# Patient Record
Sex: MALE | Race: WHITE | NOT HISPANIC OR LATINO | Employment: FULL TIME | URBAN - METROPOLITAN AREA
[De-identification: names, ages, dates, MRNs, and addresses within clinical notes are randomized per-mention and may not be internally consistent; named-entity substitution may affect disease eponyms.]

---

## 2017-03-27 ENCOUNTER — OFFICE VISIT (OUTPATIENT)
Dept: URGENT CARE | Facility: URGENT CARE | Age: 57
End: 2017-03-27
Payer: COMMERCIAL

## 2017-03-27 VITALS
WEIGHT: 173.4 LBS | BODY MASS INDEX: 24.55 KG/M2 | DIASTOLIC BLOOD PRESSURE: 80 MMHG | HEART RATE: 75 BPM | OXYGEN SATURATION: 100 % | SYSTOLIC BLOOD PRESSURE: 130 MMHG

## 2017-03-27 DIAGNOSIS — L29.9 ITCHING: Primary | ICD-10-CM

## 2017-03-27 PROCEDURE — 99213 OFFICE O/P EST LOW 20 MIN: CPT | Performed by: FAMILY MEDICINE

## 2017-03-27 RX ORDER — TRIAMCINOLONE ACETONIDE 1 MG/G
CREAM TOPICAL
Qty: 30 G | Refills: 1 | Status: SHIPPED | OUTPATIENT
Start: 2017-03-27 | End: 2017-03-28

## 2017-03-27 NOTE — MR AVS SNAPSHOT
After Visit Summary   3/27/2017    Gael Le    MRN: [id number]           Patient Information     Date Of Birth          1960        Visit Information        Provider Department      3/27/2017 4:30 PM Ryder Roa MD [facility] Urgent [facility]        Today's Diagnoses     Itching    -  1       Follow-ups after your visit        Who to contact     If you have questions or need follow up information about today's clinic visit or your schedule please contact [facility] directly at [phone].  Normal or non-critical lab and imaging results will be communicated to you by Happy Dayshart, letter or phone within 4 business days after the clinic has received the results. If you do not hear from us within 7 days, please contact the clinic through [email or web address] or phone. If you have a critical or abnormal lab result, we will notify you by phone as soon as possible.  Submit refill requests through Anpro21 or call your pharmacy and they will forward the refill request to us. Please allow 3 business days for your refill to be completed.          Additional Information About Your Visit        MyChart Information     Anpro21 gives you secure access to your electronic health record. If you see a primary care provider, you can also send messages to your care team and make appointments. If you have questions, please call your primary care clinic.  If you do not have a primary care provider, please call [phone] and they will assist you.        Care EveryWhere ID     This is your Care EveryWhere ID. This could be used by other organizations to access your [place] medical records  [id number]        Your Vitals Were     Pulse Pulse Oximetry BMI (Body Mass Index)             75 100% 24.55 kg/m2          Blood Pressure from Last 3 Encounters:   03/27/17 130/80   11/22/16 128/74   11/21/16 (!) 148/92    Weight from Last 3 Encounters:   03/27/17 173 lb 6.4 oz (78.7 kg)   11/22/16 175 lb (79.4 kg)    11/14/16 170 lb (77.1 kg)              Today, you had the following     No orders found for display         Today's Medication Changes          These changes are accurate as of: 3/27/17 11:59 PM.  If you have any questions, ask your nurse or doctor.               Start taking these medicines.        Dose/Directions    triamcinolone 0.1 % cream   Commonly known as:  KENALOG   Used for:  Itching   Started by:  Ryder Roa MD        Apply sparingly to affected area three times daily for 14 days.   Quantity:  30 g   Refills:  1            Where to get your medicines      These medications were sent to [facility] [address] [place], [place] [address] [facility] AT [place] [address] [address], [facility] [address]     Phone:  [phone]     triamcinolone 0.1 % cream                Primary Care Provider Office Phone # Fax #    Idania Marroquin [phone]       [address]  [facility] [address]        Thank you!     Thank you for choosing [facility]  for your care. Our goal is always to provide you with excellent care. Hearing back from our patients is one way we can continue to improve our services. Please take a few minutes to complete the written survey that you may receive in the mail after your visit with us. Thank you!             Your Updated Medication List - Protect others around you: Learn how to safely use, store and throw away your medicines at www.disposemymeds.org.          This list is accurate as of: 3/27/17 11:59 PM.  Always use your most recent med list.                   Brand Name Dispense Instructions for use    FISH OIL          fluticasone 50 MCG/ACT spray    FLONASE    3 Bottle    Spray 2 sprays into both nostrils daily       gabapentin 100 MG capsule    NEURONTIN    30 capsule    Take 1 capsule (100 mg) by mouth nightly as needed Restless legs or anxiety       MULTI-VITAMIN DAILY PO      Take  by mouth.        simvastatin 40 MG tablet    ZOCOR    90 tablet    Take 1 tablet  by mouth At Bedtime.       tadalafil 5 MG tablet    CIALIS     Take 5 mg by mouth       triamcinolone 0.1 % cream    KENALOG    30 g    Apply sparingly to affected area three times daily for 14 days.       VIAGRA PO      Take  by mouth.       XANAX 0.5 MG tablet   Generic drug:  ALPRAZolam      Take 0.5 mg by mouth 3 times daily as needed.

## 2017-03-27 NOTE — NURSING NOTE
"Chief Complaint   Patient presents with     Urgent Care     Derm Problem     pt c/o itching in back, possible rash, dry skin x 1 yr OTC: hydrocortisone       Initial /80 (BP Location: Right arm, Patient Position: Chair, Cuff Size: Adult Regular)  Pulse 75  Wt 173 lb 6.4 oz (78.7 kg)  SpO2 100%  BMI 24.55 kg/m2 Estimated body mass index is 24.55 kg/(m^2) as calculated from the following:    Height as of 11/22/16: 5' 10.47\" (1.79 m).    Weight as of this encounter: 173 lb 6.4 oz (78.7 kg).  Medication Reconciliation: complete      Lucy Hurst CMA                                3/27/2017 4:59 PM     "

## 2017-03-28 ENCOUNTER — OFFICE VISIT (OUTPATIENT)
Dept: FAMILY MEDICINE | Facility: CLINIC | Age: 57
End: 2017-03-28
Payer: COMMERCIAL

## 2017-03-28 DIAGNOSIS — D48.5 NEOPLASM OF UNCERTAIN BEHAVIOR OF SKIN: Primary | ICD-10-CM

## 2017-03-28 PROCEDURE — 99213 OFFICE O/P EST LOW 20 MIN: CPT | Mod: 25 | Performed by: FAMILY MEDICINE

## 2017-03-28 PROCEDURE — 88305 TISSUE EXAM BY PATHOLOGIST: CPT | Performed by: FAMILY MEDICINE

## 2017-03-28 PROCEDURE — 11301 SHAVE SKIN LESION 0.6-1.0 CM: CPT | Performed by: FAMILY MEDICINE

## 2017-03-28 NOTE — PROGRESS NOTES
[facility] - PRIMARY CARE SKIN    CC : Lesion(s)  SUBJECTIVE:                                                    Gael Le is a 56 year old male who presents to clinic today because of a mole on the back.    Bothersome lesions noticed by the patient or other skin concerns :  Issue One : A mole on the back is itchy. He has tried applied OTC hydrocortisone, but itchiness persists.  Onset : Not new.  Enlarging : NO.  Bleeding : NO  Itchy or irritating : YES.  Pain or tenderness : NO.  Changing color : NO.  Issue Two : Another mole on the right neck may be new.  Onset : first noticed in the mirror yesterday.  Enlarging : NO.  Bleeding : NO  Itchy or irritating : NO.  Pain or tenderness : NO.  Changing color : NO.    Personal history of skin cancer : NO. Eczema : YES  Family history of skin cancer : YES.    Sun Exposure History  Previous history of significant sun exposure:  Blistering sunburns : YES - once  Tanning beds : YES - when younger.  Sunscreen Use : YES, SPF : 30+.  Sun-protective clothing use : NO  Wide-brimmed hats : YES - when hiking  Sunglasses : YES  Seeks shade : No    Occupation : (indoor).    Refer to electronic medical record (EMR) for past medical history and medications.    INTEGUMENTARY/SKIN: POSITIVE for mole changes  ROS : 14 point review of systems was negative except the symptoms listed above in the HPI.    This document serves as a record of the services and decisions personally performed and made by Alison Boothe MD. It was created on her behalf by Azam Connelly, a trained medical scribe.  The creation of this document is based on the scribe's personal observations and the provider's statements to the medical scribe.  Azam Connelly, March 28, 2017 4:00 PM      OBJECTIVE:                                                    GENERAL: healthy, alert and no distress  SKIN: [place] Skin Type - III.  Neck and Trunk were examined. The dermatoscope was used to help evaluate pigmented  "lesions.  Skin Pertinent Findings:  Back, 5 cm left of midline, at T9 : 9 mm in size, raised, light brown lesion with erythematous base. Dermoscopy - amelanocytic. ? Acntholytic seborrheic keratosis ? Irritated benign nevus ? Other    Right base of neck : 2 mm in size, telangiectasia.    Diagnostic Test Results:  none           ASSESSMENT:                                                      Encounter Diagnosis   Name Primary?     Neoplasm of uncertain behavior of skin Yes         PLAN:                                                    Patient Instructions   FUTURE APPOINTMENTS  Follow up as needed.    WOUND CARE INSTRUCTIONS  1. After 24 hours, change dressing daily.  2. Wash hands before every dressing change.  3. Wash the wound area with a mild soap, then rinse  4. Gently pat dry with a sterile gauze or Q-tip.  5. Apply Vaseline or Aquaphor only over entire wound. Do NOT use Neosporin - as many people react to neomycin.  6. Finally, cover with a bandage or sterile non-stick gauze with micropore paper tape.  7. Repeat once daily until wound has healed.    Do not let the wound dry out.  The wound will heal faster and with better cosmetic results if routinely kept moist with step #5. It is a false belief that a wound heals better when it is exposed to air and allowed to dry out.      Soap, water and shampoo will not hurt this area.    Do not go swimming or take baths, but showering is encouraged.    Limit use of the area where the procedure was done for a few days to allow for optimal healing.    If you experience bleeding:  Repeat steps #2-5 and hold firm pressure on the area for 10 minutes without checking to see if the bleeding has stopped. \"Checking\" pulls off the protective wound clot and restarts the bleeding all over again. Re-apply pressure for 10 minutes if necessary to stop bleeding.  Use additional sterile gauze and tape to maintain pressure once bleeding has stopped.    Signs of Infection:  Infection " can occur in any area where skin has been disrupted.  If you notice persistent redness, swelling, colored drainage, increasing pain, fever or other signs of infection, please call us at: [phone] and ask to have me or my colleague paged. We will call you back to discuss.    Pathology Results:  You will be notified, generally via letter or MyChart, in approximately 10 days. If there is anything we need to discuss or further treatment needed, I will call you to discuss it.      PATIENT INFORMATION : WOUNDS  During the healing process you will notice a number of changes. All wounds develop a small halo of redness surrounding the wound.  This means healing is occurring. Severe itching with extensive redness usually indicates sensitivity to the ointment or bandage tape used to dress the wound.  You should call our office if this develops.      Swelling  and/or discoloration around your surgical site is common, particularly when performed around the eye.    All wounds normally drain.  The larger the wound the more drainage there will be.  After 7-10 days, you will notice the wound beginning to shrink and new skin will begin to grow.  The wound is healed when you can see skin has formed over the entire area.  A healed wound has a healthy, shiny look to the surface and is red to dark pink in color to normalize.  Wounds may take approximately 4-6 weeks to heal.  Larger wounds may take 6-8 weeks. After the wound is healed you may discontinue dressing changes.    You may experience a sensation of tightness as your wound heals. This is normal and will gradually subside.    Your healed wound may be sensitive to temperature changes. This sensitivity improves with time, but if you re having a lot of discomfort, try to avoid temperature extremes.    Patients frequently experience itching after their wound appears to have healed because of the continue healing under the skin.  Plain Vaseline will help relieve the  itching.            PROCEDURES:                                                    Name : Shave Excision  Indication : Excision of tissue for pathology evaluation.  Location(s) : Back, 5 cm left of midline, at T9 : 9 mm in size, raised, light brown lesion with erythematous base. Dermoscopy - amelanocytic. ? Acntholytic seborrheic keratosis ? Irritated benign nevus ? Other.  Completed by : Alison Boothe MD  Photo Taken : no.  Anesthesia : Patient was anesthetized by infiltrating the area surrounding the lesion with 1% lidocaine.   epinephrine [id number] : Yes.  Buffered with bicarbonate : Yes.  Note : Discussed the risk of pain, infection, scarring, hypo- or hyperpigmentation and recurrence or need for re-treatment. The benefits of treatment and alternative treatments were also discussed.    During this procedure, the universal protocol was utilized. The patient's identity was confirmed by no less than two patient identifiers, correct procedure was verified, correct site was verified and marked as applicable and a final pause was completed.    Sterile technique was used throughout the procedure. The skin was cleaned and prepped with surgical cleanser. Once adequate anesthesia was obtained, the lesion was removed with a deep scallop shave procedure. The specimen was sent to pathology.    Direct pressure and monsels's and monopolar cautery was applied for hemostasis. No bleeding was present upon the completion of the procedure. The wound was coated with antibacterial ointment. A dry sterile dressing was applied. Patient tolerated the procedure well and left in satisfactory condition.    Primary provider and referring provider will be informed regarding the tissue report when it returns.      The information in this document, created by the medical scribe for me, accurately reflects the services I personally performed and the decisions made by me. I have reviewed and approved this document for accuracy prior to leaving  the patient care area.  Alison Boothe MD March 28, 2017 4:00 PM  [facility] - PRIMARY CARE SKIN

## 2017-03-28 NOTE — PATIENT INSTRUCTIONS
"FUTURE APPOINTMENTS  Follow up as needed.    WOUND CARE INSTRUCTIONS  1. After 24 hours, change dressing daily.  2. Wash hands before every dressing change.  3. Wash the wound area with a mild soap, then rinse  4. Gently pat dry with a sterile gauze or Q-tip.  5. Apply Vaseline or Aquaphor only over entire wound. Do NOT use Neosporin - as many people react to neomycin.  6. Finally, cover with a bandage or sterile non-stick gauze with micropore paper tape.  7. Repeat once daily until wound has healed.    Do not let the wound dry out.  The wound will heal faster and with better cosmetic results if routinely kept moist with step #5. It is a false belief that a wound heals better when it is exposed to air and allowed to dry out.      Soap, water and shampoo will not hurt this area.    Do not go swimming or take baths, but showering is encouraged.    Limit use of the area where the procedure was done for a few days to allow for optimal healing.    If you experience bleeding:  Repeat steps #2-5 and hold firm pressure on the area for 10 minutes without checking to see if the bleeding has stopped. \"Checking\" pulls off the protective wound clot and restarts the bleeding all over again. Re-apply pressure for 10 minutes if necessary to stop bleeding.  Use additional sterile gauze and tape to maintain pressure once bleeding has stopped.    Signs of Infection:  Infection can occur in any area where skin has been disrupted.  If you notice persistent redness, swelling, colored drainage, increasing pain, fever or other signs of infection, please call us at: [phone] and ask to have me or my colleague paged. We will call you back to discuss.    Pathology Results:  You will be notified, generally via letter or MyChart, in approximately 10 days. If there is anything we need to discuss or further treatment needed, I will call you to discuss it.      PATIENT INFORMATION : WOUNDS  During the healing process you will notice a number " of changes. All wounds develop a small halo of redness surrounding the wound.  This means healing is occurring. Severe itching with extensive redness usually indicates sensitivity to the ointment or bandage tape used to dress the wound.  You should call our office if this develops.      Swelling  and/or discoloration around your surgical site is common, particularly when performed around the eye.    All wounds normally drain.  The larger the wound the more drainage there will be.  After 7-10 days, you will notice the wound beginning to shrink and new skin will begin to grow.  The wound is healed when you can see skin has formed over the entire area.  A healed wound has a healthy, shiny look to the surface and is red to dark pink in color to normalize.  Wounds may take approximately 4-6 weeks to heal.  Larger wounds may take 6-8 weeks. After the wound is healed you may discontinue dressing changes.    You may experience a sensation of tightness as your wound heals. This is normal and will gradually subside.    Your healed wound may be sensitive to temperature changes. This sensitivity improves with time, but if you re having a lot of discomfort, try to avoid temperature extremes.    Patients frequently experience itching after their wound appears to have healed because of the continue healing under the skin.  Plain Vaseline will help relieve the itching.

## 2017-03-28 NOTE — MR AVS SNAPSHOT
"              After Visit Summary   3/28/2017    Gael Le    MRN: [id number]           Patient Information     Date Of Birth          1960        Visit Information        Provider Department      3/28/2017 4:20 PM Magda Boothe MD [facility]        Today's Diagnoses     Neoplasm of uncertain behavior of skin    -  1      Care Instructions    FUTURE APPOINTMENTS  Follow up as needed.    WOUND CARE INSTRUCTIONS  1. After 24 hours, change dressing daily.  2. Wash hands before every dressing change.  3. Wash the wound area with a mild soap, then rinse  4. Gently pat dry with a sterile gauze or Q-tip.  5. Apply Vaseline or Aquaphor only over entire wound. Do NOT use Neosporin - as many people react to neomycin.  6. Finally, cover with a bandage or sterile non-stick gauze with micropore paper tape.  7. Repeat once daily until wound has healed.    Do not let the wound dry out.  The wound will heal faster and with better cosmetic results if routinely kept moist with step #5. It is a false belief that a wound heals better when it is exposed to air and allowed to dry out.      Soap, water and shampoo will not hurt this area.    Do not go swimming or take baths, but showering is encouraged.    Limit use of the area where the procedure was done for a few days to allow for optimal healing.    If you experience bleeding:  Repeat steps #2-5 and hold firm pressure on the area for 10 minutes without checking to see if the bleeding has stopped. \"Checking\" pulls off the protective wound clot and restarts the bleeding all over again. Re-apply pressure for 10 minutes if necessary to stop bleeding.  Use additional sterile gauze and tape to maintain pressure once bleeding has stopped.    Signs of Infection:  Infection can occur in any area where skin has been disrupted.  If you notice persistent redness, swelling, colored drainage, increasing pain, fever or other signs of infection, please " call us at: [phone] and ask to have me or my colleague paged. We will call you back to discuss.    Pathology Results:  You will be notified, generally via letter or MyChart, in approximately 10 days. If there is anything we need to discuss or further treatment needed, I will call you to discuss it.      PATIENT INFORMATION : WOUNDS  During the healing process you will notice a number of changes. All wounds develop a small halo of redness surrounding the wound.  This means healing is occurring. Severe itching with extensive redness usually indicates sensitivity to the ointment or bandage tape used to dress the wound.  You should call our office if this develops.      Swelling  and/or discoloration around your surgical site is common, particularly when performed around the eye.    All wounds normally drain.  The larger the wound the more drainage there will be.  After 7-10 days, you will notice the wound beginning to shrink and new skin will begin to grow.  The wound is healed when you can see skin has formed over the entire area.  A healed wound has a healthy, shiny look to the surface and is red to dark pink in color to normalize.  Wounds may take approximately 4-6 weeks to heal.  Larger wounds may take 6-8 weeks. After the wound is healed you may discontinue dressing changes.    You may experience a sensation of tightness as your wound heals. This is normal and will gradually subside.    Your healed wound may be sensitive to temperature changes. This sensitivity improves with time, but if you re having a lot of discomfort, try to avoid temperature extremes.    Patients frequently experience itching after their wound appears to have healed because of the continue healing under the skin.  Plain Vaseline will help relieve the itching.            Follow-ups after your visit        Your next 10 appointments already scheduled     Mar 28, 2017  4:20 PM CDT   Office Visit with Magda Boothe MD   [place]  [facility] ([facility] )    [address] [place] [address]  [address]  [facility] [address] [phone]           Bring a current list of meds and any records pertaining to this visit.  For Physicals, please bring immunization records and any forms needing to be filled out.  Please arrive 10 minutes early to complete paperwork.            Apr 03, 2017 12:40 PM CDT   MyChart Short with Idania Mar[facility]quin MD   [facility]    [address] [facility] [address] [phone]              Who to contact     If you have questions or need follow up information about today's clinic visit or your schedule please contact [facility] directly at [phone].  Normal or non-critical lab and imaging results will be communicated to you by MyChart, letter or phone within 4 business days after the clinic has received the results. If you do not hear from us within 7 days, please contact the clinic through MyChart or phone. If you have a critical or abnormal lab result, we will notify you by phone as soon as possible.  Submit refill requests through [employer] or call your pharmacy and they will forward the refill request to us. Please allow 3 business days for your refill to be completed.          Additional Information About Your Visit        MyChart Information     [employer] gives you secure access to your electronic health record. If you see a primary care provider, you can also send messages to your care team and make appointments. If you have questions, please call your primary care clinic.  If you do not have a primary care provider, please call [phone] and they will assist you.        Care EveryWhere ID     This is your Care EveryWhere ID. This could be used by other organizations to access your [place] medical records  [id number]         Blood Pressure from Last 3 Encounters:   03/27/17 130/80    11/22/16 128/74   11/21/16 (!) 148/92    Weight from Last 3 Encounters:   03/27/17 173 lb 6.4 oz (78.7 kg)   11/22/16 175 lb (79.4 kg)   11/14/16 170 lb (77.1 kg)              Today, you had the following     No orders found for display       Primary Care Provider Office Phone # Fax #    Idania Marroquin [phone]       [facility] [address] [place] [address] N  [facility] [address]        Thank you!     Thank you for choosing [facility]  for your care. Our goal is always to provide you with excellent care. Hearing back from our patients is one way we can continue to improve our services. Please take a few minutes to complete the written survey that you may receive in the mail after your visit with us. Thank you!             Your Updated Medication List - Protect others around you: Learn how to safely use, store and throw away your medicines at www.disposemymeds.org.          This list is accurate as of: 3/28/17  4:06 PM.  Always use your most recent med list.                   Brand Name Dispense Instructions for use    FISH OIL          fluticasone 50 MCG/ACT spray    FLONASE    3 Bottle    Spray 2 sprays into both nostrils daily       gabapentin 100 MG capsule    NEURONTIN    30 capsule    Take 1 capsule (100 mg) by mouth nightly as needed Restless legs or anxiety       MULTI-VITAMIN DAILY PO      Take  by mouth.       simvastatin 40 MG tablet    ZOCOR    90 tablet    Take 1 tablet  by mouth At Bedtime.       tadalafil 5 MG tablet    CIALIS     Take 5 mg by mouth       VIAGRA PO      Take  by mouth.       XANAX 0.5 MG tablet   Generic drug:  ALPRAZolam      Take 0.5 mg by mouth 3 times daily as needed.

## 2017-03-30 LAB — COPATH REPORT: NORMAL

## 2017-04-22 ENCOUNTER — MYC MEDICAL ADVICE (OUTPATIENT)
Dept: FAMILY MEDICINE | Facility: CLINIC | Age: 57
End: 2017-04-22

## 2017-05-09 DIAGNOSIS — E78.2 MIXED HYPERLIPIDEMIA: ICD-10-CM

## 2017-05-09 LAB
CHOLEST SERPL-MCNC: 284 MG/DL
HDLC SERPL-MCNC: 50 MG/DL
LDLC SERPL CALC-MCNC: 196 MG/DL
NONHDLC SERPL-MCNC: 234 MG/DL
TRIGL SERPL-MCNC: 188 MG/DL

## 2017-05-09 PROCEDURE — 80061 LIPID PANEL: CPT | Performed by: INTERNAL MEDICINE

## 2017-05-09 PROCEDURE — 36415 COLL VENOUS BLD VENIPUNCTURE: CPT | Performed by: INTERNAL MEDICINE

## 2017-05-14 ENCOUNTER — MYC MEDICAL ADVICE (OUTPATIENT)
Dept: FAMILY MEDICINE | Facility: CLINIC | Age: 57
End: 2017-05-14

## 2017-05-14 DIAGNOSIS — E78.2 MIXED HYPERLIPIDEMIA: Primary | ICD-10-CM

## 2017-05-15 RX ORDER — ATORVASTATIN CALCIUM 20 MG/1
20 TABLET, FILM COATED ORAL DAILY
Qty: 90 TABLET | Refills: 1 | Status: SHIPPED | OUTPATIENT
Start: 2017-05-15 | End: 2017-09-07

## 2017-06-06 ENCOUNTER — ALLIED HEALTH/NURSE VISIT (OUTPATIENT)
Dept: NURSING | Facility: CLINIC | Age: 57
End: 2017-06-06
Payer: COMMERCIAL

## 2017-06-06 DIAGNOSIS — Z23 NEED FOR TDAP VACCINATION: Primary | ICD-10-CM

## 2017-06-06 PROCEDURE — 90715 TDAP VACCINE 7 YRS/> IM: CPT

## 2017-06-06 PROCEDURE — 99207 ZZC NO CHARGE NURSE ONLY: CPT

## 2017-06-06 PROCEDURE — 90471 IMMUNIZATION ADMIN: CPT

## 2017-06-06 NOTE — NURSING NOTE
Screening Questionnaire for Adult Immunization    Are you sick today?   No   Do you have allergies to medications, food, a vaccine component or latex?   No   Have you ever had a serious reaction after receiving a vaccination?   No   Do you have a long-term health problem with heart disease, lung disease, asthma, kidney disease, metabolic disease (e.g. diabetes), anemia, or other blood disorder?   No   Do you have cancer, leukemia, HIV/AIDS, or any other immune system problem?   No   In the past 3 months, have you taken medications that affect  your immune system, such as prednisone, other steroids, or anticancer drugs; drugs for the treatment of rheumatoid arthritis, Crohn s disease, or psoriasis; or have you had radiation treatments?   No   Have you had a seizure, or a brain or other nervous system problem?   No   During the past year, have you received a transfusion of blood or blood     products, or been given immune (gamma) globulin or antiviral drug?   No   For women: Are you pregnant or is there a chance you could become        pregnant during the next month?   No   Have you received any vaccinations in the past 4 weeks?   No     Immunization questionnaire answers were all negative.      MNVFC doesn't apply on this patient    Per orders of Dr. Cara Emerson, injection of TDAP  given by Demi Sullivan. Patient instructed to remain in clinic for 20 minutes afterwards, and to report any adverse reaction to me immediately.       Screening performed by Demi Sullivan on 6/6/2017 at 4:55 PM.

## 2017-06-06 NOTE — MR AVS SNAPSHOT
After Visit Summary   6/6/2017    Gael Le    MRN: [id number]           Patient Information     Date Of Birth          1960        Visit Information        Provider Department      6/6/2017 4:40 PM [facility]        Today's Diagnoses     Need for Tdap vaccination    -  1       Follow-ups after your visit        Who to contact     If you have questions or need follow up information about today's clinic visit or your schedule please contact [facility] directly at [phone].  Normal or non-critical lab and imaging results will be communicated to you by [employer], letter or phone within 4 business days after the clinic has received the results. If you do not hear from us within 7 days, please contact the clinic through [employer] or phone. If you have a critical or abnormal lab result, we will notify you by phone as soon as possible.  Submit refill requests through [employer] or call your pharmacy and they will forward the refill request to us. Please allow 3 business days for your refill to be completed.          Additional Information About Your Visit        MyChart Information     [employer] gives you secure access to your electronic health record. If you see a primary care provider, you can also send messages to your care team and make appointments. If you have questions, please call your primary care clinic.  If you do not have a primary care provider, please call [phone] and they will assist you.        Care EveryWhere ID     This is your Care EveryWhere ID. This could be used by other organizations to access your [place] medical records  [id number]         Blood Pressure from Last 3 Encounters:   03/27/17 130/80   11/22/16 128/74   11/21/16 (!) 148/92    Weight from Last 3 Encounters:   03/27/17 173 lb 6.4 oz (78.7 kg)   11/22/16 175 lb (79.4 kg)   11/14/16 170 lb (77.1 kg)              We Performed the Following     TDAP VACCINE (ADACEL)      VACCINE ADMINISTRATION, INITIAL        Primary Care Provider Office Phone # Fax #    Idania Marroquin [phone]       [place] [address] [place] [address] [address]  [facility] [address]        Thank you!     Thank you for choosing [facility]  for your care. Our goal is always to provide you with excellent care. Hearing back from our patients is one way we can continue to improve our services. Please take a few minutes to complete the written survey that you may receive in the mail after your visit with us. Thank you!             Your Updated Medication List - Protect others around you: Learn how to safely use, store and throw away your medicines at www.disposemymeds.org.          This list is accurate as of: 6/6/17  5:10 PM.  Always use your most recent med list.                   Brand Name Dispense Instructions for use    atorvastatin 20 MG tablet    LIPITOR    90 tablet    Take 1 tablet (20 mg) by mouth daily       FISH OIL          fluticasone 50 MCG/ACT spray    FLONASE    3 Bottle    Spray 2 sprays into both nostrils daily       gabapentin 100 MG capsule    NEURONTIN    30 capsule    Take 1 capsule (100 mg) by mouth nightly as needed Restless legs or anxiety       MULTI-VITAMIN DAILY PO      Take  by mouth.       simvastatin 40 MG tablet    ZOCOR    90 tablet    Take 1 tablet  by mouth At Bedtime.       tadalafil 5 MG tablet    CIALIS     Take 5 mg by mouth       VIAGRA PO      Take  by mouth.       XANAX 0.5 MG tablet   Generic drug:  ALPRAZolam      Take 0.5 mg by mouth 3 times daily as needed.

## 2017-07-14 DIAGNOSIS — N52.9 ERECTILE DYSFUNCTION, UNSPECIFIED ERECTILE DYSFUNCTION TYPE: Primary | ICD-10-CM

## 2017-07-14 NOTE — TELEPHONE ENCOUNTER
Sildenafil Citrate (VIAGRA PO)      Last Written Prescription Date:  11/22/16  Last Fill Quantity: n/a,   # refills: n/a  Last Office Visit with [facility], [facility] or [facility] prescribing provider: 3/28/17  Future Office visit:    Next 5 appointments (look out 90 days)     Aug 29, 2017  8:30 AM CDT   Lab visit with [facility]   [facility]    [address] [place] [address]   [phone]            Sep 07, 2017  7:00 AM CDT   MyChart Physical Adult with Idania [facility]quin [facility]   [facility]    [address] [place] [address] [phone]                   Routing refill request to provider for review/approval because:  Medication is reported/historical

## 2017-07-17 RX ORDER — SILDENAFIL CITRATE 20 MG/1
TABLET ORAL
Qty: 60 TABLET | Refills: 0 | Status: SHIPPED | OUTPATIENT
Start: 2017-07-17 | End: 2017-10-24 | Stop reason: ALTCHOICE

## 2017-08-01 DIAGNOSIS — F41.1 ANXIETY STATE: Primary | ICD-10-CM

## 2017-08-03 RX ORDER — ALPRAZOLAM 0.5 MG
TABLET ORAL
Qty: 30 TABLET | Refills: 0 | Status: SHIPPED | OUTPATIENT
Start: 2017-08-03 | End: 2018-05-17

## 2017-08-03 NOTE — TELEPHONE ENCOUNTER
Routing refill request to provider for review/approval because:  Drug not on the [facility] refill protocol   Medication is reported/historical  Leigh Ann Hector RN

## 2017-08-03 NOTE — TELEPHONE ENCOUNTER
Fax script   PSK    Review -[place] website.  Last fill #30 in October 2016.    Fill now for as needed use. PSK

## 2017-09-01 DIAGNOSIS — Z12.5 SCREENING FOR PROSTATE CANCER: ICD-10-CM

## 2017-09-01 DIAGNOSIS — E78.2 MIXED HYPERLIPIDEMIA: ICD-10-CM

## 2017-09-01 LAB
ALT SERPL W P-5'-P-CCNC: 45 U/L (ref 0–70)
CHOLEST SERPL-MCNC: 178 MG/DL
HDLC SERPL-MCNC: 52 MG/DL
LDLC SERPL CALC-MCNC: 89 MG/DL
NONHDLC SERPL-MCNC: 126 MG/DL
PSA SERPL-ACNC: 0.85 UG/L (ref 0–4)
TRIGL SERPL-MCNC: 186 MG/DL

## 2017-09-01 PROCEDURE — G0103 PSA SCREENING: HCPCS | Performed by: INTERNAL MEDICINE

## 2017-09-01 PROCEDURE — 36415 COLL VENOUS BLD VENIPUNCTURE: CPT | Performed by: INTERNAL MEDICINE

## 2017-09-01 PROCEDURE — 80061 LIPID PANEL: CPT | Performed by: INTERNAL MEDICINE

## 2017-09-01 PROCEDURE — 84460 ALANINE AMINO (ALT) (SGPT): CPT | Performed by: INTERNAL MEDICINE

## 2017-09-04 NOTE — PROGRESS NOTES
Your cholesterol is improved from previous and is in the normal range on your medication.  The triglyceride level (fats) is borderline elevated and can generally be improved with diet and exercise.  Your liver testing is normal.   Prostate cancer screening is negative/normal.  Please call or MyChart message me if you have any questions.   PSK

## 2017-09-07 ENCOUNTER — OFFICE VISIT (OUTPATIENT)
Dept: FAMILY MEDICINE | Facility: CLINIC | Age: 57
End: 2017-09-07
Payer: COMMERCIAL

## 2017-09-07 VITALS
SYSTOLIC BLOOD PRESSURE: 118 MMHG | TEMPERATURE: 98.6 F | DIASTOLIC BLOOD PRESSURE: 84 MMHG | BODY MASS INDEX: 24.77 KG/M2 | HEIGHT: 70 IN | WEIGHT: 173 LBS | HEART RATE: 95 BPM | OXYGEN SATURATION: 97 %

## 2017-09-07 DIAGNOSIS — Z23 NEED FOR PROPHYLACTIC VACCINATION AND INOCULATION AGAINST INFLUENZA: ICD-10-CM

## 2017-09-07 DIAGNOSIS — Z00.00 ROUTINE GENERAL MEDICAL EXAMINATION AT A HEALTH CARE FACILITY: Primary | ICD-10-CM

## 2017-09-07 DIAGNOSIS — M79.621 PAIN OF RIGHT UPPER ARM: ICD-10-CM

## 2017-09-07 DIAGNOSIS — F41.1 ANXIETY STATE: ICD-10-CM

## 2017-09-07 DIAGNOSIS — E78.2 MIXED HYPERLIPIDEMIA: ICD-10-CM

## 2017-09-07 PROCEDURE — 90471 IMMUNIZATION ADMIN: CPT | Performed by: FAMILY MEDICINE

## 2017-09-07 PROCEDURE — 90686 IIV4 VACC NO PRSV 0.5 ML IM: CPT | Performed by: FAMILY MEDICINE

## 2017-09-07 PROCEDURE — 99396 PREV VISIT EST AGE 40-64: CPT | Mod: 25 | Performed by: FAMILY MEDICINE

## 2017-09-07 RX ORDER — ATORVASTATIN CALCIUM 20 MG/1
20 TABLET, FILM COATED ORAL DAILY
Qty: 90 TABLET | Refills: 3 | Status: SHIPPED | OUTPATIENT
Start: 2017-09-07 | End: 2018-05-17

## 2017-09-07 ASSESSMENT — PAIN SCALES - GENERAL: PAINLEVEL: NO PAIN (0)

## 2017-09-07 NOTE — PATIENT INSTRUCTIONS
Refill Lipitor today.  Add aspirin daily.      Flu vaccine today.    For the skin rash in winter continue to use the cetaphil and the hydrocortisone.  Follow up if not resolved with this treatment.    For the arm pain - anticipate continued gradual improvement with your planned exercise program.    Preventive Health Recommendations  Male Ages 50   64    Yearly exam:             See your health care provider every year in order to  o   Review health changes.   o   Discuss preventive care.    o   Review your medicines if your doctor has prescribed any.     Have a cholesterol test every 5 years, or more frequently if you are at risk for high cholesterol/heart disease.     Have a diabetes test (fasting glucose) every three years. If you are at risk for diabetes, you should have this test more often.     Have a colonoscopy at age 50, or have a yearly FIT test (stool test). These exams will check for colon cancer.      Talk with your health care provider about whether or not a prostate cancer screening test (PSA) is right for you.    You should be tested each year for STDs (sexually transmitted diseases), if you re at risk.     Shots: Get a flu shot each year. Get a tetanus shot every 10 years.     Nutrition:    Eat at least 5 servings of fruits and vegetables daily.     Eat whole-grain bread, whole-wheat pasta and brown rice instead of white grains and rice.     Talk to your provider about Calcium and Vitamin D.     Lifestyle    Exercise for at least 150 minutes a week (30 minutes a day, 5 days a week). This will help you control your weight and prevent disease.     Limit alcohol to one drink per day.     No smoking.     Wear sunscreen to prevent skin cancer.     See your dentist every six months for an exam and cleaning.     See your eye doctor every 1 to 2 years.

## 2017-09-07 NOTE — MR AVS SNAPSHOT
After Visit Summary   9/7/2017    Gael Le    MRN: [id number]           Patient Information     Date Of Birth          1960        Visit Information        Provider Department      9/7/2017 7:00 AM Idania Marroquin MD [facility]        Today's Diagnoses     Routine general medical examination at a health care facility    -  1    Need for prophylactic vaccination and inoculation against influenza        Mixed hyperlipidemia        Anxiety state          Care Instructions    Refill Lipitor today.  Add aspirin daily.      Flu vaccine today.    For the skin rash in winter continue to use the cetaphil and the hydrocortisone.  Follow up if not resolved with this treatment.    For the arm pain - anticipate continued gradual improvement with your planned exercise program.    Preventive Health Recommendations  Male Ages 50 - 64    Yearly exam:             See your health care provider every year in order to  o   Review health changes.   o   Discuss preventive care.    o   Review your medicines if your doctor has prescribed any.     Have a cholesterol test every 5 years, or more frequently if you are at risk for high cholesterol/heart disease.     Have a diabetes test (fasting glucose) every three years. If you are at risk for diabetes, you should have this test more often.     Have a colonoscopy at age 50, or have a yearly FIT test (stool test). These exams will check for colon cancer.      Talk with your health care provider about whether or not a prostate cancer screening test (PSA) is right for you.    You should be tested each year for STDs (sexually transmitted diseases), if you re at risk.     Shots: Get a flu shot each year. Get a tetanus shot every 10 years.     Nutrition:    Eat at least 5 servings of fruits and vegetables daily.     Eat whole-grain bread, whole-wheat pasta and brown rice instead of white grains and rice.     Talk to your provider about Calcium and Vitamin D.  "    Lifestyle    Exercise for at least 150 minutes a week (30 minutes a day, 5 days a week). This will help you control your weight and prevent disease.     Limit alcohol to one drink per day.     No smoking.     Wear sunscreen to prevent skin cancer.     See your dentist every six months for an exam and cleaning.     See your eye doctor every 1 to 2 years.            Follow-ups after your visit        Who to contact     If you have questions or need follow up information about today's clinic visit or your schedule please contact [facility] directly at [phone].  Normal or non-critical lab and imaging results will be communicated to you by [employer], letter or phone within 4 business days after the clinic has received the results. If you do not hear from us within 7 days, please contact the clinic through [employer] or phone. If you have a critical or abnormal lab result, we will notify you by phone as soon as possible.  Submit refill requests through [employer] or call your pharmacy and they will forward the refill request to us. Please allow 3 business days for your refill to be completed.          Additional Information About Your Visit        [employer] Information     [employer] gives you secure access to your electronic health record. If you see a primary care provider, you can also send messages to your care team and make appointments. If you have questions, please call your primary care clinic.  If you do not have a primary care provider, please call [phone] and they will assist you.        Care EveryWhere ID     This is your Care EveryWhere ID. This could be used by other organizations to access your [place] medical records  [id number]        Your Vitals Were     Pulse Temperature Height Pulse Oximetry BMI (Body Mass Index)       95 98.6  F (37  C) (Oral) 1.765 m (5' 9.5\") 97% 25.18 kg/m2        Blood Pressure from Last 3 Encounters:   09/07/17 118/84   03/27/17 130/80   11/22/16 128/74    " Weight from Last 3 Encounters:   09/07/17 78.5 kg (173 lb)   03/27/17 78.7 kg (173 lb 6.4 oz)   11/22/16 79.4 kg (175 lb)              We Performed the Following     FLU VAC, SPLIT VIRUS IM > 3 YO (QUADRIVALENT) [53229]     Vaccine Administration, Initial [15182]          Today's Medication Changes          These changes are accurate as of: 9/7/17  7:40 AM.  If you have any questions, ask your nurse or doctor.               Stop taking these medicines if you haven't already. Please contact your care team if you have questions.     fluticasone 50 MCG/ACT spray   Commonly known as:  FLONASE   Stopped by:  Idania Marroquin MD           simvastatin 40 MG tablet   Commonly known as:  ZOCOR   Stopped by:  Idania Marroquin MD           tadalafil 5 MG tablet   Commonly known as:  CIALIS   Stopped by:  Idania Marroquin MD                Where to get your medicines      These medications were sent to [facility] [address] [place] [address] N AT [place] [address] [address] [facility] [address]     Phone:  [phone]     atorvastatin 20 MG tablet                Primary Care Provider Office Phone # Fax #    Idania Marroquin [phone] [address] [facility] [address]        Equal Access to Services     [facility]: Hadii aad ku hadasho Soomaali, waaxda luqadaha, qaybta kaalmada adeegyada, saeed mora haybrittney cornejo . So [facility] [phone].    ATENCIÓN: Si habla español, tiene a fritz disposición servicios gratuitos de asistencia lingüística. Llame al [phone].    We comply with applicable federal civil rights laws and [place] laws. We do not discriminate on the basis of race, color, national origin, age, disability sex, sexual orientation or gender identity.            Thank you!     Thank you for choosing [facility]  for your care. Our goal is always to provide you with excellent care. Hearing back from our  patients is one way we can continue to improve our services. Please take a few minutes to complete the written survey that you may receive in the mail after your visit with us. Thank you!             Your Updated Medication List - Protect others around you: Learn how to safely use, store and throw away your medicines at www.disposemymeds.org.          This list is accurate as of: 9/7/17  7:40 AM.  Always use your most recent med list.                   Brand Name Dispense Instructions for use Diagnosis    ALPRAZolam 0.5 MG tablet    XANAX    30 tablet    TAKE 1 TABLET BY MOUTH THREE TIMES DAILY AS NEEDED FOR ANXIETY    Anxiety state       atorvastatin 20 MG tablet    LIPITOR    90 tablet    Take 1 tablet (20 mg) by mouth daily    Mixed hyperlipidemia       FISH OIL           gabapentin 100 MG capsule    NEURONTIN    30 capsule    Take 1 capsule (100 mg) by mouth nightly as needed Restless legs or anxiety    Restless legs syndrome (RLS), Anxiety as acute reaction to exceptional stress       MULTI-VITAMIN DAILY PO      Take  by mouth.        sildenafil 20 MG tablet    REVATIO/VIAGRA    60 tablet    TAKE 1-5 TABLETS BY MOUTH AT LEAST 30 MINS BEFORE SEXUAL ACTIVITY    Erectile dysfunction, unspecified erectile dysfunction type       VIAGRA PO      Take  by mouth.

## 2017-09-07 NOTE — NURSING NOTE
"Chief Complaint   Patient presents with     Physical       Initial /84  Pulse 95  Temp 98.6  F (37  C) (Oral)  Ht 1.765 m (5' 9.5\")  Wt 78.5 kg (173 lb)  SpO2 97%  BMI 25.18 kg/m2 Estimated body mass index is 25.18 kg/(m^2) as calculated from the following:    Height as of this encounter: 1.765 m (5' 9.5\").    Weight as of this encounter: 78.5 kg (173 lb).  Medication Reconciliation: complete   João SPENCER        "

## 2017-09-07 NOTE — PROGRESS NOTES
Injectable Influenza Immunization Documentation    1.  Is the person to be vaccinated sick today?  No    2. Does the person to be vaccinated have an allergy to eggs or to a component of the vaccine?  No    3. Has the person to be vaccinated today ever had a serious reaction to influenza vaccine in the past?  No    4. Has the person to be vaccinated ever had Guillain-[place] syndrome?  No     Form completed by João SPENCER

## 2017-09-07 NOTE — PROGRESS NOTES
SUBJECTIVE:   CC: Gael Le is an 57 year old male who presents for preventative health visit.     Healthy Habits:    Do you get at least three servings of calcium containing foods daily (dairy, green leafy vegetables, etc.)? yes    Amount of exercise or daily activities, outside of work: 2-3 day(s) per week  -  running    Problems taking medications regularly No    Medication side effects: No    Have you had an eye exam in the past two years? yes    Do you see a dentist twice per year? yes    Do you have sleep apnea, excessive snoring or daytime drowsiness?yes        Hyperlipidemia Follow-Up      Rate your low fat/cholesterol diet?: good    Taking statin?  Yes, no muscle aches from statin    Other lipid medications/supplements?:  Fish oil/Omega 3,  without side effects    Anxiety Follow-Up    Status since last visit: No change    Other associated symptoms:None    Complicating factors:   Significant life event: No   Current substance abuse: None  Depression symptoms: No  Uses periodic xanax - last refill on 8/3/17.     GAD7            Migraine Follow-Up    Headaches symptoms:  Improved     Frequency: rare     Duration of headaches: hours    Able to do normal daily activities/work with migraines: Yes    Rescue/Relief medication:ibuprofen (Advil, Motrin)              Effectiveness: moderate relief    Preventative medication: None    Neurologic complications: No new stroke-like symptoms, loss of vision or speech, numbness or weakness    In the past 4 weeks, how often have you gone to Urgent Care or the emergency room because of your headaches?  0      Right arm pain since 6-9 months ago after sanding something.  Upper arm.  Mild tenderness with deep palpation.  No limitation of activity.        Today's PHQ-2 Score: PHQ-2 ( 1999 Pfizer) 9/6/2017 11/22/2016   Q1: Little interest or pleasure in doing things 0 -   Q2: Feeling down, depressed or hopeless 0 -   PHQ-2 Score 0 -   Q1: Little interest or pleasure in doing  "things Not at all Not at all   Q2: Feeling down, depressed or hopeless Not at all Not at all   PHQ-2 Score 0 0         Abuse: Current or Past(Physical, Sexual or Emotional)- No  Do you feel safe in your environment - Yes  Social History   Substance Use Topics     Smoking status: Never Smoker     Smokeless tobacco: Never Used     Alcohol use Yes     The patient does not drink >3 drinks per day nor >7 drinks per week.    Last PSA:   PSA   Date Value Ref Range Status   09/01/2017 0.85 0 - 4 ug/L Final     Comment:     Assay Method:  Chemiluminescence using Siemens Vista analyzer       Reviewed orders with patient. Reviewed health maintenance and updated orders accordingly - Yes  BP Readings from Last 3 Encounters:   09/07/17 118/84   03/27/17 130/80   11/22/16 128/74    Wt Readings from Last 3 Encounters:   09/07/17 78.5 kg (173 lb)   03/27/17 78.7 kg (173 lb 6.4 oz)   11/22/16 79.4 kg (175 lb)                      Reviewed and updated as needed this visit by clinical staffTobacco  Allergies  Meds  Med Hx  Surg Hx  Fam Hx  Soc Hx        Reviewed and updated as needed this visit by Provider  Tobacco  Allergies  Meds  Med Hx  Surg Hx  Fam Hx  Soc Hx       Past Medical History:   Diagnosis Date     Hypertension       Past Surgical History:   Procedure Laterality Date     COLONOSCOPY WITH CO2 INSUFFLATION N/A 11/21/2016    Procedure: COLONOSCOPY WITH CO2 INSUFFLATION;  Surgeon: Adeel Graf MD;  Location: [facility] OR     FINGER SURGERY       HERNIA REPAIR         ROS:  10 point ROS of systems including Constitutional, Eyes, Respiratory, Cardiovascular, Gastroenterology, Genitourinary, Integumentary, Muscularskeletal, Psychiatric were all negative except for pertinent positives noted in my HPI.  Periodic rash in posterior thigh and calf in the winter months - uses HC cream and other lotions.        OBJECTIVE:   /84  Pulse 95  Temp 98.6  F (37  C) (Oral)  Ht 1.765 m (5' 9.5\")  Wt 78.5 kg (173 lb)  " SpO2 97%  BMI 25.18 kg/m2  EXAM:  GENERAL: alert, no distress and over weight  EYES: Eyes grossly normal to inspection, PERRL and conjunctivae and sclerae normal  HENT: ear canals and TM's normal, nose and mouth without ulcers or lesions  NECK: no adenopathy, no asymmetry, masses, or scars and thyroid normal to palpation  RESP: lungs clear to auscultation - no rales, rhonchi or wheezes  CV: regular rate and rhythm, normal S1 S2, no S3 or S4, no murmur, click or rub, no peripheral edema and peripheral pulses strong  ABDOMEN: soft, nontender, no hepatosplenomegaly, no masses and bowel sounds normal  RECTAL: normal sphincter tone, no rectal masses, prostate normal size, smooth, nontender without nodules or masses  MS: RUE exam shows normal strength and muscle mass, no deformities, no erythema, induration, or nodules, no evidence of joint effusion, ROM of all joints is normal, no evidence of joint instability and mild tenderness to palpation over the biceps muscle on right.  Normal function.    SKIN: no suspicious lesions or rashes - back of calf and thigh with normal skin  NEURO: Normal strength and tone, mentation intact and speech normal  PSYCH: mentation appears normal, affect normal/bright    ASSESSMENT/PLAN:   1. Routine general medical examination at a health care facility  Reviewed labs previously completed.    Entered by Iadnia Marroquin MD at 9/4/2017  7:59 AM   Read by Gael Le at 9/4/2017  9:38 AM   Your cholesterol is improved from previous and is in the normal range on your medication.  The triglyceride level (fats) is borderline elevated and can generally be improved with diet and exercise.   Your liver testing is normal.   Prostate cancer screening is negative/normal.   Please call or WeMonitorhart message me if you have any questions.    PSK       2. Need for prophylactic vaccination and inoculation against influenza  - FLU VAC, SPLIT VIRUS IM > 3 YO (QUADRIVALENT) [21271]  - Vaccine Administration,  "Initial [97592]    3. Mixed hyperlipidemia  Controlled.  - atorvastatin (LIPITOR) 20 MG tablet; Take 1 tablet (20 mg) by mouth daily  Dispense: 90 tablet; Refill: 3    4. Anxiety state  Prn xanax.  Monitor use.    5.  Right upper arm pain.  Planning arm strengthening program.  Monitor symptoms.  Follow up if persistent or worsening.        COUNSELING:  Reviewed preventive health counseling, as reflected in patient instructions       Regular exercise       Healthy diet/nutrition       Vision screening       Immunizations    Vaccinated for: Influenza           Aspirin ProphylaxsisOsteoporosis Prevention/Bone Health         Prostate cancer screening                      reports that he has never smoked. He has never used smokeless tobacco.      Estimated body mass index is 25.18 kg/(m^2) as calculated from the following:    Height as of this encounter: 1.765 m (5' 9.5\").    Weight as of this encounter: 78.5 kg (173 lb).   Weight management plan: Discussed healthy diet and exercise guidelines and patient will follow up in 12 months in clinic to re-evaluate.    Counseling Resources:  ATP IV Guidelines  Pooled Cohorts Equation Calculator  FRAX Risk Assessment  ICSI Preventive Guidelines  Dietary Guidelines for Americans, 2010  [employer] MyPlate  ASA Prophylaxis  Lung CA Screening    Idania Marroquin MD  [facility]        Patient Instructions   Refill Lipitor today.  Add aspirin daily.      Flu vaccine today.    For the skin rash in winter continue to use the cetaphil and the hydrocortisone.  Follow up if not resolved with this treatment.    For the arm pain - anticipate continued gradual improvement with your planned exercise program.          Answers for HPI/ROS submitted by the patient on 9/6/2017   Annual Exam:  Getting at least 3 servings of Calcium per day:: Yes  Bi-annual eye exam:: Yes  Dental care twice a year:: Yes  Sleep apnea or symptoms of sleep apnea:: Daytime drowsiness, Excessive " snoring  Diet:: Regular (no restrictions)  Frequency of exercise:: 2-3 days/week  Taking medications regularly:: Yes  Medication side effects:: None  Additional concerns today:: YES  PHQ-2 Score: 0  Duration of exercise:: 30-45 minutes

## 2017-10-24 ENCOUNTER — MYC MEDICAL ADVICE (OUTPATIENT)
Dept: FAMILY MEDICINE | Facility: CLINIC | Age: 57
End: 2017-10-24

## 2017-10-24 DIAGNOSIS — N52.9 ERECTILE DYSFUNCTION, UNSPECIFIED ERECTILE DYSFUNCTION TYPE: ICD-10-CM

## 2017-10-24 RX ORDER — TADALAFIL 5 MG/1
5 TABLET ORAL DAILY
Qty: 30 TABLET | Refills: 3 | Status: SHIPPED | OUTPATIENT
Start: 2017-10-24 | End: 2017-12-04

## 2017-11-30 DIAGNOSIS — E78.2 MIXED HYPERLIPIDEMIA: ICD-10-CM

## 2017-11-30 RX ORDER — SIMVASTATIN 40 MG
TABLET ORAL
Qty: 90 TABLET | Refills: 0 | OUTPATIENT
Start: 2017-11-30

## 2017-11-30 NOTE — TELEPHONE ENCOUNTER
simvastatin (ZOCOR) 40 MG tablet (Discontinued)     Last Written Prescription Date: 11/22/16  Last Fill Quantity: 90, # refills: 3  Last Office Visit with G, P or [facility] prescribing provider: 9/7/17       Lab Results   Component Value Date    CHOL 178 09/01/2017     Lab Results   Component Value Date    HDL 52 09/01/2017     Lab Results   Component Value Date    LDL 89 09/01/2017     Lab Results   Component Value Date    TRIG 186 09/01/2017     No results found for: STACEY

## 2017-12-02 ENCOUNTER — MYC MEDICAL ADVICE (OUTPATIENT)
Dept: FAMILY MEDICINE | Facility: CLINIC | Age: 57
End: 2017-12-02

## 2017-12-02 DIAGNOSIS — N52.9 ERECTILE DYSFUNCTION, UNSPECIFIED ERECTILE DYSFUNCTION TYPE: Primary | ICD-10-CM

## 2017-12-04 RX ORDER — TADALAFIL 5 MG/1
TABLET ORAL
Qty: 30 TABLET | Refills: 3 | COMMUNITY
Start: 2017-12-04 | End: 2018-10-29

## 2018-04-02 ENCOUNTER — MYC MEDICAL ADVICE (OUTPATIENT)
Dept: FAMILY MEDICINE | Facility: CLINIC | Age: 58
End: 2018-04-02

## 2018-04-02 DIAGNOSIS — H91.90 HEARING LOSS, UNSPECIFIED HEARING LOSS TYPE, UNSPECIFIED LATERALITY: Primary | ICD-10-CM

## 2018-04-10 ENCOUNTER — OFFICE VISIT (OUTPATIENT)
Dept: AUDIOLOGY | Facility: CLINIC | Age: 58
End: 2018-04-10
Payer: COMMERCIAL

## 2018-04-10 DIAGNOSIS — H90.3 SENSORINEURAL HEARING LOSS (SNHL) OF BOTH EARS: Primary | ICD-10-CM

## 2018-04-10 PROCEDURE — 92557 COMPREHENSIVE HEARING TEST: CPT | Performed by: AUDIOLOGIST

## 2018-04-10 PROCEDURE — 92550 TYMPANOMETRY & REFLEX THRESH: CPT | Performed by: AUDIOLOGIST

## 2018-04-10 NOTE — PROGRESS NOTES
AUDIOLOGY REPORT    SUBJECTIVE:  Gael Le is a 57 year old male who was seen in the Audiology Clinic at the [facility]  for audiologic evaluation, referred by Idania Marroquin MD. The patient reports a longstanding hearing loss for which he is aided binaurally. THe patient also reports longstanding tinnitus bilaterally. The patient denies otalgia, aural fullness, otorrhea, and dizziness.  The patient notes difficulty with communication in a variety of listening situations.      OBJECTIVE:    Otoscopic exam indicates ears are clear of cerumen bilaterally     Pure Tone Thresholds assessed using conventional audiometry with good  reliability from 250-8000 Hz bilaterally using insert earphones     RIGHT:  mild-moderate sensorineural hearing loss    LEFT:    mild-moderate sensorineural hearing loss    Tympanogram:    RIGHT: normal eardrum mobility    LEFT:   normal eardrum mobility    Reflexes (reported by stimulus ear):  RIGHT: Ipsilateral is present at normal levels  RIGHT: Contralateral is present at normal levels  LEFT:   Ipsilateral is present at normal levels  LEFT:   Contralateral is present at normal levels      Speech Reception Threshold:    RIGHT: 35 dB HL    LEFT:   30 dB HL  Word Recognition Score:     RIGHT: 100% at 75 dB HL using NU-6 recorded word list.    LEFT:   100% at 75 dB HL using NU-6 recorded word list.      ASSESSMENT:     ICD-10-CM    1. Sensorineural hearing loss (SNHL) of both ears H90.3 COMPREHENSIVE HEARING TEST     TYMPANOMETRY AND REFLEX THRESHOLD MEASUREMENTS       Compared to patient's previous audiogram, at an outside facility, dated 4/25/13, hearing has declined significantly bilaterally. Today s results were discussed with the patient in detail.     PLAN:  Patient was counseled regarding hearing loss and impact on communication. It is recommended that the patient be evaluated by ENT prior to consider options for newer amplification.  Please call this clinic  with questions regarding these results or recommendations.        Cholo Ly.  Doctor of Audiology  [place] License # [id number]

## 2018-04-10 NOTE — MR AVS SNAPSHOT
After Visit Summary   4/10/2018    Gael Le    MRN: [id number]           Patient Information     Date Of Birth          1960        Visit Information        Provider Department      4/10/2018 4:00 PM Lavelle Monsivais [facility]        Today's Diagnoses     Sensorineural hearing loss (SNHL) of both ears    -  1       Follow-ups after your visit        Who to contact     If you have questions or need follow up information about today's clinic visit or your schedule please contact [facility] directly at [phone].  Normal or non-critical lab and imaging results will be communicated to you by HipLogiqhart, letter or phone within 4 business days after the clinic has received the results. If you do not hear from us within 7 days, please contact the clinic through HipLog[employer] or phone. If you have a critical or abnormal lab result, we will notify you by phone as soon as possible.  Submit refill requests through [employer] or call your pharmacy and they will forward the refill request to us. Please allow 3 business days for your refill to be completed.          Additional Information About Your Visit        MyChart Information     [employer] gives you secure access to your electronic health record. If you see a primary care provider, you can also send messages to your care team and make appointments. If you have questions, please call your primary care clinic.  If you do not have a primary care provider, please call [phone] and they will assist you.      [employer] is an electronic gateway that provides easy, online access to your medical records. With [employer], you can request a clinic appointment, read your test results, renew a prescription or communicate with your care team.     To access your existing account, please contact your [facility] or call [phone] for assistance.        Care EveryWhere ID     This is your Care EveryWhere  ID. This could be used by other organizations to access your [facility] medical records  [id number]         Blood Pressure from Last 3 Encounters:   09/07/17 118/84   03/27/17 130/80   11/22/16 128/74    Weight from Last 3 Encounters:   09/07/17 173 lb (78.5 kg)   03/27/17 173 lb 6.4 oz (78.7 kg)   11/22/16 175 lb (79.4 kg)              We Performed the Following     AUDIOGRAM/TYMPANOGRAM - INTERFACE     COMPREHENSIVE HEARING TEST     TYMPANOMETRY AND REFLEX THRESHOLD MEASUREMENTS        Primary Care Provider Office Phone # Fax #    Idania Marroquin [phone] [address] [facility] [address]        Equal Access to Services     APRIL SILVA : Hadii aad ku hadasho Soomaali, waaxda luqadaha, qaybta kaalmada adeegyada, waxay idiin haybrittney cornejo . So [facility] [phone].    ATENCIÓN: Si habla español, tiene a fritz disposición servicios gratuitos de asistencia lingüística. [facility] [phone].    We comply with applicable federal civil rights laws and [place] laws. We do not discriminate on the basis of race, color, national origin, age, disability, sex, sexual orientation, or gender identity.            Thank you!     Thank you for choosing [facility]  for your care. Our goal is always to provide you with excellent care. Hearing back from our patients is one way we can continue to improve our services. Please take a few minutes to complete the written survey that you may receive in the mail after your visit with us. Thank you!             Your Updated Medication List - Protect others around you: Learn how to safely use, store and throw away your medicines at www.disposemymeds.org.          This list is accurate as of 4/10/18  4:55 PM.  Always use your most recent med list.                   Brand Name Dispense Instructions for use Diagnosis    ALPRAZolam 0.5 MG tablet    XANAX    30 tablet    TAKE 1 TABLET BY MOUTH THREE TIMES DAILY AS NEEDED FOR ANXIETY     Anxiety state       atorvastatin 20 MG tablet    LIPITOR    90 tablet    Take 1 tablet (20 mg) by mouth daily    Mixed hyperlipidemia       FISH OIL           gabapentin 100 MG capsule    NEURONTIN    30 capsule    Take 1 capsule (100 mg) by mouth nightly as needed Restless legs or anxiety    Restless legs syndrome (RLS), Anxiety as acute reaction to exceptional stress       MULTI-VITAMIN DAILY PO      Take  by mouth.        tadalafil 5 MG tablet    CIALIS    30 tablet    Hand written script for 7 mg capsules to different pharmacy for patient.  Do not use with nitroglycerin, terazosin or doxazosin.    Erectile dysfunction, unspecified erectile dysfunction type

## 2018-04-27 ENCOUNTER — DOCUMENTATION ONLY (OUTPATIENT)
Dept: OTHER | Facility: CLINIC | Age: 58
End: 2018-04-27

## 2018-04-27 PROBLEM — Z71.89 ADVANCED DIRECTIVES, COUNSELING/DISCUSSION: Chronic | Status: ACTIVE | Noted: 2018-04-27

## 2018-05-01 ENCOUNTER — DOCUMENTATION ONLY (OUTPATIENT)
Dept: LAB | Facility: CLINIC | Age: 58
End: 2018-05-01

## 2018-05-01 DIAGNOSIS — E04.2 NONTOXIC MULTINODULAR GOITER: ICD-10-CM

## 2018-05-01 DIAGNOSIS — E78.2 MIXED HYPERLIPIDEMIA: Primary | ICD-10-CM

## 2018-05-01 NOTE — PROGRESS NOTES
Please place or confirm lab orders for upcoming lab appointment on 5/9/2018, Physician appointment on 5/17/2018. CAMRON Mccullough CMA.

## 2018-05-02 ENCOUNTER — NURSE TRIAGE (OUTPATIENT)
Dept: NURSING | Facility: CLINIC | Age: 58
End: 2018-05-02

## 2018-05-02 ENCOUNTER — OFFICE VISIT (OUTPATIENT)
Dept: FAMILY MEDICINE | Facility: CLINIC | Age: 58
End: 2018-05-02
Payer: COMMERCIAL

## 2018-05-02 VITALS
HEART RATE: 83 BPM | HEIGHT: 70 IN | BODY MASS INDEX: 25.38 KG/M2 | OXYGEN SATURATION: 96 % | DIASTOLIC BLOOD PRESSURE: 80 MMHG | WEIGHT: 177.3 LBS | TEMPERATURE: 98.7 F | RESPIRATION RATE: 18 BRPM | SYSTOLIC BLOOD PRESSURE: 128 MMHG

## 2018-05-02 DIAGNOSIS — R07.9 ACUTE CHEST PAIN: Primary | ICD-10-CM

## 2018-05-02 DIAGNOSIS — E78.2 MIXED HYPERLIPIDEMIA: ICD-10-CM

## 2018-05-02 DIAGNOSIS — R53.83 OTHER FATIGUE: ICD-10-CM

## 2018-05-02 DIAGNOSIS — E04.2 NONTOXIC MULTINODULAR GOITER: ICD-10-CM

## 2018-05-02 DIAGNOSIS — R73.09 ELEVATED GLUCOSE: ICD-10-CM

## 2018-05-02 LAB
ALBUMIN SERPL-MCNC: 3.9 G/DL (ref 3.4–5)
ALP SERPL-CCNC: 74 U/L (ref 40–150)
ALT SERPL W P-5'-P-CCNC: 40 U/L (ref 0–70)
ANION GAP SERPL CALCULATED.3IONS-SCNC: 4 MMOL/L (ref 3–14)
AST SERPL W P-5'-P-CCNC: 28 U/L (ref 0–45)
BILIRUB SERPL-MCNC: 1 MG/DL (ref 0.2–1.3)
BUN SERPL-MCNC: 20 MG/DL (ref 7–30)
CALCIUM SERPL-MCNC: 9.8 MG/DL (ref 8.5–10.1)
CHLORIDE SERPL-SCNC: 107 MMOL/L (ref 94–109)
CO2 SERPL-SCNC: 29 MMOL/L (ref 20–32)
CREAT SERPL-MCNC: 0.89 MG/DL (ref 0.66–1.25)
CRP SERPL-MCNC: <2.9 MG/L (ref 0–8)
ERYTHROCYTE [DISTWIDTH] IN BLOOD BY AUTOMATED COUNT: 13.3 % (ref 10–15)
ERYTHROCYTE [SEDIMENTATION RATE] IN BLOOD BY WESTERGREN METHOD: 5 MM/H (ref 0–20)
GFR SERPL CREATININE-BSD FRML MDRD: 87 ML/MIN/1.7M2
GLUCOSE SERPL-MCNC: 100 MG/DL (ref 70–99)
HBA1C MFR BLD: 5.2 % (ref 0–5.6)
HCT VFR BLD AUTO: 48.1 % (ref 40–53)
HGB BLD-MCNC: 17 G/DL (ref 13.3–17.7)
MCH RBC QN AUTO: 32.2 PG (ref 26.5–33)
MCHC RBC AUTO-ENTMCNC: 35.3 G/DL (ref 31.5–36.5)
MCV RBC AUTO: 91 FL (ref 78–100)
PLATELET # BLD AUTO: 185 10E9/L (ref 150–450)
POTASSIUM SERPL-SCNC: 4.4 MMOL/L (ref 3.4–5.3)
PROT SERPL-MCNC: 7.7 G/DL (ref 6.8–8.8)
RBC # BLD AUTO: 5.28 10E12/L (ref 4.4–5.9)
SODIUM SERPL-SCNC: 140 MMOL/L (ref 133–144)
TROPONIN I SERPL-MCNC: <0.015 UG/L (ref 0–0.04)
TSH SERPL DL<=0.005 MIU/L-ACNC: 2.5 MU/L (ref 0.4–4)
WBC # BLD AUTO: 7.1 10E9/L (ref 4–11)

## 2018-05-02 PROCEDURE — 85652 RBC SED RATE AUTOMATED: CPT | Performed by: FAMILY MEDICINE

## 2018-05-02 PROCEDURE — 80053 COMPREHEN METABOLIC PANEL: CPT | Performed by: FAMILY MEDICINE

## 2018-05-02 PROCEDURE — 83036 HEMOGLOBIN GLYCOSYLATED A1C: CPT | Performed by: NURSE PRACTITIONER

## 2018-05-02 PROCEDURE — 85027 COMPLETE CBC AUTOMATED: CPT | Performed by: FAMILY MEDICINE

## 2018-05-02 PROCEDURE — 93000 ELECTROCARDIOGRAM COMPLETE: CPT | Performed by: NURSE PRACTITIONER

## 2018-05-02 PROCEDURE — 84443 ASSAY THYROID STIM HORMONE: CPT | Performed by: FAMILY MEDICINE

## 2018-05-02 PROCEDURE — 99214 OFFICE O/P EST MOD 30 MIN: CPT | Performed by: NURSE PRACTITIONER

## 2018-05-02 PROCEDURE — 86140 C-REACTIVE PROTEIN: CPT | Performed by: FAMILY MEDICINE

## 2018-05-02 PROCEDURE — 36415 COLL VENOUS BLD VENIPUNCTURE: CPT | Performed by: FAMILY MEDICINE

## 2018-05-02 PROCEDURE — 84484 ASSAY OF TROPONIN QUANT: CPT | Performed by: FAMILY MEDICINE

## 2018-05-02 ASSESSMENT — PAIN SCALES - GENERAL: PAINLEVEL: NO PAIN (0)

## 2018-05-02 NOTE — TELEPHONE ENCOUNTER
"  Reason for Disposition    [1] Chest pain lasting <= 5 minutes AND [2] NO chest pain or cardiac symptoms now(Exceptions: pains lasting a few seconds)    Additional Information    Negative: Severe difficulty breathing (e.g., struggling for each breath, speaks in single words)    Negative: Difficult to awaken or acting confused (e.g., disoriented, slurred speech)    Negative: Shock suspected (e.g., cold/pale/clammy skin, too weak to stand, low BP, rapid pulse)    Negative: [1] Chest pain lasts > 5 minutes AND [2] history of heart disease  (i.e., heart attack, bypass surgery, angina, angioplasty, CHF; not just a heart murmur)    Negative: [1] Chest pain lasts > 5 minutes AND [2] described as crushing, pressure-like, or heavy    Negative: [1] Chest pain lasts > 5 minutes AND [2] age > 50    Negative: [1] Chest pain lasts > 5 minutes AND [2] age > 30 AND [3] at least one cardiac risk factor (i.e., hypertension, diabetes, obesity, smoker or strong family history of heart disease)    Negative: [1] Chest pain lasts > 5 minutes AND [2] not relieved with nitroglycerin    Negative: Heart beating < 50 beats per minute OR > 140 beats per minute    Negative: Visible sweat on face or sweat dripping down face    Negative: Sounds like a life-threatening emergency to the triager    Negative: Followed a chest injury    Negative: SEVERE chest pain    Negative: [1] Intermittent  chest pain or \"angina\" AND [2] increasing in severity or frequency  (Exception: pains lasting a few seconds)    Negative: Pain also present in shoulder(s) or arm(s) or jaw  (Exception: pain is clearly made worse by movement)    Negative: Difficulty breathing    Negative: Dizziness or lightheadedness    Negative: Coughing up blood    Negative: Cocaine use within last 3 days    Negative: History of prior \"blood clot\" in leg or lungs (i.e., deep vein thrombosis, pulmonary embolism)    Negative: Recent illness requiring prolonged bedrest (i.e., immobilization)    " Negative: Hip or leg fracture in past 2 months (e.g., had cast on leg or ankle)    Negative: Major surgery in the past month    Negative: Recent long-distance travel with prolonged time in car, bus, plane, or train (i.e., within past 2 weeks; 6 or  more hours duration)    Negative: Chest pain lasts > 5 minutes (Exceptions: chest pain occurring > 3 days ago and now asymptomatic; same as previously diagnosed heartburn and has accompanying sour taste in mouth)    Negative: Taking a deep breath makes pain worse    Negative: Patient sounds very sick or weak to the triager    Negative: [1] Chest pain lasts > 5 minutes AND [2] occurred > 3 days ago (72 hours) AND [3] NO chest pain or cardiac symptoms now    Negative: Passed out (i.e., lost consciousness, collapsed and was not responding)    Protocols used: CHEST PAIN-ADULT-AH

## 2018-05-02 NOTE — TELEPHONE ENCOUNTER
"Patient calling reporting he woke x 1 during the night with pain on right side of chest that lasted 2-3 minutes. Pain resolved after rolling onto left side. Patient denied any other symptoms at the time. Patient stating he was sitting on couch this morning and experienced a 2nd episode of sharp right sided chest pain lasting \"seconds.\" Patient stating he is asymptomatic now.   Per guidelines advised to be seen with in 24 hours. Patient was advised for any chest pain lasting longer then 5 minutes to call 911, if symptoms return to be seen in Emergency Room. Advised to call back with any change or increase in symptoms.  Caller verbalized understanding. Denies further questions.    Lianne Muniz RN  [facility]      "

## 2018-05-02 NOTE — PROGRESS NOTES
SUBJECTIVE:   Gael Le is a 57 year old male who presents to clinic today for the following health issues:      CHEST PAIN     Onset: last night, middle of the night     Description:   Location:  right side arm and center of chest  Character: sharp  Radiation: on right side of arm  Duration: a couple  minutes     Intensity: mild/moderate    Progression of Symptoms:  improving    Accompanying Signs & Symptoms:  Shortness of breath: no  Sweating: no  Nausea/vomiting: no  Lightheadedness: no  Palpitations: no  Fever/Chills: no  Cough: no  Heartburn: no    History:   Family history of heart disease YES- patermal grandmother, father had a stroke  Tobacco use: no    Precipitating factors:   Worse with exertion: no  Worse with deep breaths :  no  Related to food: no    Alleviating factors:  Flipped over in bed and it seemed to go away       Therapies Tried and outcome: nothing    Chest pain first occurred last night. Mid to right sided chest pain that radiated down his right arm. Denies sweating or other symptoms during it. Moving/rolling to his left side made it improve. Was sitting on couch this morning occurred again, right chest, down right arm, throbbing feeling.  Last night had jalapeno and stuffed janelle peppers. He used a glove to chop them. Doesn't normally eat them. Has GERD-will treat with rolaids.  Sometimes feels he has trouble breathing, feels more related to congestion. Breath through his mouth no SOB. Breath through his nose notice more SOB. He does run every other day about 30 min. The last month has increased frequency from 2-3x/week to every other day. Lifting grandson recently-9 months solid kid maybe 30 lbs, lifted bikes off a rack over this weekend.     Has PACs, he notices it. Occurs about once a year.     HTN: BP at home around 140/90s    Takes gabapentin-taken maybe twice ever.   Alprazolam maybe once a month  Takes cialis daily, works well    Is a  at [employer]. Sits at a  desk most of the day.     Problem list and histories reviewed & adjusted, as indicated.  Additional history: as documented    Patient Active Problem List   Diagnosis     Rosacea     Anxiety state     Hyperlipidemia     Migraine     Supraventricular premature beats     Erectile dysfunction, unspecified erectile dysfunction type     Nontoxic multinodular goiter     Tinnitus     Advance Care Planning     Past Surgical History:   Procedure Laterality Date     COLONOSCOPY WITH CO2 INSUFFLATION N/A 11/21/2016    Procedure: COLONOSCOPY WITH CO2 INSUFFLATION;  Surgeon: Adeel Graf MD;  Location: [facility] OR     FINGER SURGERY       HERNIA REPAIR         Social History   Substance Use Topics     Smoking status: Never Smoker     Smokeless tobacco: Never Used     Alcohol use Yes     Family History   Problem Relation Age of Onset     Dementia Mother      multiinfarct dementia     DIABETES Father      CEREBROVASCULAR DISEASE Father      Skin Cancer Father      BCC     Myocardial Infarction Paternal Grandmother      Colon Cancer No family hx of      Prostate Cancer No family hx of      Asthma No family hx of          Current Outpatient Prescriptions   Medication Sig Dispense Refill     ALPRAZolam (XANAX) 0.5 MG tablet TAKE 1 TABLET BY MOUTH THREE TIMES DAILY AS NEEDED FOR ANXIETY 30 tablet 0     atorvastatin (LIPITOR) 20 MG tablet Take 1 tablet (20 mg) by mouth daily 90 tablet 3     FISH OIL        gabapentin (NEURONTIN) 100 MG capsule Take 1 capsule (100 mg) by mouth nightly as needed Restless legs or anxiety 30 capsule 0     Multiple Vitamin (MULTI-VITAMIN DAILY PO) Take  by mouth.       tadalafil (CIALIS) 5 MG tablet Hand written script for 7 mg capsules to different pharmacy for patient.  Do not use with nitroglycerin, terazosin or doxazosin. 30 tablet 3     Allergies   Allergen Reactions     Amoxicillin Hives     Lisinopril-Hydrochlorothiazide Cough     Penicillins Hives       Reviewed and updated as needed this visit  "by clinical staff  Tobacco  Allergies  Meds  Problems  Med Hx  Surg Hx  Fam Hx  Soc Hx        Reviewed and updated as needed this visit by Provider  Allergies  Meds  Problems  Surg Hx  Fam Hx         ROS:  Constitutional, HEENT, cardiovascular, pulmonary, gi and gu systems are negative, except as otherwise noted.    OBJECTIVE:     /80 (BP Location: Right arm, Patient Position: Sitting, Cuff Size: Adult Regular)  Pulse 83  Temp 98.7  F (37.1  C) (Oral)  Resp 18  Ht 1.765 m (5' 9.5\")  Wt 80.4 kg (177 lb 4.8 oz)  SpO2 96%  BMI 25.81 kg/m2  Body mass index is 25.81 kg/(m^2).  GENERAL: healthy, alert and no distress  EYES: Eyes grossly normal to inspection, PERRL and conjunctivae and sclerae normal  HENT: ear canals and TM's normal, nose and mouth without ulcers or lesions  NECK: no adenopathy, no asymmetry, masses, or scars and thyroid normal to palpation  RESP: lungs clear to auscultation - no rales, rhonchi or wheezes  CV: regular rate and rhythm, normal S1 S2, no S3 or S4, no murmur, click or rub, no peripheral edema. No chest pain with/without palpation  ABDOMEN: soft, nontender, no hepatosplenomegaly, no masses and bowel sounds normal  MS: no gross musculoskeletal defects noted, no edema    Diagnostic Test Results:  Results for orders placed or performed in visit on 05/02/18 (from the past 24 hour(s))   EKG 12-lead complete w/read - Clinics    Impression    NSR. No changes from previous EKG through [facility].  KATH Herzog, NP-C     **Comprehensive metabolic panel FUTURE anytime   Result Value Ref Range    Sodium 140 133 - 144 mmol/L    Potassium 4.4 3.4 - 5.3 mmol/L    Chloride 107 94 - 109 mmol/L    Carbon Dioxide 29 20 - 32 mmol/L    Anion Gap 4 3 - 14 mmol/L    Glucose 100 (H) 70 - 99 mg/dL    Urea Nitrogen 20 7 - 30 mg/dL    Creatinine 0.89 0.66 - 1.25 mg/dL    GFR Estimate 87 >60 mL/min/1.7m2    GFR Estimate If Black >90 >60 mL/min/1.7m2    Calcium 9.8 8.5 - 10.1 mg/dL    " Bilirubin Total 1.0 0.2 - 1.3 mg/dL    Albumin 3.9 3.4 - 5.0 g/dL    Protein Total 7.7 6.8 - 8.8 g/dL    Alkaline Phosphatase 74 40 - 150 U/L    ALT 40 0 - 70 U/L    AST 28 0 - 45 U/L   **TSH with free T4 reflex FUTURE anytime   Result Value Ref Range    TSH 2.50 0.40 - 4.00 mU/L   Troponin I   Result Value Ref Range    Troponin I ES <0.015 0.000 - 0.045 ug/L   CBC with platelets   Result Value Ref Range    WBC 7.1 4.0 - 11.0 10e9/L    RBC Count 5.28 4.4 - 5.9 10e12/L    Hemoglobin 17.0 13.3 - 17.7 g/dL    Hematocrit 48.1 40.0 - 53.0 %    MCV 91 78 - 100 fl    MCH 32.2 26.5 - 33.0 pg    MCHC 35.3 31.5 - 36.5 g/dL    RDW 13.3 10.0 - 15.0 %    Platelet Count 185 150 - 450 10e9/L   CRP, inflammation   Result Value Ref Range    CRP Inflammation <2.9 0.0 - 8.0 mg/L   Erythrocyte sedimentation rate auto   Result Value Ref Range    Sed Rate 5 0 - 20 mm/h   Hemoglobin A1c   Result Value Ref Range    Hemoglobin A1C 5.2 0 - 5.6 %       ASSESSMENT/PLAN:       1. Acute chest pain  Right sided chest pain.  Exam benign. Has FH of heart disease. First time this occurred. EKG normal, pain improved with movement. Had hot peppers for dinner for the first time in a very long time the night before-possible this was a unusual presentation of GERD. Has GERD at times but states this pain felt different than that. Check labs-mostly negative. Go to ER if occurs again/lasts longer than 5 min.   - EKG 12-lead complete w/read - Clinics  - **Comprehensive metabolic panel FUTURE anytime  - Troponin I  - CBC with platelets  - CRP, inflammation  - Erythrocyte sedimentation rate auto    2. Mixed hyperlipidemia  Check labs. Will need to return for fasting cholesterol check also  - Comprehensive metabolic panel FUTURE anytime    3. Nontoxic multinodular goiter  Check labs, see if related to chest pain  - TSH with free T4 reflex FUTURE anytime    4. Other fatigue  Check labs. CBC normal  - CBC with platelets    FUTURE APPOINTMENTS:       - Follow-up  visit pending labs  -also has physical with Dr. Marroquin in 1-2 weeks, will send to MD also for further suggestions.     KATH Herzog, NP-C  [facility]

## 2018-05-02 NOTE — MR AVS SNAPSHOT
After Visit Summary   5/2/2018    Gael Le    MRN: [id number]           Patient Information     Date Of Birth          1960        Visit Information        Provider Department      5/2/2018 7:20 AM Brielle Zambrano NP [facility]        Today's Diagnoses     Acute chest pain    -  1    Mixed hyperlipidemia        Nontoxic multinodular goiter        Other fatigue        Elevated glucose           Follow-ups after your visit        Your next 10 appointments already scheduled     May 09, 2018  8:20 AM CDT   Lab visit with [facility] ONLY   [facility] ([facility]    [address] [address] [phone]           Please do not eat 10-12 hours before your appointment if you are coming in fasting for labs on lipids, cholesterol, or glucose (sugar). Does not apply to pregnant women.  Water with medications is okay. Do not drink coffee or other fluids.  If you have concerns about taking your medications, please send a message by clicking on Secure Messaging, Message Your Care Team.            May 17, 2018  7:00 AM CDT   PHYSICAL with Idania Marroquin MD   [facility] ([facility]    [address] [address] [phone]              Future tests that were ordered for you today     Open Future Orders        Priority Expected Expires Ordered    Thyroid peroxidase antibody Routine  5/1/2019 5/1/2018    Lipid panel reflex to direct LDL Fasting Routine 5/1/2018 5/1/2019 5/1/2018            Who to contact     If you have questions or need follow up information about today's clinic visit or your schedule please contact [facility] directly at [phone].  Normal or non-critical lab and imaging results will be communicated to you by MyChart, letter or phone within 4 business days after the clinic has received the results. If you do not hear from us within 7 days,  "please contact the clinic through [employer] or phone. If you have a critical or abnormal lab result, we will notify you by phone as soon as possible.  Submit refill requests through [employer] or call your pharmacy and they will forward the refill request to us. Please allow 3 business days for your refill to be completed.          Additional Information About Your Visit        [employer] Information     [employer] gives you secure access to your electronic health record. If you see a primary care provider, you can also send messages to your care team and make appointments. If you have questions, please call your primary care clinic.  If you do not have a primary care provider, please call [phone] and they will assist you.        Care EveryWhere ID     This is your Care EveryWhere ID. This could be used by other organizations to access your [place] medical records  [id number]        Your Vitals Were     Pulse Temperature Respirations Height Pulse Oximetry BMI (Body Mass Index)    83 98.7  F (37.1  C) (Oral) 18 1.765 m (5' 9.5\") 96% 25.81 kg/m2       Blood Pressure from Last 3 Encounters:   05/02/18 128/80   09/07/17 118/84   03/27/17 130/80    Weight from Last 3 Encounters:   05/02/18 80.4 kg (177 lb 4.8 oz)   09/07/17 78.5 kg (173 lb)   03/27/17 78.7 kg (173 lb 6.4 oz)              We Performed the Following     **Comprehensive metabolic panel FUTURE anytime     **TSH with free T4 reflex FUTURE anytime     CBC with platelets     CRP, inflammation     EKG 12-lead complete w/read - Clinics     Erythrocyte sedimentation rate auto     Hemoglobin A1c     Troponin I        Primary Care Provider Office Phone # Fax #    Idania Marroquin [phone] [address]  [facility] [address]        Equal Access to Services     APRIL SILVA : Soo King, penelope velez, saeed andino. So [facility] [phone].    ATENCIÓN: Si habla " español, tiene a fritz disposición servicios gratuitos de asistencia lingüística. Divine mckeon [phone].    We comply with applicable federal civil rights laws and [place] laws. We do not discriminate on the basis of race, color, national origin, age, disability, sex, sexual orientation, or gender identity.            Thank you!     Thank you for choosing [facility]  for your care. Our goal is always to provide you with excellent care. Hearing back from our patients is one way we can continue to improve our services. Please take a few minutes to complete the written survey that you may receive in the mail after your visit with us. Thank you!             Your Updated Medication List - Protect others around you: Learn how to safely use, store and throw away your medicines at www.disposemymeds.org.          This list is accurate as of 5/2/18 12:47 PM.  Always use your most recent med list.                   Brand Name Dispense Instructions for use Diagnosis    ALPRAZolam 0.5 MG tablet    XANAX    30 tablet    TAKE 1 TABLET BY MOUTH THREE TIMES DAILY AS NEEDED FOR ANXIETY    Anxiety state       atorvastatin 20 MG tablet    LIPITOR    90 tablet    Take 1 tablet (20 mg) by mouth daily    Mixed hyperlipidemia       FISH OIL           gabapentin 100 MG capsule    NEURONTIN    30 capsule    Take 1 capsule (100 mg) by mouth nightly as needed Restless legs or anxiety    Restless legs syndrome (RLS), Anxiety as acute reaction to exceptional stress       MULTI-VITAMIN DAILY PO      Take  by mouth.        tadalafil 5 MG tablet    CIALIS    30 tablet    Hand written script for 7 mg capsules to different pharmacy for patient.  Do not use with nitroglycerin, terazosin or doxazosin.    Erectile dysfunction, unspecified erectile dysfunction type

## 2018-05-07 NOTE — PROGRESS NOTES
"Your thyroid testing is normal.  Your blood sugar is just above the upper limits of normal.  This is technically in the \"prediabetic\" range.  Exercise and limiting carbohydrate and sugars in diet can be helpful at preventing progression to diabetes.  Your kidney and liver testing are normal.   I see you have an appointment in a little over a week.  We can discuss these results and your other recent appointment at that time.  Please call or MyChart message me if you have any questions.   PSK  "

## 2018-05-09 DIAGNOSIS — E04.2 NONTOXIC MULTINODULAR GOITER: ICD-10-CM

## 2018-05-09 DIAGNOSIS — E78.2 MIXED HYPERLIPIDEMIA: ICD-10-CM

## 2018-05-09 LAB
CHOLEST SERPL-MCNC: 167 MG/DL
HDLC SERPL-MCNC: 55 MG/DL
LDLC SERPL CALC-MCNC: 88 MG/DL
NONHDLC SERPL-MCNC: 112 MG/DL
TRIGL SERPL-MCNC: 120 MG/DL

## 2018-05-09 PROCEDURE — 86376 MICROSOMAL ANTIBODY EACH: CPT | Performed by: FAMILY MEDICINE

## 2018-05-09 PROCEDURE — 80061 LIPID PANEL: CPT | Performed by: FAMILY MEDICINE

## 2018-05-09 PROCEDURE — 36415 COLL VENOUS BLD VENIPUNCTURE: CPT | Performed by: FAMILY MEDICINE

## 2018-05-10 LAB — THYROPEROXIDASE AB SERPL-ACNC: 12 IU/ML

## 2018-05-14 NOTE — PROGRESS NOTES
Your cholesterol is under very good control with the current treatment with Lipitor.  I would recommend you continue this treatment.  Your thyroid testing is normal.  See you later this week for your appointment.  Please call or MyChart message me if you have any questions.     YOBANYK

## 2018-05-17 ENCOUNTER — OFFICE VISIT (OUTPATIENT)
Dept: FAMILY MEDICINE | Facility: CLINIC | Age: 58
End: 2018-05-17
Payer: COMMERCIAL

## 2018-05-17 VITALS
SYSTOLIC BLOOD PRESSURE: 128 MMHG | DIASTOLIC BLOOD PRESSURE: 82 MMHG | TEMPERATURE: 97.9 F | WEIGHT: 174 LBS | BODY MASS INDEX: 24.91 KG/M2 | HEIGHT: 70 IN | HEART RATE: 73 BPM | OXYGEN SATURATION: 97 %

## 2018-05-17 DIAGNOSIS — M21.40 PES PLANUS, UNSPECIFIED LATERALITY: ICD-10-CM

## 2018-05-17 DIAGNOSIS — G89.29 CHRONIC BILATERAL LOW BACK PAIN WITHOUT SCIATICA: ICD-10-CM

## 2018-05-17 DIAGNOSIS — M79.672 LEFT FOOT PAIN: ICD-10-CM

## 2018-05-17 DIAGNOSIS — K21.9 GASTROESOPHAGEAL REFLUX DISEASE WITHOUT ESOPHAGITIS: ICD-10-CM

## 2018-05-17 DIAGNOSIS — F41.1 ANXIETY STATE: ICD-10-CM

## 2018-05-17 DIAGNOSIS — N52.9 ERECTILE DYSFUNCTION, UNSPECIFIED ERECTILE DYSFUNCTION TYPE: ICD-10-CM

## 2018-05-17 DIAGNOSIS — R39.11 BENIGN PROSTATIC HYPERPLASIA WITH URINARY HESITANCY: ICD-10-CM

## 2018-05-17 DIAGNOSIS — E78.2 MIXED HYPERLIPIDEMIA: ICD-10-CM

## 2018-05-17 DIAGNOSIS — M54.50 CHRONIC BILATERAL LOW BACK PAIN WITHOUT SCIATICA: ICD-10-CM

## 2018-05-17 DIAGNOSIS — N40.1 BENIGN PROSTATIC HYPERPLASIA WITH URINARY HESITANCY: ICD-10-CM

## 2018-05-17 DIAGNOSIS — Z00.00 ROUTINE GENERAL MEDICAL EXAMINATION AT A HEALTH CARE FACILITY: Primary | ICD-10-CM

## 2018-05-17 PROCEDURE — 99396 PREV VISIT EST AGE 40-64: CPT | Performed by: FAMILY MEDICINE

## 2018-05-17 PROCEDURE — 99214 OFFICE O/P EST MOD 30 MIN: CPT | Mod: 25 | Performed by: FAMILY MEDICINE

## 2018-05-17 RX ORDER — ALPRAZOLAM 0.5 MG
TABLET ORAL
Qty: 30 TABLET | Refills: 0 | Status: SHIPPED | OUTPATIENT
Start: 2018-05-17 | End: 2019-01-28

## 2018-05-17 RX ORDER — ATORVASTATIN CALCIUM 20 MG/1
20 TABLET, FILM COATED ORAL DAILY
Qty: 90 TABLET | Refills: 3 | Status: SHIPPED | OUTPATIENT
Start: 2018-05-17 | End: 2019-06-11

## 2018-05-17 RX ORDER — TAMSULOSIN HYDROCHLORIDE 0.4 MG/1
0.4 CAPSULE ORAL DAILY
Qty: 30 CAPSULE | Refills: 1 | Status: SHIPPED | OUTPATIENT
Start: 2018-05-17 | End: 2020-07-06 | Stop reason: ALTCHOICE

## 2018-05-17 ASSESSMENT — PAIN SCALES - GENERAL: PAINLEVEL: NO PAIN (0)

## 2018-05-17 NOTE — PROGRESS NOTES
SUBJECTIVE:   CC: Gael Le is an 57 year old male who presents for preventative health visit.       Healthy Habits:    Answers for HPI/ROS submitted by the patient on 5/13/2018   Annual Exam:  Getting at least 3 servings of Calcium per day:: Yes  Bi-annual eye exam:: Yes  Dental care twice a year:: Yes  Sleep apnea or symptoms of sleep apnea:: Daytime drowsiness, Excessive snoring  Diet:: Regular (no restrictions)  Frequency of exercise:: 4-5 days/week  running  Taking medications regularly:: Yes  Medication side effects:: None  Additional concerns today:: YES  PHQ-2 Score: 0  Duration of exercise:: 30-45 minutes        Chest Pain  Chest pain has resolved ever since episode in early May - workup negative - labs and EKG . He noted that he may have strained a muscle after picking up his grandson.     Hearing  Chronic tinnitus is noted to be same as previous. He had audiology testing in the past and wears hearing aid. Father had hearing aids in 40s.    Pain Left Foot  Pain on dorsum of right foot noted that has been going on for years. He noted that pain occurs after wearing tight shoes or shoes that are not to tight. He also noted pain flares when crossing left foot on right foot. He noted it will wake him up at night when sleeping on stomach and crosses his feet. It doesn't hurt now with palpation. He wears specific shoes with orthotics  for relief. He takes Advil for relief . He noted having bone spurts on the foot. He denies walking bare foot.     Dysphagia  He noted that as he was eating carrots within the past couple weeks when carrot became lodged down lower in the throat area. It was noted to have been stuck for 30 minutes and could he could not bring it down with water. Rice, Chicken, and anything dry. He noted that this symptoms have become worst as he got older. First episode occurred at the age of  11 after eating bread. However, bread is noted to no longer induce dysphagia. He noted that he can  "tolerate meats. He has not completed an endoscopy in the past.      GERD  He noted that he has a lot of heart burn. He noted that it occurs every second or third day after eating pizza. He takes Tums and gaviscon.  After taking antacid, he noted that this resolves symptoms.     Low back pain  Pain flares is noted to be intermittent and primarily local localized on one lateral sides of the low back. Pain is noted to alternate between sides. Pain was noted to radiated down to buttocks area at times, and not down legs. He noted that he was helping his daughter move, and was picking up boxes repeatedly.  Since running, back pain is noted to be the same. However, he noted that after running if back pain becomes present, it will resolve after a few hours.     Prostate Cancer Screening and   Last screened in September 17 and was in normal range of 0.85. He reported that he still has difficulty starting stream that was noticed a few years ago, \"he has urges to urinate, but cannot go\". He noted an event of going to the theater drinking a lot of soda, but unable to begin stream to urinate.  He noted urinary symptoms of \"taking longer to start stream\" and \"urinary symptoms\" are primarily at night.     Labs   Previous labs done on 5/2/18 and 5/9/18 reviewed and discussed with patient. Glucose was elevated and patient noted that he was not fasting at that time.     Thyroid  Patient has an appointments with Endocrinologist in July, for three year follow up on goiter.     Hyperlipidemia Follow-Up      Rate your low fat/cholesterol diet?: good    Taking statin?  Yes    Other lipid medications/supplements?:  Fish oil/Omega 3, without side effects    BP Follow-up      Outpatient blood pressures are being checked at home.  Results are better since working out in the past 141/86, 154/88 (januray and february), but more recent they are more lower. He noted that these recordings are after sitting for a couple minutes. Last night was " "137/97, but couple days ago was 158/88.       BP Readings from Last 3 Encounters:   05/17/18 128/82   05/02/18 128/80   09/07/17 118/84         Anxiety Follow-Up    Patient uses Xanax once a month. He has only used gabapentin twice, since he has not found improvement to symptoms with this. He noted that he is \"hypochondriac\".           Erectile Dysfunction  Patient takes Cialis 7mg with toleration. He noted that he gets this prescription filled every two months.            Today's PHQ-2 Score:   PHQ-2 ( 1999 Pfizer) 5/13/2018 9/6/2017   Q1: Little interest or pleasure in doing things 0 0   Q2: Feeling down, depressed or hopeless 0 0   PHQ-2 Score 0 0   Q1: Little interest or pleasure in doing things Not at all Not at all   Q2: Feeling down, depressed or hopeless Not at all Not at all   PHQ-2 Score 0 0       Abuse: Current or Past(Physical, Sexual or Emotional)- No  Do you feel safe in your environment - Yes    Social History   Substance Use Topics     Smoking status: Never Smoker     Smokeless tobacco: Never Used     Alcohol use Yes      If you drink alcohol do you typically have >3 drinks per day or >7 drinks per week? No                      Last PSA:   PSA   Date Value Ref Range Status   09/01/2017 0.85 0 - 4 ug/L Final     Comment:     Assay Method:  Chemiluminescence using Siemens Vista analyzer       Reviewed orders with patient. Reviewed health maintenance and updated orders accordingly - Yes  Labs reviewed in EPIC  BP Readings from Last 3 Encounters:   05/17/18 128/82   05/02/18 128/80   09/07/17 118/84    Wt Readings from Last 3 Encounters:   05/17/18 78.9 kg (174 lb)   05/02/18 80.4 kg (177 lb 4.8 oz)   09/07/17 78.5 kg (173 lb)                  Patient Active Problem List   Diagnosis     Rosacea     Anxiety state     Hyperlipidemia     Migraine     Supraventricular premature beats     Erectile dysfunction, unspecified erectile dysfunction type     Nontoxic multinodular goiter     Tinnitus     Advance Care " Planning     Past Surgical History:   Procedure Laterality Date     COLONOSCOPY WITH CO2 INSUFFLATION N/A 11/21/2016    Procedure: COLONOSCOPY WITH CO2 INSUFFLATION;  Surgeon: Adeel Graf MD;  Location: [facility] OR     FINGER SURGERY       HERNIA REPAIR         Social History   Substance Use Topics     Smoking status: Never Smoker     Smokeless tobacco: Never Used     Alcohol use Yes     Family History   Problem Relation Age of Onset     Dementia Mother      multiinfarct dementia     DIABETES Father      CEREBROVASCULAR DISEASE Father      Skin Cancer Father      BCC     Myocardial Infarction Paternal Grandmother      Colon Cancer No family hx of      Prostate Cancer No family hx of      Asthma No family hx of          Current Outpatient Prescriptions   Medication Sig Dispense Refill     ALPRAZolam (XANAX) 0.5 MG tablet TAKE 1 TABLET BY MOUTH THREE TIMES DAILY AS NEEDED FOR ANXIETY 30 tablet 0     atorvastatin (LIPITOR) 20 MG tablet Take 1 tablet (20 mg) by mouth daily 90 tablet 3     FISH OIL        gabapentin (NEURONTIN) 100 MG capsule Take 1 capsule (100 mg) by mouth nightly as needed Restless legs or anxiety 30 capsule 0     Multiple Vitamin (MULTI-VITAMIN DAILY PO) Take  by mouth.       tadalafil (CIALIS) 5 MG tablet Hand written script for 7 mg capsules to different pharmacy for patient.  Do not use with nitroglycerin, terazosin or doxazosin. 30 tablet 3     Allergies   Allergen Reactions     Amoxicillin Hives     Lisinopril-Hydrochlorothiazide Cough     Penicillins Hives     Recent Labs   Lab Test  05/09/18   0819  05/02/18   0755  09/01/17   0837  05/09/17   0833  10/13/16   1000   A1C   --   5.2   --    --    --    LDL  88   --   89  196*  115*   HDL  55   --   52  50  41   TRIG  120   --   186*  188*  224*   ALT   --   40  45   --   45   CR   --   0.89   --    --   0.88   GFRESTIMATED   --   87   --    --   88   GFRESTBLACK   --   >90   --    --   >90   GFR Calc     POTASSIUM   --    "4.4   --    --   4.4   TSH   --   2.50   --    --    --         Reviewed and updated as needed this visit by clinical staff  Tobacco  Allergies  Meds  Med Hx  Surg Hx  Fam Hx  Soc Hx        Reviewed and updated as needed this visit by Provider  Tobacco  Allergies  Meds  Med Hx  Surg Hx  Fam Hx  Soc Hx       Past Medical History:   Diagnosis Date     Hypertension       Past Surgical History:   Procedure Laterality Date     COLONOSCOPY WITH CO2 INSUFFLATION N/A 11/21/2016    Procedure: COLONOSCOPY WITH CO2 INSUFFLATION;  Surgeon: Adeel Graf MD;  Location: [facility] OR     FINGER SURGERY       HERNIA REPAIR         ROS:  10 point ROS of systems including Constitutional, Eyes, Respiratory, Cardiovascular, Gastroenterology, Genitourinary, Integumentary, Muscularskeletal, Psychiatric were all negative except for pertinent positives noted in my HPI.    This document serves as a record of the services and decisions personally performed and made by Idania Marroquin MD. It was created on her behalf by Gabino Roger, a trained medical scribe. The creation of this document is based on the provider's statements to the medical scribe.  Gabino Roger May 17, 2018 7:01 AM      OBJECTIVE:   /82 (BP Location: Right arm, Patient Position: Chair, Cuff Size: Adult Regular)  Pulse 73  Temp 97.9  F (36.6  C) (Oral)  Ht 1.765 m (5' 9.5\")  Wt 78.9 kg (174 lb)  SpO2 97%  BMI 25.33 kg/m2  EXAM:  GENERAL: alert, no distress and over weight  EYES: Eyes grossly normal to inspection, PERRL and conjunctivae and sclerae normal  HENT: ear canals and TM's normal, nose and mouth without ulcers or lesions  NECK: no adenopathy, no asymmetry, masses, or scars and thyroid normal to palpation  RESP: lungs clear to auscultation - no rales, rhonchi or wheezes  CV: regular rate and rhythm, normal S1 S2, no S3 or S4, no murmur, click or rub, no peripheral edema and peripheral pulses strong  ABDOMEN: soft, nontender, no " hepatosplenomegaly, no masses and bowel sounds normal  RECTAL: normal sphincter tone, no rectal masses and prostate 1+ enlarged, nontender  MS: extremities normal- no gross deformities noted. complaints of discomfort lower lumbar area bilaterally but no current pain. No foot pain or tenderness currently. Normal exam right dorsal foot. Pes planus   SKIN: no suspicious lesions or rashes  NEURO: Normal strength and tone, mentation intact and speech normal  PSYCH: mentation appears normal, affect normal/bright    ASSESSMENT/PLAN:   1. Routine general medical examination at a health care facility  Labs reviewed.  Screening up to date.  Entered by Idania Marroquin MD at 5/14/2018  8:02 AM   Read by Gael Le at 5/14/2018  8:06 AM   Your cholesterol is under very good control with the current treatment with Lipitor.  I would recommend you continue this treatment.  Your thyroid testing is normal.   See you later this week for your appointment.   Please call or MyChart message me if you have any questions.      PSK         2. Anxiety state  Controlled. Patient will continue taking as directed.   - ALPRAZolam (XANAX) 0.5 MG tablet; TAKE 1 TABLET BY MOUTH THREE TIMES DAILY AS NEEDED FOR ANXIETY  Dispense: 30 tablet; Refill: 0    3. Mixed hyperlipidemia  Controlled. Patient will continue taking as directed.   - atorvastatin (LIPITOR) 20 MG tablet; Take 1 tablet (20 mg) by mouth daily  Dispense: 90 tablet; Refill: 3    4. Erectile dysfunction, unspecified erectile dysfunction type  Stable with Cialis. Patient will continue taking as directed. Recheck in September-October planned.    5. Benign prostatic hyperplasia with urinary hesitancy  Patient will begin taking as directed.   - tamsulosin (FLOMAX) 0.4 MG capsule; Take 1 capsule (0.4 mg) by mouth daily  Dispense: 30 capsule; Refill: 1    6. Chronic bilateral low back pain without sciatica  Patient would benefit from exercise program to strengthen low back.  - [facility] PT, HAND,  "AND CHIROPRACTIC REFERRAL    7.  GERD  Use of antacids or zantac/pepcid for daily or premedication for the heart burn symptoms.  If not effective notify me planned.      8.  Foot pain - pes planus  Supportive shoes.  Avoid barefoot walking.  Podiatry referral if desired.          COUNSELING:  Reviewed preventive health counseling, as reflected in patient instructions       Regular exercise       Healthy diet/nutrition       Hearing screening       Prostate cancer screening       Osteoporosis Prevention/Bone Health       reports that he has never smoked. He has never used smokeless tobacco.    Estimated body mass index is 25.81 kg/(m^2) as calculated from the following:    Height as of 5/2/18: 1.765 m (5' 9.5\").    Weight as of 5/2/18: 80.4 kg (177 lb 4.8 oz).   Weight management plan: Discussed healthy diet and exercise guidelines and patient will follow up in 6 months in clinic to re-evaluate.    Counseling Resources:  ATP IV Guidelines  Pooled Cohorts Equation Calculator  FRAX Risk Assessment  ICSI Preventive Guidelines  Dietary Guidelines for Americans, 2010  USDA's MyPlate  ASA Prophylaxis  Lung CA Screening    The information in this document, created by the medical scribe for me, accurately reflects the services I personally performed and the decisions made by me. I have reviewed and approved this document for accuracy prior to leaving the patient care area.  May 17, 2018 7:50 AM      Idania Marroquin MD  [facility]      Patient Instructions   Monitor blood pressures and if they remain elevated we may begin blood pressure medication.     Begin Flomax as directed. If medication is well tolerated and effective a 90 day supply may be sent to the pharmacy.     Refill to medications sent to pharmacy. Take as directed.     If dysphagia worsens or persists, endoscopy is reccommended       Physical Therapy for low back pain made.       "

## 2018-05-17 NOTE — MR AVS SNAPSHOT
After Visit Summary   5/17/2018    Gael Le    MRN: [id number]           Patient Information     Date Of Birth          1960        Visit Information        Provider Department      5/17/2018 7:00 AM Idania Marroquin MD [facility]        Today's Diagnoses     Routine general medical examination at a health care facility    -  1    Anxiety state        Mixed hyperlipidemia        Erectile dysfunction, unspecified erectile dysfunction type        Benign prostatic hyperplasia with urinary hesitancy        Chronic bilateral low back pain without sciatica          Care Instructions    Monitor blood pressures and if they remain elevated we may begin blood pressure medication.     Begin Flomax as directed. If medication is well tolerated and effective a 90 day supply may be sent to the pharmacy.     Refill to medications sent to pharmacy. Take as directed.     If dysphagia worsens or persists, endoscopy is reccommended       Physical Therapy for low back pain made.       Preventive Health Recommendations  Male Ages 50 - 64    Yearly exam:             See your health care provider every year in order to  o   Review health changes.   o   Discuss preventive care.    o   Review your medicines if your doctor has prescribed any.     Have a cholesterol test every 5 years, or more frequently if you are at risk for high cholesterol/heart disease.     Have a diabetes test (fasting glucose) every three years. If you are at risk for diabetes, you should have this test more often.     Have a colonoscopy at age 50, or have a yearly FIT test (stool test). These exams will check for colon cancer.      Talk with your health care provider about whether or not a prostate cancer screening test (PSA) is right for you.    You should be tested each year for STDs (sexually transmitted diseases), if you re at risk.     Shots: Get a flu shot each year. Get a tetanus shot every 10 years.     Nutrition:    Eat  at least 5 servings of fruits and vegetables daily.     Eat whole-grain bread, whole-wheat pasta and brown rice instead of white grains and rice.     Talk to your provider about Calcium and Vitamin D.     Lifestyle    Exercise for at least 150 minutes a week (30 minutes a day, 5 days a week). This will help you control your weight and prevent disease.     Limit alcohol to one drink per day.     No smoking.     Wear sunscreen to prevent skin cancer.     See your dentist every six months for an exam and cleaning.     See your eye doctor every 1 to 2 years.            Follow-ups after your visit        Additional Services     [facility] PT, HAND, AND CHIROPRACTIC REFERRAL       **This order will print in the [facility] Scheduling Office**    Physical Therapy, Hand Therapy and Chiropractic Care are available through:    *[facility]  *[facility]  *[facility]    Call one number to schedule at any of the above locations: [phone].    Your provider has referred you to: Physical Therapy at [facility] or [facility]    Indication/Reason for Referral: Low Back Pain  Onset of Illness: chronic  Therapy Orders: Evaluate and Treat  Special Programs: None  Special Request: None    Brad Salinas      Additional Comments for the Therapist or Chiropractor: HEP    Please be aware that coverage of these services is subject to the terms and limitations of your health insurance plan.  Call member services at your health plan with any benefit or coverage questions.      Please bring the following to your appointment:    *Your personal calendar for scheduling future appointments  *Comfortable clothing                  Who to contact     If you have questions or need follow up information about today's clinic visit or your schedule please contact [facility] directly at [phone].  Normal or non-critical lab and imaging results will be communicated to you by MyChart, letter or phone within  "4 business days after the clinic has received the results. If you do not hear from us within 7 days, please contact the clinic through [employer] or phone. If you have a critical or abnormal lab result, we will notify you by phone as soon as possible.  Submit refill requests through [employer] or call your pharmacy and they will forward the refill request to us. Please allow 3 business days for your refill to be completed.          Additional Information About Your Visit        [employer] Information     [employer] gives you secure access to your electronic health record. If you see a primary care provider, you can also send messages to your care team and make appointments. If you have questions, please call your primary care clinic.  If you do not have a primary care provider, please call [phone] and they will assist you.        Care EveryWhere ID     This is your Care EveryWhere ID. This could be used by other organizations to access your [place] medical records  [id number]        Your Vitals Were     Pulse Temperature Height Pulse Oximetry BMI (Body Mass Index)       73 97.9  F (36.6  C) (Oral) 1.765 m (5' 9.5\") 97% 25.33 kg/m2        Blood Pressure from Last 3 Encounters:   05/17/18 128/82   05/02/18 128/80   09/07/17 118/84    Weight from Last 3 Encounters:   05/17/18 78.9 kg (174 lb)   05/02/18 80.4 kg (177 lb 4.8 oz)   09/07/17 78.5 kg (173 lb)              We Performed the Following     [facility] PT, HAND, AND CHIROPRACTIC REFERRAL          Today's Medication Changes          These changes are accurate as of 5/17/18  7:36 AM.  If you have any questions, ask your nurse or doctor.               Start taking these medicines.        Dose/Directions    tamsulosin 0.4 MG capsule   Commonly known as:  FLOMAX   Used for:  Benign prostatic hyperplasia with urinary hesitancy   Started by:  Idania Marroquin MD        Dose:  0.4 mg   Take 1 capsule (0.4 mg) by mouth daily   Quantity:  30 capsule   Refills:  1         These " medicines have changed or have updated prescriptions.        Dose/Directions    ALPRAZolam 0.5 MG tablet   Commonly known as:  XANAX   This may have changed:  See the new instructions.   Used for:  Anxiety state   Changed by:  Idania Marroquin MD        TAKE 1 TABLET BY MOUTH THREE TIMES DAILY AS NEEDED FOR ANXIETY   Quantity:  30 tablet   Refills:  0            Where to get your medicines      These medications were sent to [facility] - [place], [place] - [address]  [address], [facility] [address]     Phone:  [phone]     atorvastatin 20 MG tablet    tamsulosin 0.4 MG capsule         Some of these will need a paper prescription and others can be bought over the counter.  Ask your nurse if you have questions.     Bring a paper prescription for each of these medications     ALPRAZolam 0.5 MG tablet                Primary Care Provider Office Phone # Fax #    Idania Marroquin [phone] [address]  [facility] [address]        Equal Access to Services     [facility]: Hadii aad ku hadasho Soomaali, waaxda luqadaha, qaybta kaalmada adeegyada, waxay idiin hayaan nilameg kharakane cornejo . So [facility] [phone].    ATENCIÓN: Si habla español, tiene a fritz disposición servicios gratuitos de asistencia lingüística. Ll[facility] [phone].    We comply with applicable federal civil rights laws and [place] laws. We do not discriminate on the basis of race, color, national origin, age, disability, sex, sexual orientation, or gender identity.            Thank you!     Thank you for choosing [facility]  for your care. Our goal is always to provide you with excellent care. Hearing back from our patients is one way we can continue to improve our services. Please take a few minutes to complete the written survey that you may receive in the mail after your visit with us. Thank you!             Your Updated Medication List - Protect others around  you: Learn how to safely use, store and throw away your medicines at www.disposemymeds.org.          This list is accurate as of 5/17/18  7:36 AM.  Always use your most recent med list.                   Brand Name Dispense Instructions for use Diagnosis    ALPRAZolam 0.5 MG tablet    XANAX    30 tablet    TAKE 1 TABLET BY MOUTH THREE TIMES DAILY AS NEEDED FOR ANXIETY    Anxiety state       atorvastatin 20 MG tablet    LIPITOR    90 tablet    Take 1 tablet (20 mg) by mouth daily    Mixed hyperlipidemia       FISH OIL           gabapentin 100 MG capsule    NEURONTIN    30 capsule    Take 1 capsule (100 mg) by mouth nightly as needed Restless legs or anxiety    Restless legs syndrome (RLS), Anxiety as acute reaction to exceptional stress       MULTI-VITAMIN DAILY PO      Take  by mouth.        tadalafil 5 MG tablet    CIALIS    30 tablet    Hand written script for 7 mg capsules to different pharmacy for patient.  Do not use with nitroglycerin, terazosin or doxazosin.    Erectile dysfunction, unspecified erectile dysfunction type       tamsulosin 0.4 MG capsule    FLOMAX    30 capsule    Take 1 capsule (0.4 mg) by mouth daily    Benign prostatic hyperplasia with urinary hesitancy

## 2018-05-17 NOTE — PATIENT INSTRUCTIONS
Monitor blood pressures and if they remain elevated we may begin blood pressure medication.     Begin Flomax as directed. If medication is well tolerated and effective a 90 day supply may be sent to the pharmacy.     Refill to medications sent to pharmacy. Take as directed.     If dysphagia worsens or persists, endoscopy is reccommended       Physical Therapy for low back pain made.       Preventive Health Recommendations  Male Ages 50   64    Yearly exam:             See your health care provider every year in order to  o   Review health changes.   o   Discuss preventive care.    o   Review your medicines if your doctor has prescribed any.     Have a cholesterol test every 5 years, or more frequently if you are at risk for high cholesterol/heart disease.     Have a diabetes test (fasting glucose) every three years. If you are at risk for diabetes, you should have this test more often.     Have a colonoscopy at age 50, or have a yearly FIT test (stool test). These exams will check for colon cancer.      Talk with your health care provider about whether or not a prostate cancer screening test (PSA) is right for you.    You should be tested each year for STDs (sexually transmitted diseases), if you re at risk.     Shots: Get a flu shot each year. Get a tetanus shot every 10 years.     Nutrition:    Eat at least 5 servings of fruits and vegetables daily.     Eat whole-grain bread, whole-wheat pasta and brown rice instead of white grains and rice.     Talk to your provider about Calcium and Vitamin D.     Lifestyle    Exercise for at least 150 minutes a week (30 minutes a day, 5 days a week). This will help you control your weight and prevent disease.     Limit alcohol to one drink per day.     No smoking.     Wear sunscreen to prevent skin cancer.     See your dentist every six months for an exam and cleaning.     See your eye doctor every 1 to 2 years.

## 2018-05-18 PROBLEM — H91.93 HEARING DEFICIT, BILATERAL: Status: ACTIVE | Noted: 2018-05-18

## 2018-05-25 ENCOUNTER — THERAPY VISIT (OUTPATIENT)
Dept: PHYSICAL THERAPY | Facility: CLINIC | Age: 58
End: 2018-05-25
Payer: COMMERCIAL

## 2018-05-25 DIAGNOSIS — M54.42 ACUTE BILATERAL LOW BACK PAIN WITH LEFT-SIDED SCIATICA: Primary | ICD-10-CM

## 2018-05-25 PROCEDURE — 97110 THERAPEUTIC EXERCISES: CPT | Mod: GP | Performed by: PHYSICAL THERAPIST

## 2018-05-25 PROCEDURE — 97161 PT EVAL LOW COMPLEX 20 MIN: CPT | Mod: GP | Performed by: PHYSICAL THERAPIST

## 2018-05-25 NOTE — MR AVS SNAPSHOT
After Visit Summary   5/25/2018    Gael Le    MRN: [id number]           Patient Information     Date Of Birth          1960        Visit Information        Provider Department      5/25/2018 9:40 AM Billy Ratliff, PT [facility]        Today's Diagnoses     Acute bilateral low back pain with left-sided sciatica    -  1       Follow-ups after your visit        Your next 10 appointments already scheduled     Jun 01, 2018  4:30 PM CDT   [facility] Spine with Billy Ratliff PT   [facility])    [address]  [facility] [id number] [phone]            Jun 07, 2018  4:20 PM CDT   [facility] Spine with Billy Ratliff PT   [facility] ([facility])    [address]  [facility] [id number] [phone]              Who to contact     If you have questions or need follow up information about today's clinic visit or your schedule please contact [facility] directly at [phone].  Normal or non-critical lab and imaging results will be communicated to you by MyChart, letter or phone within 4 business days after the clinic has received the results. If you do not hear from us within 7 days, please contact the clinic through [employer] or phone. If you have a critical or abnormal lab result, we will notify you by phone as soon as possible.  Submit refill requests through [employer] or call your pharmacy and they will forward the refill request to us. Please allow 3 business days for your refill to be completed.          Additional Information About Your Visit        [employer] Online Information     [employer] gives you secure access to your electronic health record. If you see a primary care provider, you can also send messages to your care team and make  appointments. If you have questions, please call your primary care clinic.  If you do not have a primary care provider, please call [phone] and they will assist you.        Care EveryWhere ID     This is your Care EveryWhere ID. This could be used by other organizations to access your [place] medical records  [id number]         Blood Pressure from Last 3 Encounters:   05/17/18 128/82   05/02/18 128/80   09/07/17 118/84    Weight from Last 3 Encounters:   05/17/18 78.9 kg (174 lb)   05/02/18 80.4 kg (177 lb 4.8 oz)   09/07/17 78.5 kg (173 lb)              We Performed the Following     HC PT EVAL, LOW COMPLEXITY     HARISH INITIAL EVAL REPORT     THERAPEUTIC EXERCISES        Primary Care Provider Office Phone # Fax #    Idania Marroquin [phone] [address]  [facility] [address]        Equal Access to Services     APRIL SILVA : Hadii aad ku hadasho Soomaali, waaxda luqadaha, qaybta kaalmada adeegyada, waxay idiin hayaan mac khjuju cornejo . So [facility] [phone].    ATENCIÓN: Si kat oliva, tiene a fritz disposición servicios gratuitos de asistencia lingüística. Llame al [phone].    We comply with applicable federal civil rights laws and [place] laws. We do not discriminate on the basis of race, color, national origin, age, disability, sex, sexual orientation, or gender identity.            Thank you!     Thank you for choosing [facility]  for your care. Our goal is always to provide you with excellent care. Hearing back from our patients is one way we can continue to improve our services. Please take a few minutes to complete the written survey that you may receive in the mail after your visit with us. Thank you!             Your Updated Medication List - Protect others around you: Learn how to safely use, store and throw away your medicines at www.disposemymeds.org.          This list is accurate as of 5/25/18 10:32  AM.  Always use your most recent med list.                   Brand Name Dispense Instructions for use Diagnosis    ALPRAZolam 0.5 MG tablet    XANAX    30 tablet    TAKE 1 TABLET BY MOUTH THREE TIMES DAILY AS NEEDED FOR ANXIETY    Anxiety state       atorvastatin 20 MG tablet    LIPITOR    90 tablet    Take 1 tablet (20 mg) by mouth daily    Mixed hyperlipidemia       FISH OIL           gabapentin 100 MG capsule    NEURONTIN    30 capsule    Take 1 capsule (100 mg) by mouth nightly as needed Restless legs or anxiety    Restless legs syndrome (RLS), Anxiety as acute reaction to exceptional stress       MULTI-VITAMIN DAILY PO      Take  by mouth.        tadalafil 5 MG tablet    CIALIS    30 tablet    Hand written script for 7 mg capsules to different pharmacy for patient.  Do not use with nitroglycerin, terazosin or doxazosin.    Erectile dysfunction, unspecified erectile dysfunction type       tamsulosin 0.4 MG capsule    FLOMAX    30 capsule    Take 1 capsule (0.4 mg) by mouth daily    Benign prostatic hyperplasia with urinary hesitancy

## 2018-05-25 NOTE — PROGRESS NOTES
HPI                      System    Physical Exam                                         Musculoskeletal:        Arms:      ROS

## 2018-05-25 NOTE — PROGRESS NOTES
[facility] Initial Evaluation -- Lumbar    Date: May 25, 2018  Gael Le is a 57 year old male with a lumbar condition.   Referral: primary care  Work mechanical stresses:  sitting  Employment status:    Leisure mechanical stresses: running  Functional disability score (DOMINICK/STarT Back):  See DOMINICK in flowsheet  VAS score (0-10): 5/10 at worst  Patient goals/expectations:  Decrease pain    HISTORY:    Present symptoms: low back pain, can be bilateral or one side at a time, intermittent stiffness  Pain quality (sharp/shooting/stabbing/aching/burning/cramping):  achey   Paresthesia (yes/no):  none    Present since (onset date): 10 years, this episode two weeks ago (May 2018) from standing abruptly.     Symptoms (improving/unchanging/worsening):  improving.     Symptoms commenced as a result of: standing abuprtly   Condition occurred in the following environment:   home     Symptoms at onset (back/thigh/leg): B low back   Constant symptoms (back/thigh/leg): none  Intermittent symptoms (back/thigh/leg): Back pain    Symptoms are made worse with the following: Sometimes Bending, Time of day - No effect and Other - Lifting and twisting sometimes   Symptoms are made better with the following: Time of day - No effect and Other - temporarily ibuprofen and foam roling    Disturbed sleep (yes/no):  no Sleeping postures (prone/sup/side R/L): R side    Previous episodes (0/1-5/6-10/11+): once every three months for 10-15 years Year of first episode: 10+ years ago    Previous history: none  Previous treatments: not this episode, tried chiro  2 years ago without improvement      Specific Questions:  Cough/Sneeze/Strain (pos/neg): has had hx of B sciatica into thighs if strains (not this episode)  Bowel/Bladder (normal/abnormal): none  Gait (normal/abnormal): none  Medications (nil/NSAIDS/analg/steroids/anticoag/other):  NSAIDS and Other - atovostatin, xanax, cialis  Medical allergies:   penicillin  General health (excellent/good/fair/poor):  good  Pertinent medical history:  Thyroid problems and Migraines/Headaches  Imaging (None/Xray/MRI/Other):  None recent  Recent or major surgery (yes/no):  Bone cyst repair  Night pain (yes/no): no  Accidents (yes/no): none  Unexplained weight loss (yes/no): none  Barriers at home: none  Other red flags: none    EXAMINATION    Posture:   Sitting (good/fair/poor): poor  Standing (good/fair/poor):fair  Lordosis (red/acc/normal): normal  Correction of posture (better/worse/no effect): NE    Lateral Shift (right/left/nil): nil  Relevant (yes/no):  n/a  Other Observations: none    Neurological:    Motor deficit:  none  Reflexes:    Sensory deficit:  none  Dural signs:      Movement Loss:   Darío Mod Min Nil Pain   Flexion    x    Extension   x     Side Gliding R    x    Side Gliding L    x      Test Movements:   During: produces, abolishes, increases, decreases, no effect, centralizing, peripheralizing   After: better, worse, no better, no worse, no effect, centralized, peripheralized    Pretest symptoms standing:   x Symptoms During Symptoms After ROM increased ROM decreased No Effect   FIS        Rep FIS        EIS        Rep EIS        Pretest symptoms lying: none    Symptoms During Symptoms After ROM increased ROM decreased No Effect   MATEO        Rep MATEO        EIL No Effect No Effect      Rep EIL No Effect No Effect x     If required, pretest symptoms:    Symptoms During Symptoms After ROM increased ROM decreased No Effect   SGIS - R        Rep SGIS - R        SGIS - L        Rep SGIS - L          Static Tests:  Sitting slouched:   Sitting erect:    Standing slouched   Standing erect:    Lying prone in extension:   Long sitting:      Other Tests:     Provisional Classification:  Derangement - Bilateral, symmetrical, symptoms above knee    Principle of Management:  Education:  posture   Equipment provided:  none  Mechanical therapy (Y/N):  Y   Extension principle:   EIL 10 reps every 2-3 hrs  Lateral Principle:    Flexion principle:    Other:      ASSESSMENT/PLAN:    Patient is a 57 year old male with lumbar complaints.    Patient has the following significant findings with corresponding treatment plan.                Diagnosis 1:  Central symmetrical lumbar derangement  Pain -  manual therapy, self management, education, directional preference exercise and home program  Decreased ROM/flexibility - manual therapy, therapeutic exercise, therapeutic activity and home program  Decreased joint mobility - manual therapy, therapeutic exercise, therapeutic activity and home program  Inflammation - self management/home program  Decreased function - therapeutic activities and home program  Impaired posture - neuro re-education, therapeutic activities and home program    Therapy Evaluation Codes:   1) History comprised of:   Personal factors that impact the plan of care:      None.    Comorbidity factors that impact the plan of care are:      None.     Medications impacting care: None.  2) Examination of Body Systems comprised of:   Body structures and functions that impact the plan of care:      Lumbar spine.   Activity limitations that impact the plan of care are:      Lifting and Running.  3) Clinical presentation characteristics are:   Evolving/Changing.  4) Decision-Making    Low complexity using standardized patient assessment instrument and/or measureable assessment of functional outcome.  Cumulative Therapy Evaluation is: Low complexity.    Previous and current functional limitations:  (See Goal Flow Sheet for this information)    Short term and Long term goals: (See Goal Flow Sheet for this information)     Communication ability:  Patient appears to be able to clearly communicate and understand verbal and written communication and follow directions correctly.  Treatment Explanation - The following has been discussed with the patient:   RX ordered/plan of care  Anticipated  outcomes  Possible risks and side effects  This patient would benefit from PT intervention to resume normal activities.   Rehab potential is excellent.    Frequency:  1 X week, once daily  Duration:  for 6 weeks  Discharge Plan:  Achieve all LTG.  Independent in home treatment program.  Reach maximal therapeutic benefit.    Please refer to the daily flowsheet for treatment today, total treatment time and time spent performing 1:1 timed codes.

## 2018-06-04 DIAGNOSIS — N52.9 ERECTILE DYSFUNCTION, UNSPECIFIED ERECTILE DYSFUNCTION TYPE: ICD-10-CM

## 2018-06-04 RX ORDER — TADALAFIL 5 MG/1
TABLET ORAL
Qty: 30 TABLET | Refills: 3 | Status: CANCELLED | OUTPATIENT
Start: 2018-06-04

## 2018-06-04 NOTE — TELEPHONE ENCOUNTER
"Requested Prescriptions   Pending Prescriptions Disp Refills     tadalafil (CIALIS) 5 MG tablet 30 tablet 3     Sig: Hand written script for 7 mg capsules to different pharmacy for patient.  Do not use with nitroglycerin, terazosin or doxazosin.    Erectile Dysfuction Protocol Failed    6/4/2018  9:43 AM       Failed - Absence of Alpha Blockers on Med list       Passed - Absence of nitrates on medication list       Passed - Recent (12 mo) or future (30 days) visit within the authorizing provider's specialty    Patient had office visit in the last 12 months or has a visit in the next 30 days with authorizing provider or within the authorizing provider's specialty.  See \"Patient Info\" tab in inbasket, or \"Choose Columns\" in Meds & Orders section of the refill encounter.           Passed - Patient is age 18 or older        tadalafil (CIALIS) 5 MG tablet  Last Written Prescription Date:  12/4/17  Last Fill Quantity: 30,  # refills: 3   Last office visit: 5/17/2018 with prescribing provider:  Dr. Marroquin   Future Office Visit:      "

## 2018-06-05 NOTE — TELEPHONE ENCOUNTER
Routing refill request to provider for review/approval because:  Medication is reported/historical  Leigh Ann Hector RN

## 2018-06-07 ENCOUNTER — THERAPY VISIT (OUTPATIENT)
Dept: PHYSICAL THERAPY | Facility: CLINIC | Age: 58
End: 2018-06-07
Payer: COMMERCIAL

## 2018-06-07 DIAGNOSIS — M54.42 ACUTE BILATERAL LOW BACK PAIN WITH LEFT-SIDED SCIATICA: ICD-10-CM

## 2018-06-07 PROCEDURE — 97530 THERAPEUTIC ACTIVITIES: CPT | Mod: GP | Performed by: PHYSICAL THERAPIST

## 2018-06-07 PROCEDURE — 97110 THERAPEUTIC EXERCISES: CPT | Mod: GP | Performed by: PHYSICAL THERAPIST

## 2018-06-07 NOTE — MR AVS SNAPSHOT
After Visit Summary   6/7/2018    Gael Le    MRN: [id number]           Patient Information     Date Of Birth          1960        Visit Information        Provider Department      6/7/2018 4:20 PM Billy Ratliff, PT [facility]        Today's Diagnoses     Acute bilateral low back pain with left-sided sciatica           Follow-ups after your visit        Your next 10 appointments already scheduled     Jun 14, 2018  5:00 PM CDT   [facility] Spine Follow Up with Billy Ratliff PT   [facility] ([facility])    [address]  [facility] [address] [phone]           If you have your physician's order in hand, please bring it with you to your appointment              Who to contact     If you have questions or need follow up information about today's clinic visit or your schedule please contact [facility] [place] directly at [phone].  Normal or non-critical lab and imaging results will be communicated to you by [employer], letter or phone within 4 business days after the clinic has received the results. If you do not hear from us within 7 days, please contact the clinic through [employer] or phone. If you have a critical or abnormal lab result, we will notify you by phone as soon as possible.  Submit refill requests through [employer] or call your pharmacy and they will forward the refill request to us. Please allow 3 business days for your refill to be completed.          Additional Information About Your Visit        [employer] Information     [employer] gives you secure access to your electronic health record. If you see a primary care provider, you can also send messages to your care team and make appointments. If you have questions, please call your primary care clinic.  If you do not have a primary care  provider, please call [phone] and they will assist you.        Care EveryWhere ID     This is your Care EveryWhere ID. This could be used by other organizations to access your [facility] medical records  [id number]         Blood Pressure from Last 3 Encounters:   05/17/18 128/82   05/02/18 128/80   09/07/17 118/84    Weight from Last 3 Encounters:   05/17/18 78.9 kg (174 lb)   05/02/18 80.4 kg (177 lb 4.8 oz)   09/07/17 78.5 kg (173 lb)              We Performed the Following     THERAPEUTIC ACTIVITIES     THERAPEUTIC EXERCISES        Primary Care Provider Office Phone # Fax #    Idania Marroquin [phone] [address]  [facility] [address]        Equal Access to Services     APRIL SILVA : Hadii dieudonne alex hadasho Soomaali, waaxda luqadaha, qaybta kaalmada adeegyada, saeed cornejo . So [facility] [phone].    ATENCIÓN: Si habla español, tiene a fritz disposición servicios gratuitos de asistencia lingüística. Divine al [phone].    We comply with applicable federal civil rights laws and [place] laws. We do not discriminate on the basis of race, color, national origin, age, disability, sex, sexual orientation, or gender identity.            Thank you!     Thank you for choosing [facility]  for your care. Our goal is always to provide you with excellent care. Hearing back from our patients is one way we can continue to improve our services. Please take a few minutes to complete the written survey that you may receive in the mail after your visit with us. Thank you!             Your Updated Medication List - Protect others around you: Learn how to safely use, store and throw away your medicines at www.disposemymeds.org.          This list is accurate as of 6/7/18  4:47 PM.  Always use your most recent med list.                   Brand Name Dispense Instructions for use Diagnosis    ALPRAZolam 0.5 MG tablet     XANAX    30 tablet    TAKE 1 TABLET BY MOUTH THREE TIMES DAILY AS NEEDED FOR ANXIETY    Anxiety state       atorvastatin 20 MG tablet    LIPITOR    90 tablet    Take 1 tablet (20 mg) by mouth daily    Mixed hyperlipidemia       FISH OIL           gabapentin 100 MG capsule    NEURONTIN    30 capsule    Take 1 capsule (100 mg) by mouth nightly as needed Restless legs or anxiety    Restless legs syndrome (RLS), Anxiety as acute reaction to exceptional stress       MULTI-VITAMIN DAILY PO      Take  by mouth.        tadalafil 5 MG tablet    CIALIS    30 tablet    Hand written script for 7 mg capsules to different pharmacy for patient.  Do not use with nitroglycerin, terazosin or doxazosin.    Erectile dysfunction, unspecified erectile dysfunction type       tamsulosin 0.4 MG capsule    FLOMAX    30 capsule    Take 1 capsule (0.4 mg) by mouth daily    Benign prostatic hyperplasia with urinary hesitancy

## 2018-06-07 NOTE — PROGRESS NOTES
Subjective:  HPI                    Objective:  System    Physical Exam    General     ROS    Assessment/Plan:    SUBJECTIVE  Subjective changes as noted by pt:  Performing the pressups 1/x/day since first session two weeks ago. Hasn't been feeling much discomfort until today with some mild L>R LBP. Sat cross-legged on the floor last night for an hour.      Current pain level: 1/10     Changes in function:  None     Adverse reaction to treatment or activity:  None    OBJECTIVE  Changes in objective findings:  Yes, See physical exam section and/or daily flowsheet for response to repeated movements.           ASSESSMENT  Gael continues to require intervention to meet STG and LTG's: PT  No change of symptoms has been noted.  Response to therapy has shown lack of progress in  pain level  Progress made towards STG/LTG?  None    PLAN  Continue current treatment plan until patient demonstrates readiness to progress to higher level exercises.    PTA/ATC plan:  N/A    Please refer to the daily flowsheet for treatment today, total treatment time and time spent performing 1:1 timed codes.

## 2018-06-12 ENCOUNTER — MYC MEDICAL ADVICE (OUTPATIENT)
Dept: FAMILY MEDICINE | Facility: CLINIC | Age: 58
End: 2018-06-12

## 2018-06-14 ENCOUNTER — THERAPY VISIT (OUTPATIENT)
Dept: PHYSICAL THERAPY | Facility: CLINIC | Age: 58
End: 2018-06-14
Payer: COMMERCIAL

## 2018-06-14 DIAGNOSIS — M54.42 ACUTE BILATERAL LOW BACK PAIN WITH LEFT-SIDED SCIATICA: ICD-10-CM

## 2018-06-14 PROCEDURE — 97110 THERAPEUTIC EXERCISES: CPT | Mod: GP | Performed by: PHYSICAL THERAPIST

## 2018-06-14 PROCEDURE — 97530 THERAPEUTIC ACTIVITIES: CPT | Mod: GP | Performed by: PHYSICAL THERAPIST

## 2018-06-14 NOTE — MR AVS SNAPSHOT
After Visit Summary   6/14/2018    Gael Le    MRN: [id number]           Patient Information     Date Of Birth          1960        Visit Information        Provider Department      6/14/2018 5:00 PM Billy Ratliff, PT [facility] [place] [facility] Physical Therapy        Today's Diagnoses     Acute bilateral low back pain with left-sided sciatica           Follow-ups after your visit        Your next 10 appointments already scheduled     Jul 05, 2018  5:00 PM CDT   [facility] Physical Therapy Spine Follow Up with Billy Ratliff PT   [facility] [place] [facility] Physical Therapy ([facility])    [address]  [facility] [address] [phone]           If you have your physician's order in hand, please bring it with you to your appointment              Who to contact     If you have questions or need follow up information about today's clinic visit or your schedule please contact [facility] directly at [phone].  Normal or non-critical lab and imaging results will be communicated to you by SIM Digitalhart, letter or phone within 4 business days after the clinic has received the results. If you do not hear from us within 7 days, please contact the clinic through [employer] or phone. If you have a critical or abnormal lab result, we will notify you by phone as soon as possible.  Submit refill requests through [employer] or call your pharmacy and they will forward the refill request to us. Please allow 3 business days for your refill to be completed.          Additional Information About Your Visit        SIM Digitalhart Information     [employer] gives you secure access to your electronic health record. If you see a primary care provider, you can also send messages to your care team and make appointments. If you have questions, please call your primary care clinic.  If you do not have a primary care  provider, please call [phone] and they will assist you.        Care EveryWhere ID     This is your Care EveryWhere ID. This could be used by other organizations to access your [place] medical records  [id number]         Blood Pressure from Last 3 Encounters:   05/17/18 128/82   05/02/18 128/80   09/07/17 118/84    Weight from Last 3 Encounters:   05/17/18 78.9 kg (174 lb)   05/02/18 80.4 kg (177 lb 4.8 oz)   09/07/17 78.5 kg (173 lb)              We Performed the Following     THERAPEUTIC ACTIVITIES     THERAPEUTIC EXERCISES        Primary Care Provider Office Phone # Fax #    Idania Marroquin [phone] [address]  [facility] [facility] [address]        Equal Access to Services     APRIL SILVA : Hadii dieudonne alex hadasho Soomaali, waaxda luqadaha, qaybta kaalmada adeegyada, saeed cornejo . So [facility] [phone].    ATENCIÓN: Si habla español, tiene a fritz disposición servicios gratuitos de asistencia lingüística. Divine al [phone].    We comply with applicable federal civil rights laws and [place] laws. We do not discriminate on the basis of race, color, national origin, age, disability, sex, sexual orientation, or gender identity.            Thank you!     Thank you for choosing [facility] PHYS[facility] THERAPY  for your care. Our goal is always to provide you with excellent care. Hearing back from our patients is one way we can continue to improve our services. Please take a few minutes to complete the written survey that you may receive in the mail after your visit with us. Thank you!             Your Updated Medication List - Protect others around you: Learn how to safely use, store and throw away your medicines at www.disposemymeds.org.          This list is accurate as of 6/14/18  5:28 PM.  Always use your most recent med list.                   Brand Name Dispense Instructions for use Diagnosis    ALPRAZolam 0.5 MG tablet     XANAX    30 tablet    TAKE 1 TABLET BY MOUTH THREE TIMES DAILY AS NEEDED FOR ANXIETY    Anxiety state       atorvastatin 20 MG tablet    LIPITOR    90 tablet    Take 1 tablet (20 mg) by mouth daily    Mixed hyperlipidemia       FISH OIL           gabapentin 100 MG capsule    NEURONTIN    30 capsule    Take 1 capsule (100 mg) by mouth nightly as needed Restless legs or anxiety    Restless legs syndrome (RLS), Anxiety as acute reaction to exceptional stress       MULTI-VITAMIN DAILY PO      Take  by mouth.        tadalafil 5 MG tablet    CIALIS    30 tablet    Hand written script for 7 mg capsules to different pharmacy for patient.  Do not use with nitroglycerin, terazosin or doxazosin.    Erectile dysfunction, unspecified erectile dysfunction type       tamsulosin 0.4 MG capsule    FLOMAX    30 capsule    Take 1 capsule (0.4 mg) by mouth daily    Benign prostatic hyperplasia with urinary hesitancy

## 2018-06-14 NOTE — PROGRESS NOTES
Subjective:  HPI                    Objective:  System    Physical Exam    General     ROS    Assessment/Plan:    SUBJECTIVE  Subjective changes as noted by pt:  Performing the pressups 3x/day. No pain today, notices symptoms when carrying grandson. He then can perform his pressup and the pain goes away. Overall better since starting PT.     Current pain level: 0/10     Changes in function:  None     Adverse reaction to treatment or activity:  None    OBJECTIVE  Changes in objective findings:  Yes,  See physical exam section and/or daily flowsheet for response to repeated movements.           ASSESSMENT  Gael continues to require intervention to meet STG and LTG's: PT  Patient is progressing as expected.  Response to therapy has shown an improvement in  pain level  Progress made towards STG/LTG?  None    PLAN  Continue current treatment plan until patient demonstrates readiness to progress to higher level exercises.    PTA/ATC plan:  N/A    Please refer to the daily flowsheet for treatment today, total treatment time and time spent performing 1:1 timed codes.

## 2018-06-20 ENCOUNTER — MYC MEDICAL ADVICE (OUTPATIENT)
Dept: FAMILY MEDICINE | Facility: CLINIC | Age: 58
End: 2018-06-20

## 2018-06-20 DIAGNOSIS — N52.9 ERECTILE DYSFUNCTION, UNSPECIFIED ERECTILE DYSFUNCTION TYPE: ICD-10-CM

## 2018-06-20 NOTE — TELEPHONE ENCOUNTER
"Requested Prescriptions   Pending Prescriptions Disp Refills     tadalafil (CIALIS) 5 MG tablet 30 tablet 3     Sig: Hand written script for 7 mg capsules to different pharmacy for patient.  Do not use with nitroglycerin, terazosin or doxazosin.    Erectile Dysfuction Protocol Failed    6/20/2018  1:51 PM       Failed - Absence of Alpha Blockers on Med list       Passed - Absence of nitrates on medication list       Passed - Recent (12 mo) or future (30 days) visit within the authorizing provider's specialty    Patient had office visit in the last 12 months or has a visit in the next 30 days with authorizing provider or within the authorizing provider's specialty.  See \"Patient Info\" tab in inbasket, or \"Choose Columns\" in Meds & Orders section of the refill encounter.           Passed - Patient is age 18 or older        tadalafil (CIALIS) 5 MG tablet  Last Written Prescription Date:  12/4/17  Last Fill Quantity: 30,  # refills: 3   Last office visit: 5/17/2018 with prescribing provider:  Dr. Marroquin   Future Office Visit:      "

## 2018-06-21 ENCOUNTER — MEDICAL CORRESPONDENCE (OUTPATIENT)
Dept: HEALTH INFORMATION MANAGEMENT | Facility: CLINIC | Age: 58
End: 2018-06-21

## 2018-06-22 RX ORDER — TADALAFIL 5 MG/1
TABLET ORAL
Qty: 30 TABLET | Refills: 3 | OUTPATIENT
Start: 2018-06-22

## 2018-06-22 NOTE — TELEPHONE ENCOUNTER
Routing refill request to provider for review/approval because:  Drug interaction warning    Gissel Jackson RN BSN  [facility]  [phone]

## 2018-07-02 ENCOUNTER — TRANSFERRED RECORDS (OUTPATIENT)
Dept: HEALTH INFORMATION MANAGEMENT | Facility: CLINIC | Age: 58
End: 2018-07-02

## 2018-07-16 ENCOUNTER — ALLIED HEALTH/NURSE VISIT (OUTPATIENT)
Dept: NURSING | Facility: CLINIC | Age: 58
End: 2018-07-16
Payer: COMMERCIAL

## 2018-07-16 DIAGNOSIS — Z23 NEED FOR SHINGLES VACCINE: Primary | ICD-10-CM

## 2018-07-16 PROCEDURE — 99207 ZZC NO CHARGE NURSE ONLY: CPT

## 2018-07-16 PROCEDURE — 90750 HZV VACC RECOMBINANT IM: CPT

## 2018-07-16 PROCEDURE — 90471 IMMUNIZATION ADMIN: CPT

## 2018-07-16 NOTE — MR AVS SNAPSHOT
After Visit Summary   7/16/2018    Gael Le    MRN: [id number]           Patient Information     Date Of Birth          1960        Visit Information        Provider Department      7/16/2018 9:00 AM [facility]        Today's Diagnoses     Need for shingles vaccine    -  1       Follow-ups after your visit        Who to contact     If you have questions or need follow up information about today's clinic visit or your schedule please contact [facility] directly at [phone].  Normal or non-critical lab and imaging results will be communicated to you by MyChart, letter or phone within 4 business days after the clinic has received the results. If you do not hear from us within 7 days, please contact the clinic through Giveterhart or phone. If you have a critical or abnormal lab result, we will notify you by phone as soon as possible.  Submit refill requests through [email or web address] or call your pharmacy and they will forward the refill request to us. Please allow 3 business days for your refill to be completed.          Additional Information About Your Visit        MyChart Information     [email or web address] gives you secure access to your electronic health record. If you see a primary care provider, you can also send messages to your care team and make appointments. If you have questions, please call your primary care clinic.  If you do not have a primary care provider, please call [phone] and they will assist you.        Care EveryWhere ID     This is your Care EveryWhere ID. This could be used by other organizations to access your [place] medical records  [id number]         Blood Pressure from Last 3 Encounters:   05/17/18 128/82   05/02/18 128/80   09/07/17 118/84    Weight from Last 3 Encounters:   05/17/18 78.9 kg (174 lb)   05/02/18 80.4 kg (177 lb 4.8 oz)   09/07/17 78.5 kg (173 lb)              We Performed the Following     VACCINE ADMINISTRATION,  INITIAL     ZOSTER VACCINE RECOMBINANT ADJUVANTED IM NJX        Primary Care Provider Office Phone # Fax #    Idania Marroquin [phone] [address]  [facility] [address]        Equal Access to Services     APRIL SILVA : Hadii aad ku hadjavono Soomaali, waaxda luqadaha, qaybta kaalmada adeegyada, waxthee perezn adesouleymane keith chantal riggs. So [facility] [phone].    ATENCIÓN: Si habla español, tiene a fritz disposición servicios gratuitos de asistencia lingüística. Llame al [phone].    We comply with applicable federal civil rights laws and [place] laws. We do not discriminate on the basis of race, color, national origin, age, disability, sex, sexual orientation, or gender identity.            Thank you!     Thank you for choosing [facility]  for your care. Our goal is always to provide you with excellent care. Hearing back from our patients is one way we can continue to improve our services. Please take a few minutes to complete the written survey that you may receive in the mail after your visit with us. Thank you!             Your Updated Medication List - Protect others around you: Learn how to safely use, store and throw away your medicines at www.disposemymeds.org.          This list is accurate as of 7/16/18  9:14 AM.  Always use your most recent med list.                   Brand Name Dispense Instructions for use Diagnosis    ALPRAZolam 0.5 MG tablet    XANAX    30 tablet    TAKE 1 TABLET BY MOUTH THREE TIMES DAILY AS NEEDED FOR ANXIETY    Anxiety state       atorvastatin 20 MG tablet    LIPITOR    90 tablet    Take 1 tablet (20 mg) by mouth daily    Mixed hyperlipidemia       FISH OIL           gabapentin 100 MG capsule    NEURONTIN    30 capsule    Take 1 capsule (100 mg) by mouth nightly as needed Restless legs or anxiety    Restless legs syndrome (RLS), Anxiety as acute reaction to exceptional stress       MULTI-VITAMIN DAILY PO      Take  by mouth.         tadalafil 5 MG tablet    CIALIS    30 tablet    Hand written script for 7 mg capsules to different pharmacy for patient.  Do not use with nitroglycerin, terazosin or doxazosin.    Erectile dysfunction, unspecified erectile dysfunction type       tamsulosin 0.4 MG capsule    FLOMAX    30 capsule    Take 1 capsule (0.4 mg) by mouth daily    Benign prostatic hyperplasia with urinary hesitancy

## 2018-07-16 NOTE — NURSING NOTE
Screening Questionnaire for Adult Immunization    Are you sick today?   No   Do you have allergies to medications, food, a vaccine component or latex?   Yes   Have you ever had a serious reaction after receiving a vaccination?   No   Do you have a long-term health problem with heart disease, lung disease, asthma, kidney disease, metabolic disease (e.g. diabetes), anemia, or other blood disorder?   No   Do you have cancer, leukemia, HIV/AIDS, or any other immune system problem?   No   In the past 3 months, have you taken medications that affect  your immune system, such as prednisone, other steroids, or anticancer drugs; drugs for the treatment of rheumatoid arthritis, Crohn s disease, or psoriasis; or have you had radiation treatments?   No   Have you had a seizure, or a brain or other nervous system problem?   No   During the past year, have you received a transfusion of blood or blood     products, or been given immune (gamma) globulin or antiviral drug?   No   For women: Are you pregnant or is there a chance you could become        pregnant during the next month?   No   Have you received any vaccinations in the past 4 weeks?   No           Patient instructed to remain in clinic for 15 minutes afterwards, and to report any adverse reaction to me immediately.       Screening performed by Jerson Luna on 7/16/2018 at 9:12 AM.

## 2018-07-19 ENCOUNTER — E-VISIT (OUTPATIENT)
Dept: FAMILY MEDICINE | Facility: CLINIC | Age: 58
End: 2018-07-19
Payer: COMMERCIAL

## 2018-07-19 DIAGNOSIS — K12.0 APHTHOUS ULCER OF MOUTH: Primary | ICD-10-CM

## 2018-07-19 PROCEDURE — 99444 ZZC PHYSICIAN ONLINE EVALUATION & MANAGEMENT SERVICE: CPT | Performed by: FAMILY MEDICINE

## 2018-07-19 NOTE — MR AVS SNAPSHOT
After Visit Summary   7/19/2018    Gael Le    MRN: [id number]           Patient Information     Date Of Birth          1960        Visit Information        Provider Department      7/19/2018 6:29 AM Idania Marroquin MD [facility]        Today's Diagnoses     Aphthous ulcer of mouth    -  1      Care Instructions    Salt water swish and spit for mouth - 3-4 times a day.  Lidocaine swish and spit every 3 hours as needed for pain.    Follow up for increase in swelling or drainage for site of sores.            Follow-ups after your visit        Your next 10 appointments already scheduled     Sep 17, 2018  9:00 AM CDT   Nurse Only with [facility]   [facility] ([facility])    [address] [address] [phone]              Who to contact     If you have questions or need follow up information about today's clinic visit or your schedule please contact [facility] directly at [phone].  Normal or non-critical lab and imaging results will be communicated to you by [email or web address], letter or phone within 4 business days after the clinic has received the results. If you do not hear from us within 7 days, please contact the clinic through [employer] or phone. If you have a critical or abnormal lab result, we will notify you by phone as soon as possible.  Submit refill requests through [employer] or call your pharmacy and they will forward the refill request to us. Please allow 3 business days for your refill to be completed.          Additional Information About Your Visit        [email or web address] Information     [employer] gives you secure access to your electronic health record. If you see a primary care provider, you can also send messages to your care team and make appointments. If you have questions, please call your primary care clinic.  If you do not have a primary care provider, please call [phone] and they will  assist you.        Care EveryWhere ID     This is your Care EveryWhere ID. This could be used by other organizations to access your [place] medical records  [id number]         Blood Pressure from Last 3 Encounters:   05/17/18 128/82   05/02/18 128/80   09/07/17 118/84    Weight from Last 3 Encounters:   05/17/18 78.9 kg (174 lb)   05/02/18 80.4 kg (177 lb 4.8 oz)   09/07/17 78.5 kg (173 lb)              Today, you had the following     No orders found for display         Today's Medication Changes          These changes are accurate as of 7/19/18  6:47 AM.  If you have any questions, ask your nurse or doctor.               Start taking these medicines.        Dose/Directions    lidocaine (viscous) 2 % solution   Commonly known as:  XYLOCAINE   Used for:  Aphthous ulcer of mouth        Dose:  15 mL   Take 15 mLs by mouth every 3 hours as needed for moderate pain swish and spit every 3-8 hours as needed; max 8 doses/24 hour period   Quantity:  100 mL   Refills:  0            Where to get your medicines      These medications were sent to [facility] - [place], [place] - [address]  [address], [facility] [address]     Phone:  [phone]     lidocaine (viscous) 2 % solution                Primary Care Provider Office Phone # Fax #    Idania Marroquin [phone] [address]  [facility] [address]        Equal Access to Services     KAEL SILVA AH: Hadii dieudonne ku hadasho Soomaali, waaxda luqadaha, qaybta kaalmada adeegyada, waxthee abby riggs. So [facility] [phone].    ATENCIÓN: Si habla español, tiene a fritz disposición servicios gratuitos de asistencia lingüística. Llame al [phone].    We comply with applicable federal civil rights laws and [place] laws. We do not discriminate on the basis of race, color, national origin, age, disability, sex, sexual orientation, or gender identity.            Thank you!     Thank you for  choosing [facility]  for your care. Our goal is always to provide you with excellent care. Hearing back from our patients is one way we can continue to improve our services. Please take a few minutes to complete the written survey that you may receive in the mail after your visit with us. Thank you!             Your Updated Medication List - Protect others around you: Learn how to safely use, store and throw away your medicines at www.disposemymeds.org.          This list is accurate as of 7/19/18  6:47 AM.  Always use your most recent med list.                   Brand Name Dispense Instructions for use Diagnosis    ALPRAZolam 0.5 MG tablet    XANAX    30 tablet    TAKE 1 TABLET BY MOUTH THREE TIMES DAILY AS NEEDED FOR ANXIETY    Anxiety state       atorvastatin 20 MG tablet    LIPITOR    90 tablet    Take 1 tablet (20 mg) by mouth daily    Mixed hyperlipidemia       FISH OIL           gabapentin 100 MG capsule    NEURONTIN    30 capsule    Take 1 capsule (100 mg) by mouth nightly as needed Restless legs or anxiety    Restless legs syndrome (RLS), Anxiety as acute reaction to exceptional stress       lidocaine (viscous) 2 % solution    XYLOCAINE    100 mL    Take 15 mLs by mouth every 3 hours as needed for moderate pain swish and spit every 3-8 hours as needed; max 8 doses/24 hour period    Aphthous ulcer of mouth       MULTI-VITAMIN DAILY PO      Take  by mouth.        tadalafil 5 MG tablet    CIALIS    30 tablet    Hand written script for 7 mg capsules to different pharmacy for patient.  Do not use with nitroglycerin, terazosin or doxazosin.    Erectile dysfunction, unspecified erectile dysfunction type       tamsulosin 0.4 MG capsule    FLOMAX    30 capsule    Take 1 capsule (0.4 mg) by mouth daily    Benign prostatic hyperplasia with urinary hesitancy

## 2018-07-19 NOTE — PATIENT INSTRUCTIONS
Salt water swish and spit for mouth - 3-4 times a day.  Lidocaine swish and spit every 3 hours as needed for pain.    Follow up for increase in swelling or drainage for site of sores.

## 2018-09-28 ENCOUNTER — RADIANT APPOINTMENT (OUTPATIENT)
Dept: GENERAL RADIOLOGY | Facility: CLINIC | Age: 58
End: 2018-09-28
Attending: PHYSICIAN ASSISTANT
Payer: COMMERCIAL

## 2018-09-28 ENCOUNTER — OFFICE VISIT (OUTPATIENT)
Dept: FAMILY MEDICINE | Facility: CLINIC | Age: 58
End: 2018-09-28
Payer: COMMERCIAL

## 2018-09-28 VITALS
OXYGEN SATURATION: 98 % | WEIGHT: 175 LBS | SYSTOLIC BLOOD PRESSURE: 152 MMHG | TEMPERATURE: 98.4 F | HEART RATE: 117 BPM | BODY MASS INDEX: 25.47 KG/M2 | RESPIRATION RATE: 18 BRPM | DIASTOLIC BLOOD PRESSURE: 80 MMHG

## 2018-09-28 DIAGNOSIS — M25.521 RIGHT ELBOW PAIN: ICD-10-CM

## 2018-09-28 DIAGNOSIS — M25.521 RIGHT ELBOW PAIN: Primary | ICD-10-CM

## 2018-09-28 PROCEDURE — 99213 OFFICE O/P EST LOW 20 MIN: CPT | Performed by: PHYSICIAN ASSISTANT

## 2018-09-28 PROCEDURE — 73070 X-RAY EXAM OF ELBOW: CPT | Mod: RT | Performed by: FAMILY MEDICINE

## 2018-09-28 NOTE — MR AVS SNAPSHOT
After Visit Summary   9/28/2018    Gael Le    MRN: [id number]           Patient Information     Date Of Birth          1960        Visit Information        Provider Department      9/28/2018 1:40 PM Kayley Carlson PA-C [facility]        Today's Diagnoses     Right elbow pain    -  1      Care Instructions    Try ibuprofen up to 3 over the counter tablets four times a day over the next week.   Follow up with orthopedics at [facility] ([phone]) if not improving over the next 2 weeks.   Return urgently if any change in symptoms like increasing pain, numbness, weakness or other change in symptoms.             Follow-ups after your visit        Additional Services     ORTHOPEDICS ADULT REFERRAL       Your provider has referred you to: [facility] [phone]   [email or web address]    Please be aware that coverage of these services is subject to the terms and limitations of your health insurance plan.  Call member services at your health plan with any benefit or coverage questions.      Please bring the following to your appointment:    >>   Any x-rays, CTs or MRIs which have been performed.  Contact the facility where they were done to arrange for  prior to your scheduled appointment.    >>   List of current medications   >>   This referral request   >>   Any documents/labs given to you for this referral                  Follow-up notes from your care team     Return in about 2 weeks (around 10/12/2018), or if symptoms worsen or fail to improve.      Your next 10 appointments already scheduled     Oct 11, 2018  8:40 AM CDT   Nurse Only with BA ANCILLARY   [facility]    [address] [address] [phone]               Who to contact     If you have questions or need follow up information about today's clinic visit or your schedule please contact [facility] directly at [phone].  Normal or non-critical lab and imaging results will be communicated to you by MyChart, letter or phone within 4 business days after the clinic has received the results. If you do not hear from us within 7 days, please contact the clinic through MyChart or phone. If you have a critical or abnormal lab result, we will notify you by phone as soon as possible.  Submit refill requests through [employer] or call your pharmacy and they will forward the refill request to us. Please allow 3 business days for your refill to be completed.          Additional Information About Your Visit        [employer] Information     [employer] gives you secure access to your electronic health record. If you see a primary care provider, you can also send messages to your care team and make appointments. If you have questions, please call your primary care clinic.  If you do not have a primary care provider, please call [phone] and they will assist you.        Care EveryWhere ID     This is your Care EveryWhere ID. This could be used by other organizations to access your [facility] medical records  [id number]        Your Vitals Were     Pulse Temperature Respirations Pulse Oximetry BMI (Body Mass Index)       117 98.4  F (36.9  C) (Oral) 18 98% 25.47 kg/m2        Blood Pressure from Last 3 Encounters:   09/28/18 152/80   05/17/18 128/82   05/02/18 128/80    Weight from Last 3 Encounters:   09/28/18 79.4 kg (175 lb)   05/17/18 78.9 kg (174 lb)   05/02/18 80.4 kg (177 lb 4.8 oz)              We Performed the Following     ORTHOPEDICS ADULT REFERRAL        Primary Care Provider Office Phone # Fax #    Idania Marroquin [phone] [address]  [facility] [address]        Equal Access to Services     APRIL SILVA : Soo morales  Fernando, addida luqadaha, qadaliata kacarlos eduardo machado, saeed lavernein hayaan nilamsouleymane donelljuju laLolabrittney oneil. So [facility] [phone].    ATENCIÓN: Si kat oliva, tiene a fritz disposición servicios gratuitos de asistencia lingüística. Divine al [phone].    We comply with applicable federal civil rights laws and [place] laws. We do not discriminate on the basis of race, color, national origin, age, disability, sex, sexual orientation, or gender identity.            Thank you!     Thank you for choosing [facility]  for your care. Our goal is always to provide you with excellent care. Hearing back from our patients is one way we can continue to improve our services. Please take a few minutes to complete the written survey that you may receive in the mail after your visit with us. Thank you!             Your Updated Medication List - Protect others around you: Learn how to safely use, store and throw away your medicines at www.disposemymeds.org.          This list is accurate as of 9/28/18  2:02 PM.  Always use your most recent med list.                   Brand Name Dispense Instructions for use Diagnosis    ALPRAZolam 0.5 MG tablet    XANAX    30 tablet    TAKE 1 TABLET BY MOUTH THREE TIMES DAILY AS NEEDED FOR ANXIETY    Anxiety state       atorvastatin 20 MG tablet    LIPITOR    90 tablet    Take 1 tablet (20 mg) by mouth daily    Mixed hyperlipidemia       FISH OIL           MULTI-VITAMIN DAILY PO      Take  by mouth.        tadalafil 5 MG tablet    CIALIS    30 tablet    Hand written script for 7 mg capsules to different pharmacy for patient.  Do not use with nitroglycerin, terazosin or doxazosin.    Erectile dysfunction, unspecified erectile dysfunction type       tamsulosin 0.4 MG capsule    FLOMAX    30 capsule    Take 1 capsule (0.4 mg) by mouth daily    Benign prostatic hyperplasia with urinary hesitancy

## 2018-09-28 NOTE — PROGRESS NOTES
SUBJECTIVE:   Gael Le is a 58 year old male who presents to clinic today for the following health issues:      Joint Pain    Onset: 1 month and half now     Description:   Location: right elbow  Character: Sharp pain with pressure or if extending elbow     Intensity: mild    Progression of Symptoms: same    Accompanying Signs & Symptoms:  Other symptoms: radiation of pain to down right arm and numbness when bike riding     History:   Previous similar pain: YES      Precipitating factors:   Trauma or overuse: YES- possibility     Alleviating factors:  Improved by: rest/inactivity    Therapies Tried and outcome: None       Right elbow pain with pressure applied .   If rests elbow on table or desk will notice pain.  No pain with range of motion.  No history of trauma or injury    With Bike riding right arm will go numb and shake and recovers.    does admit he has some anxiety and concerned    for [employer] sits at desk.  Right hand dominant    Problem list and histories reviewed & adjusted, as indicated.  Additional history: as documented    Patient Active Problem List   Diagnosis     Rosacea     Anxiety state     Hyperlipidemia     Migraine     Supraventricular premature beats     Erectile dysfunction, unspecified erectile dysfunction type     Nontoxic multinodular goiter     Tinnitus     Advance Care Planning     Hearing deficit, bilateral     Acute bilateral low back pain with left-sided sciatica     Past Surgical History:   Procedure Laterality Date     COLONOSCOPY WITH CO2 INSUFFLATION N/A 11/21/2016    Procedure: COLONOSCOPY WITH CO2 INSUFFLATION;  Surgeon: Adeel Graf MD;  Location: [facility] OR     FINGER SURGERY       HERNIA REPAIR         Social History   Substance Use Topics     Smoking status: Never Smoker     Smokeless tobacco: Never Used     Alcohol use Yes     Family History   Problem Relation Age of Onset     Dementia Mother      multiinfarct dementia     Diabetes Father       Cerebrovascular Disease Father      Skin Cancer Father      BCC     Myocardial Infarction Paternal Grandmother      Colon Cancer No family hx of      Prostate Cancer No family hx of      Asthma No family hx of          Current Outpatient Prescriptions   Medication Sig Dispense Refill     ALPRAZolam (XANAX) 0.5 MG tablet TAKE 1 TABLET BY MOUTH THREE TIMES DAILY AS NEEDED FOR ANXIETY 30 tablet 0     atorvastatin (LIPITOR) 20 MG tablet Take 1 tablet (20 mg) by mouth daily 90 tablet 3     FISH OIL        Multiple Vitamin (MULTI-VITAMIN DAILY PO) Take  by mouth.       tamsulosin (FLOMAX) 0.4 MG capsule Take 1 capsule (0.4 mg) by mouth daily 30 capsule 1     tadalafil (CIALIS) 5 MG tablet Hand written script for 7 mg capsules to different pharmacy for patient.  Do not use with nitroglycerin, terazosin or doxazosin. 30 tablet 3       Reviewed and updated as needed this visit by clinical staff  Tobacco  Allergies  Meds  Med Hx  Surg Hx  Fam Hx  Soc Hx      Reviewed and updated as needed this visit by Provider  Tobacco  Allergies  Meds  Problems  Med Hx  Surg Hx  Fam Hx  Soc Hx          ROS:  Constitutional, HEENT, cardiovascular, pulmonary, gi and gu systems are negative, except as otherwise noted.    OBJECTIVE:     /80 (BP Location: Right arm, Patient Position: Sitting, Cuff Size: Adult Regular)  Pulse 117  Temp 98.4  F (36.9  C) (Oral)  Resp 18  Wt 79.4 kg (175 lb)  SpO2 98%  BMI 25.47 kg/m2  Body mass index is 25.47 kg/(m^2).  GENERAL: healthy, alert and no distress  NECK: no adenopathy, no asymmetry, masses, or scars and thyroid normal to palpation  RESP: lungs clear to auscultation - no rales, rhonchi or wheezes  CV: regular rate and rhythm, normal S1 S2, no S3 or S4, no murmur, click or rub, no peripheral edema and peripheral pulses strong  MS: right elbow without effusion.  Normal range of motion.  No tenderness to palpation except in one very small pinpoint area just above olecranon. No  tenderness of lateral epicondyle .  No erythema.  No pain with range of motion.      Diagnostic Test Results:  Xray - right elbow appears normal.     ASSESSMENT/PLAN:             1. Right elbow pain  Area of point tenderness is very small . Normal xray and normal range of motion.  Do not think gout or tennis elbow or anything concerning. Recommended nsaid and follow up with ortho if no improvement and consider steroid injection  Did not notice blood pressure until after visit and sent [employer] message to schedule [place] visit to recheck and follow up if remains elevated.     - XR Elbow Right 2 Views; Future  - ORTHOPEDICS ADULT REFERRAL    CC chart to PCP for vikram Carlson PA-C  [facility]

## 2018-09-28 NOTE — PATIENT INSTRUCTIONS
Try ibuprofen up to 3 over the counter tablets four times a day over the next week.   Follow up with orthopedics at [facility] ([phone]) if not improving over the next 2 weeks.   Return urgently if any change in symptoms like increasing pain, numbness, weakness or other change in symptoms.

## 2018-09-29 NOTE — PROGRESS NOTES
Daniela Mayo   Your xray was read by the radiologist as normal as well.   Please call or MyChart my office with any questions or concerns.    Kayley Carlson, PAC

## 2018-10-22 ENCOUNTER — MYC MEDICAL ADVICE (OUTPATIENT)
Dept: FAMILY MEDICINE | Facility: CLINIC | Age: 58
End: 2018-10-22

## 2018-10-22 DIAGNOSIS — I10 BENIGN ESSENTIAL HYPERTENSION: Primary | ICD-10-CM

## 2018-10-22 RX ORDER — LOSARTAN POTASSIUM 25 MG/1
25 TABLET ORAL DAILY
Qty: 90 TABLET | Refills: 0 | Status: SHIPPED | OUTPATIENT
Start: 2018-10-22 | End: 2019-02-18

## 2018-10-24 ENCOUNTER — OFFICE VISIT (OUTPATIENT)
Dept: AUDIOLOGY | Facility: CLINIC | Age: 58
End: 2018-10-24
Payer: COMMERCIAL

## 2018-10-24 DIAGNOSIS — H90.3 SENSORINEURAL HEARING LOSS (SNHL) OF BOTH EARS: Primary | ICD-10-CM

## 2018-10-24 PROCEDURE — 92550 TYMPANOMETRY & REFLEX THRESH: CPT | Performed by: AUDIOLOGIST

## 2018-10-24 PROCEDURE — 92557 COMPREHENSIVE HEARING TEST: CPT | Performed by: AUDIOLOGIST

## 2018-10-24 PROCEDURE — 92591 HC HEARING AID EXAM BINAURAL: CPT | Performed by: AUDIOLOGIST

## 2018-10-24 NOTE — PROGRESS NOTES
AUDIOLOGY REPORT    SUBJECTIVE:  Gael Le is a 58 year old male who was seen in the Audiology Clinic at the [facility]  for audiologic evaluation, referred by Lavelle Monsivais. The patient has been seen previously in this clinic on 4/10/2018 for assessment and results indicated a mild to moderate sensorineural hearing loss bilaterally. THe patient is long-term user of hearing aids. The patient also reports longstanding tinnitus bilaterally. The patient reports that approximately 6 years ago, he had episodes of fluctuating hearing loss that recovered. He also reports an episode of vertigo more than 10 years ago. The patient denies  Otalgia, aural fullness, otorrhea, and dizziness currently.  The patient notes difficulty with communication in a variety of listening situations.      OBJECTIVE:    Otoscopic exam indicates ears are clear of cerumen bilaterally     Pure Tone Thresholds assessed using conventional audiometry with good  reliability from 250-8000 Hz bilaterally using insert earphones     RIGHT:  mild-moderate sensorineural hearing loss    LEFT:    mild-moderate sensorineural hearing loss    Tympanogram:    RIGHT: normal eardrum mobility    LEFT:   normal eardrum mobility    Reflexes (reported by stimulus ear):  RIGHT: Ipsilateral is present at normal levels  RIGHT: Contralateral is present at normal levels  LEFT:   Ipsilateral is present at normal levels  LEFT:   Contralateral is present at normal levels      Speech Reception Threshold:    RIGHT: 30 dB HL    LEFT:   25 dB HL  Word Recognition Score:     RIGHT: 100% at 75 dB HL using NU-6 recorded word list.    LEFT:   100% at 75 dB HL using NU-6 recorded word list.      Compared to patient's previous audiogram dated 4/10/2018, hearing has remained stable. Today s results were discussed with the patient in detail.     Patient is a hearing aid candidate. Patient would like to move forward with a hearing aid evaluation today. Therefore, the patient was  presented with different options for amplification to help aid in communication. Discussed styles, levels of technology and monaural vs. binaural fitting.     The hearing aid(s) mutually chosen were:  Binaural: Phonak Audeo B90 Direct  COLOR: beige  BATTERY SIZE: 13  CANAL/ LENGTH: 2    ASSESSMENT:     ICD-10-CM    1. Sensorineural hearing loss (SNHL) of both ears H90.3 COMPREHENSIVE HEARING TEST     TYMPANOMETRY AND REFLEX THRESHOLD MEASUREMENTS     HEARING AID EXAM BINAURAL       Reviewed purchase information and warranty information with patient. The 45 day trial period was explained to patient. The patient was given a copy of the [place] Department of Health consumer brochure on purchasing hearing instruments. Patient risk factors have been provided to the patient in writing prior to the sale of the hearing aid per FDA regulation. The risk factors are also available in the User Instructional Booklet to be presented on the day of the hearing aid fitting. Hearing aid(s) ordered. Hearing aid evaluation completed.    PLAN: Gael is scheduled for an ENT evaluation next week. Gael is scheduled to return in 2-3 weeks for a hearing aid fitting and programming pending medical clearance. Purchase agreement will be completed on that date. Please contact this clinic with any questions or concerns.      Cholo Ly.  Doctor of Audiology  [place] License # [id number]

## 2018-10-24 NOTE — MR AVS SNAPSHOT
After Visit Summary   10/24/2018    Gael Le    MRN: [id number]           Patient Information     Date Of Birth          1960        Visit Information        Provider Department      10/24/2018 10:00 AM Lavelle Monsivais [facility]D [facility]        Today's Diagnoses     Sensorineural hearing loss (SNHL) of both ears    -  1       Follow-ups after your visit        Your next 10 appointments already scheduled     Nov 02, 2018  1:00 PM CDT   [facility] Visit with Radha [facility]uro [facility]   [facility]    [address] [place] [address]   [phone]            Nov 07, 2018 10:00 AM CST   Return Visit with Lenard Hogue   [facility]    [address] [place] [address]   [phone]            Nov 12, 2018  8:40 AM CST   Nurse Only with BA ANCILLARY   [facility]    [address] [place] [address] [phone]              Who to contact     If you have questions or need follow up information about today's clinic visit or your schedule please contact [facility] directly at [phone].  Normal or non-critical lab and imaging results will be communicated to you by MyChart, letter or phone within 4 business days after the clinic has received the results. If you do not hear from us within 7 days, please contact the clinic through MyChart or phone. If you have a critical or abnormal lab result, we will notify you by phone as soon as possible.  Submit refill requests through [employer] or call your pharmacy and they will forward the refill request to us. Please allow 3 business days for your refill to be completed.          Additional Information About Your Visit        [employer] Information     [employer] gives you secure access to your electronic health record. If you see a primary care  provider, you can also send messages to your care team and make appointments. If you have questions, please call your primary care clinic.  If you do not have a primary care provider, please call [phone] and they will assist you.      [employer] is an electronic gateway that provides easy, online access to your medical records. With [employer], you can request a clinic appointment, read your test results, renew a prescription or communicate with your care team.     To access your existing account, please contact your [facility] or call [phone] for assistance.        Care EveryWhere ID     This is your Care EveryWhere ID. This could be used by other organizations to access your [place] medical records  [id number]         Blood Pressure from Last 3 Encounters:   09/28/18 152/80   05/17/18 128/82   05/02/18 128/80    Weight from Last 3 Encounters:   09/28/18 175 lb (79.4 kg)   05/17/18 174 lb (78.9 kg)   05/02/18 177 lb 4.8 oz (80.4 kg)              We Performed the Following     AUDIOGRAM/TYMPANOGRAM - INTERFACE     COMPREHENSIVE HEARING TEST     HEARING AID EXAM BINAURAL     TYMPANOMETRY AND REFLEX THRESHOLD MEASUREMENTS        Primary Care Provider Office Phone # Fax #    Idania Marroquin [phone] [address] [facility] [address]        Equal Access to Services     APRIL SILVA : Hadii aad ku hadasho Soomaali, waaxda luqadaha, qaybta kaalmada adeegyada, waxay abby haybrittney riggs. So [facility] [phone].    ATENCIÓN: Si habla español, tiene a fritz disposición servicios gratuitos de asistencia lingüística. Llame al [phone].    We comply with applicable federal civil rights laws and [place] laws. We do not discriminate on the basis of race, color, national origin, age, disability, sex, sexual orientation, or gender identity.            Thank you!     Thank you for choosing [facility]  for your care. Our goal is  always to provide you with excellent care. Hearing back from our patients is one way we can continue to improve our services. Please take a few minutes to complete the written survey that you may receive in the mail after your visit with us. Thank you!             Your Updated Medication List - Protect others around you: Learn how to safely use, store and throw away your medicines at www.disposemymeds.org.          This list is accurate as of 10/24/18 11:11 AM.  Always use your most recent med list.                   Brand Name Dispense Instructions for use Diagnosis    ALPRAZolam 0.5 MG tablet    XANAX    30 tablet    TAKE 1 TABLET BY MOUTH THREE TIMES DAILY AS NEEDED FOR ANXIETY    Anxiety state       atorvastatin 20 MG tablet    LIPITOR    90 tablet    Take 1 tablet (20 mg) by mouth daily    Mixed hyperlipidemia       FISH OIL           losartan 25 MG tablet    COZAAR    90 tablet    Take 1 tablet (25 mg) by mouth daily    Benign essential hypertension       MULTI-VITAMIN DAILY PO      Take  by mouth.        tadalafil 5 MG tablet    CIALIS    30 tablet    Hand written script for 7 mg capsules to different pharmacy for patient.  Do not use with nitroglycerin, terazosin or doxazosin.    Erectile dysfunction, unspecified erectile dysfunction type       tamsulosin 0.4 MG capsule    FLOMAX    30 capsule    Take 1 capsule (0.4 mg) by mouth daily    Benign prostatic hyperplasia with urinary hesitancy

## 2018-10-29 DIAGNOSIS — N52.9 ERECTILE DYSFUNCTION, UNSPECIFIED ERECTILE DYSFUNCTION TYPE: ICD-10-CM

## 2018-10-29 NOTE — TELEPHONE ENCOUNTER
"Requested Prescriptions   Pending Prescriptions Disp Refills     tadalafil (CIALIS) 5 MG tablet 30 tablet 3     Sig: Hand written script for 7 mg capsules to different pharmacy for patient.  Do not use with nitroglycerin, terazosin or doxazosin.    Erectile Dysfuction Protocol Failed    10/29/2018 12:15 PM       Failed - Absence of Alpha Blockers on Med list       Passed - Absence of nitrates on medication list       Passed - Recent (12 mo) or future (30 days) visit within the authorizing provider's specialty    Patient had office visit in the last 12 months or has a visit in the next 30 days with authorizing provider or within the authorizing provider's specialty.  See \"Patient Info\" tab in inbasket, or \"Choose Columns\" in Meds & Orders section of the refill encounter.             Passed - Patient is age 18 or older        tadalafil (CIALIS) 5 MG tablet  Last Written Prescription Date:  12/4/17  Last Fill Quantity: 30,  # refills: 3   Last office visit: 9/28/2018 with prescribing provider:  Dr. Marroquin   Future Office Visit:   Next 5 appointments (look out 90 days)     Nov 07, 2018 10:00 AM CST   Return Visit with Lenard Hogue   [facility]    [address] [place] [address] [phone]            Nov 12, 2018  8:40 AM CST   Nurse Only with BA ANCILLARY   [facility]    [address] [place] [address] [phone]                   "

## 2018-10-31 NOTE — TELEPHONE ENCOUNTER
Routing refill request to provider for review/approval because:  Per protocol:   Refer to provider if patient on:     Nitrates or treatment for BPH (doxazosin, prazosin, terazosin, tamsulosin, afluzosin)     Alpha Blocker      Patient taking Tamsulosin.    Kelly Carter RN  [facility]

## 2018-11-02 RX ORDER — TADALAFIL 5 MG/1
5 TABLET ORAL DAILY
Qty: 15 TABLET | Refills: 0 | Status: SHIPPED | OUTPATIENT
Start: 2018-11-02 | End: 2018-11-15

## 2018-11-02 NOTE — TELEPHONE ENCOUNTER
Reason for Call:  Other prescription    Detailed comments: Patient is completely out - uses this medication for erectile disfunction and to be able to urinate in the middle of the night - patient is hoping to get a small one time prescription sent over to the [facility] in [place] while waiting for this larger prescription from the mail service pharmacy    Patient is concerned about being off of it.   Mail order pharmacy fills at 7 mg    Pharmacy may need to have ordered at 5 or 10 mg as they do not carry the 7 mg    Phone Number Patient can be reached at: Cell number on file:    Telephone Information:   Mobile [phone]       Best Time: Anytime    Can we leave a detailed message on this number? YES    Call taken on 11/2/2018 at 3:58 PM by Anita Wilhelm

## 2018-11-02 NOTE — TELEPHONE ENCOUNTER
Will send 5 mg prescription to [facility] and hold the 7 mg written prescription for Dr. Marroquin when she returns.

## 2018-11-09 ENCOUNTER — MYC MEDICAL ADVICE (OUTPATIENT)
Dept: FAMILY MEDICINE | Facility: CLINIC | Age: 58
End: 2018-11-09

## 2018-11-09 ENCOUNTER — OFFICE VISIT (OUTPATIENT)
Dept: OTOLARYNGOLOGY | Facility: CLINIC | Age: 58
End: 2018-11-09
Payer: COMMERCIAL

## 2018-11-09 VITALS
SYSTOLIC BLOOD PRESSURE: 160 MMHG | DIASTOLIC BLOOD PRESSURE: 86 MMHG | BODY MASS INDEX: 25.18 KG/M2 | WEIGHT: 170 LBS | HEART RATE: 98 BPM | HEIGHT: 69 IN

## 2018-11-09 DIAGNOSIS — H90.3 BILATERAL SENSORINEURAL HEARING LOSS: Primary | ICD-10-CM

## 2018-11-09 PROCEDURE — 99202 OFFICE O/P NEW SF 15 MIN: CPT | Performed by: OTOLARYNGOLOGY

## 2018-11-09 ASSESSMENT — ENCOUNTER SYMPTOMS
SPUTUM PRODUCTION: 0
VOMITING: 0
PHOTOPHOBIA: 0
CONSTITUTIONAL NEGATIVE: 1
BRUISES/BLEEDS EASILY: 0
BLURRED VISION: 0
SINUS PAIN: 0
TINGLING: 0
HEADACHES: 0
HEARTBURN: 0
DOUBLE VISION: 0
SORE THROAT: 0
STRIDOR: 0
DIZZINESS: 0
NAUSEA: 0
COUGH: 0
PALPITATIONS: 0
TREMORS: 0
HEMOPTYSIS: 0

## 2018-11-09 NOTE — LETTER
Hearing Aid Medical Clearance    Gael Le  November 9, 2018        This patient has received a medical examination and may be considered a suitable candidate for a hearing aid.         Physician:__________________________________________________

## 2018-11-09 NOTE — MR AVS SNAPSHOT
After Visit Summary   11/9/2018    Gael Le    MRN: [id number]           Patient Information     Date Of Birth          1960        Visit Information        Provider Department      11/9/2018 2:30 PM Radha Mauro MD [facility]        Today's Diagnoses     Bilateral sensorineural hearing loss    -  1       Follow-ups after your visit        Your next 10 appointments already scheduled     Nov 12, 2018  8:40 AM CST   Nurse [facility] with BA [facility]CILLARY   [facility]    [address] [address] [phone]            Nov 12, 2018  1:00 PM CST   Return Visit with Lenard Hogue   [facility] ([facility])    [address] [place] [address] [phone]              Who to contact     If you have questions or need follow up information about today's clinic visit or your schedule please contact [facility] directly at [phone].  Normal or non-critical lab and imaging results will be communicated to you by MyChart, letter or phone within 4 business days after the clinic has received the results. If you do not hear from us within 7 days, please contact the clinic through SpiderOakhart or phone. If you have a critical or abnormal lab result, we will notify you by phone as soon as possible.  Submit refill requests through Chatwala or call your pharmacy and they will forward the refill request to us. Please allow 3 business days for your refill to be completed.          Additional Information About Your Visit        SpiderOakhart Information     Chatwala gives you secure access to your electronic health record. If you see a primary care provider, you can also send messages to your care team and make appointments. If you have questions, please call your primary care clinic.  If you do not have a primary care provider, please call [phone] and  "they will assist you.      SharesVault is an electronic gateway that provides easy, online access to your medical records. With SharesVault, you can request a clinic appointment, read your test results, renew a prescription or communicate with your care team.     To access your existing account, please contact your [facility] or call [phone] for assistance.        Care EveryWhere ID     This is your Care EveryWhere ID. This could be used by other organizations to access your [facility] medical records  [id number]        Your Vitals Were     Pulse Height BMI (Body Mass Index)             98 1.753 m (5' 9\") 25.1 kg/m2          Blood Pressure from Last 3 Encounters:   11/09/18 160/86   09/28/18 152/80   05/17/18 128/82    Weight from Last 3 Encounters:   11/09/18 77.1 kg (170 lb)   09/28/18 79.4 kg (175 lb)   05/17/18 78.9 kg (174 lb)              Today, you had the following     No orders found for display       Primary Care Provider Office Phone # Fax #    Idania Marroquin [phone] [address] [facility] [address]  [facility] [address]        Equal Access to Services     [facility]: Hadii aad ku hadasho Soomaali, waaxda luqadaha, qaybta kaalmada adeegyada, saeed riggs. So [facility] [phone].    ATENCIÓN: Si habla español, tiene a fritz disposición servicios gratuitos de asistencia lingüística. Llame al [phone].    We comply with applicable federal civil rights laws and [place] laws. We do not discriminate on the basis of race, color, national origin, age, disability, sex, sexual orientation, or gender identity.            Thank you!     Thank you for choosing [facility]  for your care. Our goal is always to provide you with excellent care. Hearing back from our patients is one way we can continue to improve our services. Please take a few minutes to complete the written survey that you may receive in the mail after your " visit with us. Thank you!             Your Updated Medication List - Protect others around you: Learn how to safely use, store and throw away your medicines at www.disposemymeds.org.          This list is accurate as of 11/9/18  3:14 PM.  Always use your most recent med list.                   Brand Name Dispense Instructions for use Diagnosis    ALPRAZolam 0.5 MG tablet    XANAX    30 tablet    TAKE 1 TABLET BY MOUTH THREE TIMES DAILY AS NEEDED FOR ANXIETY    Anxiety state       atorvastatin 20 MG tablet    LIPITOR    90 tablet    Take 1 tablet (20 mg) by mouth daily    Mixed hyperlipidemia       FISH OIL           losartan 25 MG tablet    COZAAR    90 tablet    Take 1 tablet (25 mg) by mouth daily    Benign essential hypertension       MULTI-VITAMIN DAILY PO      Take  by mouth.        tadalafil 5 MG tablet    CIALIS    15 tablet    Take 1 tablet (5 mg) by mouth daily    Erectile dysfunction, unspecified erectile dysfunction type       tamsulosin 0.4 MG capsule    FLOMAX    30 capsule    Take 1 capsule (0.4 mg) by mouth daily    Benign prostatic hyperplasia with urinary hesitancy

## 2018-11-09 NOTE — LETTER
11/9/2018         RE: Gael Le  [address]  [facility] [facility] [address]        Dear Colleague,    Thank you for referring your patient, Gael Le, to the [facility]. Please see a copy of my visit note below.    HPI    This is a 58 year old patient who is here for hearing aid clearance today. He has been having hearing impairment for years. States bilateral hearing loss, TMJ issues, and tinnitus. States a sudden hearing loss in his right ear and that recovered later on. Describes a vertigo episode when he was 13. Since that time, he did not have any other vertigo issues. No hx of trauma, noise exposure. He has a family hx of hearing loss.  His hearing test showed bilateral SNHL; excellent recognition; present reflexes.    Review of Systems   Constitutional: Negative.    HENT: Positive for hearing loss and tinnitus. Negative for congestion, ear discharge, ear pain, nosebleeds, sinus pain and sore throat.    Eyes: Negative for blurred vision, double vision and photophobia.   Respiratory: Negative for cough, hemoptysis, sputum production and stridor.    Cardiovascular: Negative for chest pain and palpitations.   Gastrointestinal: Negative for heartburn, nausea and vomiting.   Skin: Negative.    Neurological: Negative for dizziness, tingling, tremors and headaches.   Endo/Heme/Allergies: Negative for environmental allergies. Does not bruise/bleed easily.         Physical Exam   Constitutional: He is well-developed, well-nourished, and in no distress.   HENT:   Head: Normocephalic and atraumatic.   Right Ear: Tympanic membrane, external ear and ear canal normal. No drainage, swelling or tenderness. No middle ear effusion. Decreased hearing is noted.   Left Ear: Tympanic membrane, external ear and ear canal normal. No drainage, swelling or tenderness.  No middle ear effusion. Decreased hearing is noted.   Nose: No mucosal edema, rhinorrhea or septal deviation.   Mouth/Throat: Uvula is  midline, oropharynx is clear and moist and mucous membranes are normal. No oropharyngeal exudate.   Eyes: Pupils are equal, round, and reactive to light.   Neck: Neck supple. No tracheal deviation present. No thyromegaly present.   Lymphadenopathy:     He has no cervical adenopathy.     A/P  Options discussed. His questions were answered. He will continue wearing HAs and make an appointment with Dr. Monsivais.      Again, thank you for allowing me to participate in the care of your patient.        Sincerely,        Radha Mauro MD

## 2018-11-09 NOTE — NURSING NOTE
"Gael Le's goals for this visit include:   Chief Complaint   Patient presents with     Consult     Hearing aid clearance       He requests these members of his care team be copied on today's visit information: yes      PCP: Idania Marroquin    Referring Provider:  No referring provider defined for this encounter.    /86  Pulse 98  Ht 1.753 m (5' 9\")  Wt 77.1 kg (170 lb)  BMI 25.1 kg/m2    Ruby Almazan CMA ([facility])    "

## 2018-11-09 NOTE — PROGRESS NOTES
HPI    This is a 58 year old patient who is here for hearing aid clearance today. He has been having hearing impairment for years. States bilateral hearing loss, TMJ issues, and tinnitus. States a sudden hearing loss in his right ear and that recovered later on. Describes a vertigo episode when he was 13. Since that time, he did not have any other vertigo issues. No hx of trauma, noise exposure. He has a family hx of hearing loss.  His hearing test showed bilateral SNHL; excellent recognition; present reflexes.    Review of Systems   Constitutional: Negative.    HENT: Positive for hearing loss and tinnitus. Negative for congestion, ear discharge, ear pain, nosebleeds, sinus pain and sore throat.    Eyes: Negative for blurred vision, double vision and photophobia.   Respiratory: Negative for cough, hemoptysis, sputum production and stridor.    Cardiovascular: Negative for chest pain and palpitations.   Gastrointestinal: Negative for heartburn, nausea and vomiting.   Skin: Negative.    Neurological: Negative for dizziness, tingling, tremors and headaches.   Endo/Heme/Allergies: Negative for environmental allergies. Does not bruise/bleed easily.         Physical Exam   Constitutional: He is well-developed, well-nourished, and in no distress.   HENT:   Head: Normocephalic and atraumatic.   Right Ear: Tympanic membrane, external ear and ear canal normal. No drainage, swelling or tenderness. No middle ear effusion. Decreased hearing is noted.   Left Ear: Tympanic membrane, external ear and ear canal normal. No drainage, swelling or tenderness.  No middle ear effusion. Decreased hearing is noted.   Nose: No mucosal edema, rhinorrhea or septal deviation.   Mouth/Throat: Uvula is midline, oropharynx is clear and moist and mucous membranes are normal. No oropharyngeal exudate.   Eyes: Pupils are equal, round, and reactive to light.   Neck: Neck supple. No tracheal deviation present. No thyromegaly present.   Lymphadenopathy:      He has no cervical adenopathy.     A/P  Options discussed. His questions were answered. He will continue wearing HAs and make an appointment with Dr. Monsivais.

## 2018-11-12 ENCOUNTER — ALLIED HEALTH/NURSE VISIT (OUTPATIENT)
Dept: NURSING | Facility: CLINIC | Age: 58
End: 2018-11-12
Payer: COMMERCIAL

## 2018-11-12 ENCOUNTER — OFFICE VISIT (OUTPATIENT)
Dept: AUDIOLOGY | Facility: CLINIC | Age: 58
End: 2018-11-12
Payer: COMMERCIAL

## 2018-11-12 DIAGNOSIS — Z23 NEED FOR SHINGLES VACCINE: Primary | ICD-10-CM

## 2018-11-12 DIAGNOSIS — H90.3 SENSORINEURAL HEARING LOSS (SNHL) OF BOTH EARS: Primary | ICD-10-CM

## 2018-11-12 PROCEDURE — V5261 HEARING AID, DIGIT, BIN, BTE: HCPCS | Performed by: AUDIOLOGIST

## 2018-11-12 PROCEDURE — 90471 IMMUNIZATION ADMIN: CPT

## 2018-11-12 PROCEDURE — V5011 HEARING AID FITTING/CHECKING: HCPCS | Performed by: AUDIOLOGIST

## 2018-11-12 PROCEDURE — 90750 HZV VACC RECOMBINANT IM: CPT

## 2018-11-12 PROCEDURE — 99207 ZZC NO CHARGE NURSE ONLY: CPT

## 2018-11-12 PROCEDURE — 92593 HC HEARING AID CHECK, BINAURAL: CPT | Performed by: AUDIOLOGIST

## 2018-11-12 PROCEDURE — V5020 CONFORMITY EVALUATION: HCPCS | Performed by: AUDIOLOGIST

## 2018-11-12 PROCEDURE — V5160 DISPENSING FEE BINAURAL: HCPCS | Performed by: AUDIOLOGIST

## 2018-11-12 NOTE — MR AVS SNAPSHOT
After Visit Summary   11/12/2018    Gael Le    MRN: [id number]           Patient Information     Date Of Birth          1960        Visit Information        Provider Department      11/12/2018 1:00 PM Lavelle Monsivais [facility]        Today's Diagnoses     Sensorineural hearing loss (SNHL) of both ears    -  1       Follow-ups after your visit        Your next 10 appointments already scheduled     Nov 28, 2018  4:30 PM CST   [facility] Visit with Lenard Hogue   [facility]    [address] [place] [address] [phone]              Who to contact     If you have questions or need follow up information about today's clinic visit or your schedule please contact [facility] directly at [phone].  Normal or non-critical lab and imaging results will be communicated to you by Advanced BioHealinghart, letter or phone within 4 business days after the clinic has received the results. If you do not hear from us within 7 days, please contact the clinic through Advanced BioHealinghart or phone. If you have a critical or abnormal lab result, we will notify you by phone as soon as possible.  Submit refill requests through Kanchufang or call your pharmacy and they will forward the refill request to us. Please allow 3 business days for your refill to be completed.          Additional Information About Your Visit        MyChart Information     Kanchufang gives you secure access to your electronic health record. If you see a primary care provider, you can also send messages to your care team and make appointments. If you have questions, please call your primary care clinic.  If you do not have a primary care provider, please call [phone] and they will assist you.      Kanchufang is an electronic gateway that provides easy, online access to your medical records. With Kanchufang, you can request a clinic appointment, read your test  results, renew a prescription or communicate with your care team.     To access your existing account, please contact your [facility] or call [phone] for assistance.        Care EveryWhere ID     This is your Care EveryWhere ID. This could be used by other organizations to access your [facility] medical records  [id number]         Blood Pressure from Last 3 Encounters:   11/09/18 160/86   09/28/18 152/80   05/17/18 128/82    Weight from Last 3 Encounters:   11/09/18 170 lb (77.1 kg)   09/28/18 175 lb (79.4 kg)   05/17/18 174 lb (78.9 kg)              We Performed the Following     DISPENSING FEE, BINAURAL HEARING AID     HEARING AID BTE DIGITAL, BINAURAL     HEARING AID CHECK, BINAURAL     HEARING AID CONFORMITY EVALUATION     HEARING AID FIT/ORIENTATION/CHECK        Primary Care Provider Office Phone # Fax #    Idania Marroquin [phone] [address] [facility] [address]        Equal Access to Services     [facility]: Hadii aad ku hadasho Soomaali, waaxda luqadaha, qaybta kaalmada adeegyada, waxay idiin hayaan nilameg kharash chantal . So [facility] [phone].    ATENCIÓN: Si habla español, tiene a fritz disposición servicios gratuitos de asistencia lingüística. Llame al [phone].    We comply with applicable federal civil rights laws and [place] laws. We do not discriminate on the basis of race, color, national origin, age, disability, sex, sexual orientation, or gender identity.            Thank you!     Thank you for choosing [facility]  for your care. Our goal is always to provide you with excellent care. Hearing back from our patients is one way we can continue to improve our services. Please take a few minutes to complete the written survey that you may receive in the mail after your visit with us. Thank you!             Your Updated Medication List - Protect others around you: Learn how to safely use, store and throw away  your medicines at www.disposemymeds.org.          This list is accurate as of 11/12/18  4:42 PM.  Always use your most recent med list.                   Brand Name Dispense Instructions for use Diagnosis    ALPRAZolam 0.5 MG tablet    XANAX    30 tablet    TAKE 1 TABLET BY MOUTH THREE TIMES DAILY AS NEEDED FOR ANXIETY    Anxiety state       atorvastatin 20 MG tablet    LIPITOR    90 tablet    Take 1 tablet (20 mg) by mouth daily    Mixed hyperlipidemia       FISH OIL           losartan 25 MG tablet    COZAAR    90 tablet    Take 1 tablet (25 mg) by mouth daily    Benign essential hypertension       MULTI-VITAMIN DAILY PO      Take  by mouth.        tadalafil 5 MG tablet    CIALIS    15 tablet    Take 1 tablet (5 mg) by mouth daily    Erectile dysfunction, unspecified erectile dysfunction type       tamsulosin 0.4 MG capsule    FLOMAX    30 capsule    Take 1 capsule (0.4 mg) by mouth daily    Benign prostatic hyperplasia with urinary hesitancy

## 2018-11-12 NOTE — MR AVS SNAPSHOT
After Visit Summary   11/12/2018    Gael Le    MRN: [id number]           Patient Information     Date Of Birth          1960        Visit Information        Provider Department      11/12/2018 8:40 AM BA ANCILLARY [facility]        Today's Diagnoses     Need for shingles vaccine    -  1       Follow-ups after your visit        Your next 10 appointments already scheduled     Nov 12, 2018  1:00 PM CST   Return Visit with Lenard Hogue   [facility]    [address] [place] [address] [phone]              Who to contact     If you have questions or need follow up information about today's clinic visit or your schedule please contact [facility] directly at [phone].  Normal or non-critical lab and imaging results will be communicated to you by MyChart, letter or phone within 4 business days after the clinic has received the results. If you do not hear from us within 7 days, please contact the clinic through MyChart or phone. If you have a critical or abnormal lab result, we will notify you by phone as soon as possible.  Submit refill requests through [employer] or call your pharmacy and they will forward the refill request to us. Please allow 3 business days for your refill to be completed.          Additional Information About Your Visit        MyChart Information     [employer] gives you secure access to your electronic health record. If you see a primary care provider, you can also send messages to your care team and make appointments. If you have questions, please call your primary care clinic.  If you do not have a primary care provider, please call [phone] and they will assist you.        Care EveryWhere ID     This is your Care EveryWhere ID. This could be used by other organizations to access your [facility] medical records  [id number]         Blood Pressure from Last 3  Encounters:   11/09/18 160/86   09/28/18 152/80   05/17/18 128/82    Weight from Last 3 Encounters:   11/09/18 77.1 kg (170 lb)   09/28/18 79.4 kg (175 lb)   05/17/18 78.9 kg (174 lb)              We Performed the Following     VACCINE ADMINISTRATION, INITIAL     ZOSTER VACCINE RECOMBINANT ADJUVANTED IM NJX        Primary Care Provider Office Phone # Fax #    Idania Marroquin [phone] [address] [facility] [address]  [facility] [address]        Equal Access to Services     [facility]: Hadii aad ku hadasho Soomaali, waaxda luqadaha, qaybta kaalmada adeegyajeanne, saeed cornejo . So [facility] [place] [phone].    ATENCIÓN: Si habla español, tiene a fritz disposición servicios gratuitos de asistencia lingüística. [facility] [phone].    We comply with applicable federal civil rights laws and [place] laws. We do not discriminate on the basis of race, color, national origin, age, disability, sex, sexual orientation, or gender identity.            Thank you!     Thank you for choosing [facility]  for your care. Our goal is always to provide you with excellent care. Hearing back from our patients is one way we can continue to improve our services. Please take a few minutes to complete the written survey that you may receive in the mail after your visit with us. Thank you!             Your Updated Medication List - Protect others around you: Learn how to safely use, store and throw away your medicines at www.disposemymeds.org.          This list is accurate as of 11/12/18  8:56 AM.  Always use your most recent med list.                   Brand Name Dispense Instructions for use Diagnosis    ALPRAZolam 0.5 MG tablet    XANAX    30 tablet    TAKE 1 TABLET BY MOUTH THREE TIMES DAILY AS NEEDED FOR ANXIETY    Anxiety state       atorvastatin 20 MG tablet    LIPITOR    90 tablet    Take 1 tablet (20 mg) by mouth daily    Mixed hyperlipidemia       FISH OIL           losartan 25 MG  tablet    COZAAR    90 tablet    Take 1 tablet (25 mg) by mouth daily    Benign essential hypertension       MULTI-VITAMIN DAILY PO      Take  by mouth.        tadalafil 5 MG tablet    CIALIS    15 tablet    Take 1 tablet (5 mg) by mouth daily    Erectile dysfunction, unspecified erectile dysfunction type       tamsulosin 0.4 MG capsule    FLOMAX    30 capsule    Take 1 capsule (0.4 mg) by mouth daily    Benign prostatic hyperplasia with urinary hesitancy

## 2018-11-12 NOTE — PROGRESS NOTES
AUDIOLOGY REPORT    SUBJECTIVE: Gael Le, a 58 year old male, was seen in the Audiology Clinic at [facility] today for a Binaural hearing aid fitting. Previous results have revealed a bilateral sensorineural hearing loss. The patient was given medical clearance to pursue amplification by  Tyler Mauro MD.      OBJECTIVE:  Prior to fitting, a hearing aid check was performed to ensure device functionality. The hearing aid conformity evaluation was completed.The hearing aids were placed and they provided a good fit. Real-ear-probe-microphone measurements were completed on the Education Elements system and were a good match to NAL-NL2 target with soft sounds audible, moderate sounds comfortable, and loud sounds below discomfort. UCLs are verified through maximum power output measures and demonstrate appropriate limiting of loud inputs. Mr. Le was oriented to proper hearing aid use, care, cleaning (no water, dry brush), batteries (size 13, insertion/removal, toxicity, low-battery signal), aid insertion/removal, user booklet, warranty information, storage cases, and other hearing aid details. The patient confirmed understanding of hearing aid use and care, and showed proper insertion of hearing aid and batteries while in the office today. Mr. Le reported good volume and sound quality today.    CHARGES:   Hearing Aid Check: Binaural, [address], $81.00  Dispensing Fee: Binaural, , $500.00  Fit/Orientation: Binaural, , $322.00  Hearing Aid Conformity Evaluation: , Qty:2 $174  Hearing Aid Digital: Binaural, BTE,  $5523     ASSESSMENT: Bianural hearing aid fitting completed today. Verification measures were performed. The 45 day trial period was explained to patient, and they expressed understanding. Mr. Le signed the Hearing Aid Purchase Agreement and was given a copy, as well as details on his hearing aids. Patient was counseled that exact out of pocket amounts cannot  be determined for hearing aid claims being sent to insurance. Any insurance coverage information presented to the patient is an estimate only, and is not a guarantee of payment. Patient has been advised to check with their own insurance.    PLAN: Mr. Le will return for follow-up in 2-3 weeks for a hearing aid review appointment. Please call this clinic with questions regarding today s appointment.    Cholo Ly.  Doctor of Audiology  [place] License # [id number]

## 2018-11-12 NOTE — NURSING NOTE
Screening Questionnaire for Adult Immunization    Are you sick today?   No   Do you have allergies to medications, food, a vaccine component or latex?   No   Have you ever had a serious reaction after receiving a vaccination?   No   Do you have a long-term health problem with heart disease, lung disease, asthma, kidney disease, metabolic disease (e.g. diabetes), anemia, or other blood disorder?   No   Do you have cancer, leukemia, HIV/AIDS, or any other immune system problem?   No   In the past 3 months, have you taken medications that affect  your immune system, such as prednisone, other steroids, or anticancer drugs; drugs for the treatment of rheumatoid arthritis, Crohn s disease, or psoriasis; or have you had radiation treatments?   No   Have you had a seizure, or a brain or other nervous system problem?   No   During the past year, have you received a transfusion of blood or blood     products, or been given immune (gamma) globulin or antiviral drug?   No   For women: Are you pregnant or is there a chance you could become        pregnant during the next month?   No   Have you received any vaccinations in the past 4 weeks?   No     Immunization questionnaire answers were all negative.        Per orders of Dr. Marroquin, injection of Shingrix given by Janiya Medina. Patient instructed to remain in clinic for 15 minutes afterwards, and to report any adverse reaction to me immediately.       Screening performed by Janiya Medina on 11/12/2018 at 8:59 AM.

## 2018-11-14 ENCOUNTER — TELEPHONE (OUTPATIENT)
Dept: FAMILY MEDICINE | Facility: CLINIC | Age: 58
End: 2018-11-14

## 2018-11-14 DIAGNOSIS — N52.9 ERECTILE DYSFUNCTION, UNSPECIFIED ERECTILE DYSFUNCTION TYPE: ICD-10-CM

## 2018-11-14 NOTE — TELEPHONE ENCOUNTER
Please call to verify with patient - he previously was taking a handwritten script for 7 mg for use daily.  Verify her permanently wants to change to 5 mg daily.  If so, I will send this script in for him.  ANIRUDH

## 2018-11-14 NOTE — TELEPHONE ENCOUNTER
Reason for Call:  Medication or medication refill:    Do you use a [facility]?  Name of the pharmacy and phone number for the current request:  [facility]/pharmacy #[id number] [facility] [address]    Name of the medication requested: Cialis 5mg    Other request: Received in mail 7mg but compound pharmacy does not carry 7mg. Wants to start 5mg Cialis and using [facility] in [place]. Did not fill mailed prescription please call and let him know if and when you send it to pharmacy.     Can we leave a detailed message on this number? YES    Phone number patient can be reached at: [phone] (C)    Best Time: Any    Call taken on 11/14/2018 at 3:19 PM by Sandeep Monroe

## 2018-11-14 NOTE — TELEPHONE ENCOUNTER
Routing refill request to provider for review/approval because:  Dosing on 7 mg not on medication list and medication list states patient not taking 11/9/18.    Monica Jeffers RN, [facility]

## 2018-11-15 RX ORDER — TADALAFIL 5 MG/1
5 TABLET ORAL DAILY
Qty: 30 TABLET | Refills: 5 | Status: SHIPPED | OUTPATIENT
Start: 2018-11-15 | End: 2019-05-16

## 2018-11-15 NOTE — TELEPHONE ENCOUNTER
Dr. Marroquin, patient would like to permanently change Rx to 5mg daily.   Please send [facility] to [facility] in [place].     [id number], MEDICAL ASSISTANT

## 2018-12-03 ENCOUNTER — OFFICE VISIT (OUTPATIENT)
Dept: AUDIOLOGY | Facility: CLINIC | Age: 58
End: 2018-12-03
Payer: COMMERCIAL

## 2018-12-03 DIAGNOSIS — H90.3 SENSORINEURAL HEARING LOSS (SNHL) OF BOTH EARS: Primary | ICD-10-CM

## 2018-12-03 PROCEDURE — V5299 HEARING SERVICE: HCPCS | Performed by: AUDIOLOGIST

## 2018-12-03 NOTE — PROGRESS NOTES
AUDIOLOGY REPORT    SUBJECTIVE: Gael Le, a 58 year old male, was seen in the Audiology Clinic at [facility] today for a binaural exchange hearing aid fitting. Previous results have revealed a bilateral mild to moderate sensorineural hearing loss. The patient was initially fit with Phonak Audeo B Direct 900 hearing aids. At the time, he expressed interest in the Phonak Audeo [place] hearing aids which have since been released. He returns today for exchange fitting.     OBJECTIVE:  Prior to fitting, a hearing aid check was performed to ensure device functionality. The hearing aid conformity evaluation was completed.The hearing aids were placed and they provided a good fit. Real-ear-probe-microphone measurements were completed on the ThisClicks system and were a good match to NAL-NL2 target with soft sounds audible, moderate sounds comfortable, and loud sounds below discomfort. UCLs are verified through maximum power output measures and demonstrate appropriate limiting of loud inputs. Mr. Le was oriented to proper hearing aid use, care, cleaning (no water, dry brush), batteries (size 312, insertion/removal, toxicity, low-battery signal), aid insertion/removal, user booklet, warranty information, storage cases, and other hearing aid details. The patient confirmed understanding of hearing aid use and care, and showed proper insertion of hearing aid and batteries while in the office today. Mr. Le reported good volume and sound quality today.    EAR(S) FIT:    Phonak Audeo [place]     Warranty expiration: 2/23/2022     ASSESSMENT: Binaural hearing aid fitting completed today. Verification measures were performed. The 45 day trial period was explained to patient, and they expressed understanding. Mr. Le signed the Hearing Aid Purchase Agreement and was given a copy, as well as details on his hearing aids. Patient was counseled that exact out of pocket amounts cannot be  determined for hearing aid claims being sent to insurance. Any insurance coverage information presented to the patient is an estimate only, and is not a guarantee of payment. Patient has been advised to check with their own insurance. We will return the patient's previous hearing aids to [employer] for a refund.     PLAN: Mr. Le will return for follow-up in 2-3 weeks for a hearing aid review appointment. Please call this clinic with questions regarding today s appointment.    Cholo Ly.  Doctor of Audiology  [place] License # [id number]

## 2018-12-03 NOTE — MR AVS SNAPSHOT
After Visit Summary   12/3/2018    Gael Le    MRN: [id number]           Patient Information     Date Of Birth          1960        Visit Information        Provider Department      12/3/2018 3:00 PM Lavelle Monsivais AuD [facility]        Today's Diagnoses     Sensorineural hearing loss (SNHL) of both ears    -  1       Follow-ups after your visit        Your next 10 appointments already scheduled     Dec 06, 2018  8:00 AM CST   New Visit with Jonatan Steven MD   [facility] ([facility])    [address] [place] [id number] [phone]              Who to contact     If you have questions or need follow up information about today's clinic visit or your schedule please contact [facility] directly at [phone].  Normal or non-critical lab and imaging results will be communicated to you by Pycnohart, letter or phone within 4 business days after the clinic has received the results. If you do not hear from us within 7 days, please contact the clinic through Pycnohart or phone. If you have a critical or abnormal lab result, we will notify you by phone as soon as possible.  Submit refill requests through Smit Ovens or call your pharmacy and they will forward the refill request to us. Please allow 3 business days for your refill to be completed.          Additional Information About Your Visit        MyChart Information     Smit Ovens gives you secure access to your electronic health record. If you see a primary care provider, you can also send messages to your care team and make appointments. If you have questions, please call your primary care clinic.  If you do not have a primary care provider, please call [phone] and they will assist you.      Smit Ovens is an electronic gateway that provides easy, online access to your medical records. With Smit Ovens, you can request a clinic appointment, read your test results,  renew a prescription or communicate with your care team.     To access your existing account, please contact your [facility] or call [phone] for assistance.        Care EveryWhere ID     This is your Care EveryWhere ID. This could be used by other organizations to access your [facility] medical records  [id number]         Blood Pressure from Last 3 Encounters:   11/09/18 160/86   09/28/18 152/80   05/17/18 128/82    Weight from Last 3 Encounters:   11/09/18 170 lb (77.1 kg)   09/28/18 175 lb (79.4 kg)   05/17/18 174 lb (78.9 kg)              We Performed the Following     HEARING AID CHECK/NO CHARGE        Primary Care Provider Office Phone # Fax #    Idania Marroquin [phone] [address] [facility] [address]        Equal Access to Services     [facility]: Hadii aad ku hadasho Soomaali, waaxda luqadaha, qaybta kaalmada adeegyada, waxay idiin hayaan adesouleymane kharakane cornejo . So [facility] [phone].    ATENCIÓN: Si habla español, tiene a fritz disposición servicios gratuitos de asistencia lingüística. Llame al [phone].    We comply with applicable federal civil rights laws and [place] laws. We do not discriminate on the basis of race, color, national origin, age, disability, sex, sexual orientation, or gender identity.            Thank you!     Thank you for choosing [facility]  for your care. Our goal is always to provide you with excellent care. Hearing back from our patients is one way we can continue to improve our services. Please take a few minutes to complete the written survey that you may receive in the mail after your visit with us. Thank you!             Your Updated Medication List - Protect others around you: Learn how to safely use, store and throw away your medicines at www.disposemymeds.org.          This list is accurate as of 12/3/18  3:50 PM.  Always use your most recent med list.                   Brand Name  Dispense Instructions for use Diagnosis    ALPRAZolam 0.5 MG tablet    XANAX    30 tablet    TAKE 1 TABLET BY MOUTH THREE TIMES DAILY AS NEEDED FOR ANXIETY    Anxiety state       atorvastatin 20 MG tablet    LIPITOR    90 tablet    Take 1 tablet (20 mg) by mouth daily    Mixed hyperlipidemia       FISH OIL           losartan 25 MG tablet    COZAAR    90 tablet    Take 1 tablet (25 mg) by mouth daily    Benign essential hypertension       MULTI-VITAMIN DAILY PO      Take  by mouth.        tadalafil 5 MG tablet    CIALIS    30 tablet    Take 1 tablet (5 mg) by mouth daily    Erectile dysfunction, unspecified erectile dysfunction type       tamsulosin 0.4 MG capsule    FLOMAX    30 capsule    Take 1 capsule (0.4 mg) by mouth daily    Benign prostatic hyperplasia with urinary hesitancy

## 2018-12-06 ENCOUNTER — OFFICE VISIT (OUTPATIENT)
Dept: UROLOGY | Facility: CLINIC | Age: 58
End: 2018-12-06
Payer: COMMERCIAL

## 2018-12-06 VITALS
SYSTOLIC BLOOD PRESSURE: 135 MMHG | WEIGHT: 175.3 LBS | BODY MASS INDEX: 25.96 KG/M2 | HEART RATE: 72 BPM | OXYGEN SATURATION: 95 % | DIASTOLIC BLOOD PRESSURE: 83 MMHG | HEIGHT: 69 IN

## 2018-12-06 DIAGNOSIS — N52.9 ERECTILE DYSFUNCTION, UNSPECIFIED ERECTILE DYSFUNCTION TYPE: Primary | ICD-10-CM

## 2018-12-06 DIAGNOSIS — R39.9 LOWER URINARY TRACT SYMPTOMS (LUTS): ICD-10-CM

## 2018-12-06 PROCEDURE — 99204 OFFICE O/P NEW MOD 45 MIN: CPT | Performed by: UROLOGY

## 2018-12-06 ASSESSMENT — PAIN SCALES - GENERAL: PAINLEVEL: NO PAIN (0)

## 2018-12-06 NOTE — PROGRESS NOTES
Urology Consult History and Physical  BRANDY LYMAN   Name: Gael Le    MRN: [id number]   YOB: 1960       We were asked to see Gael Le at the request of SELF for evaluation and treatment of Erectile dysfunction and LUTS.        Chief Complaint:   Erectile dysfunction and LUTS    History is obtained from the patient            History of Present Illness:   Gael Le is a 58 year old male who is being seen for evaluation of Erectile dysfunction and LUTS    Erectile dysfunction   - started in early 2000  - started with Viagra which worked well  - [facility]ed [facility]  - then started on daily cialis - as tried both the 5mg and 7mg from a compounding pharmacy - this had been working well, however notes decreased efficacy over the past several months  - notes strong morning erection most of the time  - reports gets firm enough erection to use for intercourse 30% of the time    Nocturia and LUTS  - notes frequency of urination with at times 4-5x/night  - daytime frequency 1-2 hours - worse with caffeine intake with mild urgency  - slow stream and prolonged voiding - especially at night and at time sensation of incomplete emptying   - no episode of acute urinary retention   - discussed tamsulosin 0.4mg (Flomax) with PCP but did not take due to side effect concern             Past Medical History:     Past Medical History:   Diagnosis Date     Hypertension             Past Surgical History:     Past Surgical History:   Procedure Laterality Date     COLONOSCOPY WITH CO2 INSUFFLATION N/A 11/21/2016    Procedure: COLONOSCOPY WITH CO2 INSUFFLATION;  Surgeon: Adeel Graf MD;  Location: [facility] OR     FINGER SURGERY       HERNIA REPAIR              Social History:     Social History   Substance Use Topics     Smoking status: Never Smoker     Smokeless tobacco: Never Used     Alcohol use Yes       History   Smoking Status     Never Smoker   Smokeless Tobacco     Never Used          "   Family History:     Family History   Problem Relation Age of Onset     Dementia Mother      multiinfarct dementia     Diabetes Father      Cerebrovascular Disease Father      Skin Cancer Father      BCC     Myocardial Infarction Paternal Grandmother      Colon Cancer No family hx of      Prostate Cancer No family hx of      Asthma No family hx of               Allergies:     Allergies   Allergen Reactions     Amoxicillin Hives     Lisinopril-Hydrochlorothiazide Cough     Penicillins Hives            Medications:     Current Outpatient Prescriptions   Medication Sig     atorvastatin (LIPITOR) 20 MG tablet Take 1 tablet (20 mg) by mouth daily     FISH OIL      losartan (COZAAR) 25 MG tablet Take 1 tablet (25 mg) by mouth daily     Multiple Vitamin (MULTI-VITAMIN DAILY PO) Take  by mouth.     tadalafil (CIALIS) 5 MG tablet Take 1 tablet (5 mg) by mouth daily     ALPRAZolam (XANAX) 0.5 MG tablet TAKE 1 TABLET BY MOUTH THREE TIMES DAILY AS NEEDED FOR ANXIETY (Patient not taking: Reported on 11/9/2018)     tamsulosin (FLOMAX) 0.4 MG capsule Take 1 capsule (0.4 mg) by mouth daily (Patient not taking: Reported on 11/9/2018)     No current facility-administered medications for this visit.              Review of Systems:     Skin: negative  Eyes: glasses  Ears/Nose/Throat: negative  Respiratory: No shortness of breath, dyspnea on exertion, cough, or hemoptysis  Cardiovascular: negative  Gastrointestinal: negative  Genitourinary: as above  Musculoskeletal: negative  Neurologic: negative  Psychiatric: negative  Hematologic/Lymphatic/Immunologic: negative  Endocrine: negative          Physical Exam:   Patient Vitals for the past 24 hrs:   BP Pulse SpO2 Height Weight   12/06/18 0810 135/83 72 95 % 1.753 m (5' 9\") 79.5 kg (175 lb 4.8 oz)     Body mass index is 25.89 kg/(m^2).     General: age-appropriate appearing male in NAD  HEENT: Head AT/NC, EOMI, CN Grossly intact  Lungs: no respiratory distress, or pursed lip " breathing  Heart: No obvious jugular venous distension present  Back: no bony midline tenderness, no CVAT bilaterally.  Abdomen: soft, no-distended, non-tender. No organomegaly  : normal male external genitalia.   Rectal: 25cc gland, no nodules or induration   Lymph: no palpable inguinal lymphadenopathy.  LE: no edema.   Musculoskeltal: extremities normal, no peripheral edema  Skin: no suspicious lesions or rashes  Neuro: Alert, oriented, speech and mentation normal;  moving all 4 extremities equally.  Psych: affect and mood normal          Data:   All laboratory data reviewed:    UA RESULTS:  Recent Labs   Lab Test  11/22/16   0928   COLOR  Yellow   APPEARANCE  Clear   URINEGLC  Negative   URINEBILI  Negative   URINEKETONE  Negative   SG  1.025   UBLD  Negative   URINEPH  5.5   PROTEIN  Negative   UROBILINOGEN  0.2   NITRITE  Negative   LEUKEST  Negative      PSA   Date Value Ref Range Status   09/01/2017 0.85 0 - 4 ug/L Final     Comment:     Assay Method:  Chemiluminescence using Siemens Vista analyzer      Lab Results   Component Value Date    CR 0.89 05/02/2018             Impression and Plan:   Impression:   58 year old man with long history of Erectile dysfunction and worsening LUTS      Plan:   Erectile dysfunction  - We discussed the various treatment options which include PDE-5i, vacuum assist device, intracavernosal injections, and IPP  - We discussed trial of different PDE-5i with avanafil 200mg (Stendra) - start with 1/2 tab    LUTS  - we discussed the mechanism of action of tamsulosin 0.4mg (Flomax) and that the ejaculation change is retro-ejaculation and not a change in orgasm  - he will try this medication    F/U in 3-6 months at patient convenience       Thank you for the kind consultation.    Time spent: 30 minutes of which >50% was spent counseling.    Jonatan Steven MD   Urology  [facility]  [facility] Phone: [phone]  [facility]  Phone: [phone]

## 2018-12-06 NOTE — MR AVS SNAPSHOT
After Visit Summary   12/6/2018    Gael Le    MRN: [id number]           Patient Information     Date Of Birth          1960        Visit Information        Provider Department      12/6/2018 8:00 AM Jonatan Steven MD [facility]        Today's Diagnoses     Erectile dysfunction, unspecified erectile dysfunction type    -  1    Lower urinary tract symptoms (LUTS)          Care Instructions    Look into the [facility]          Follow-ups after your visit        Who to contact     If you have questions or need follow up information about today's clinic visit or your schedule please contact [facility] directly at [phone].  Normal or non-critical lab and imaging results will be communicated to you by MyChart, letter or phone within 4 business days after the clinic has received the results. If you do not hear from us within 7 days, please contact the clinic through PPT Reasearchhart or phone. If you have a critical or abnormal lab result, we will notify you by phone as soon as possible.  Submit refill requests through Lemur IMS or call your pharmacy and they will forward the refill request to us. Please allow 3 business days for your refill to be completed.          Additional Information About Your Visit        MyChart Information     [employer] I[employer] gives you secure access to your electronic health record. If you see a primary care provider, you can also send messages to your care team and make appointments. If you have questions, please call your primary care clinic.  If you do not have a primary care provider, please call [phone] and they will assist you.      [employer] is an electronic gateway that provides easy, online access to your medical records. With [employer], you can request a clinic appointment, read your test results, renew a prescription or communicate with your care team.     To access your existing account, please  "contact your [facility] or call [phone] for assistance.        Care EveryWhere ID     This is your Care EveryWhere ID. This could be used by other organizations to access your [place] [facility] medical records  [id number]        Your Vitals Were     Pulse Height Pulse Oximetry BMI (Body Mass Index)          72 1.753 m (5' 9\") 95% 25.89 kg/m2         Blood Pressure from Last 3 Encounters:   12/06/18 135/83   11/09/18 160/86   09/28/18 152/80    Weight from Last 3 Encounters:   12/06/18 79.5 kg (175 lb 4.8 oz)   11/09/18 77.1 kg (170 lb)   09/28/18 79.4 kg (175 lb)              Today, you had the following     No orders found for display         Today's Medication Changes          These changes are accurate as of 12/6/18  8:50 AM.  If you have any questions, ask your nurse or doctor.               Start taking these medicines.        Dose/Directions    Avanafil 200 MG Tabs   Used for:  Erectile dysfunction, unspecified erectile dysfunction type   Started by:  Jonatan Steven MD        Dose:  200 mg   Take 200 mg by mouth as needed (for intercourse)   Quantity:  6 tablet   Refills:  3            Where to get your medicines      Some of these will need a paper prescription and others can be bought over the counter.  Ask your nurse if you have questions.     Bring a paper prescription for each of these medications     Avanafil 200 MG Tabs                Primary Care Provider Office Phone # Fax #    dIania Marroquin [phone] [address] [facility] [address]  [facility] [address]        Equal Access to Services     [facility] : Hadii dieudonne foremano Sodc, waaxda luqadaha, qaybta kaalmada adeegyada, waxthee idishara riggs. So [facility] [phone].    ATENCIÓN: Si habla español, tiene a fritz disposición servicios gratuitos de asistencia lingüística. Llame al [phone].    We comply with applicable federal civil rights laws and [place] laws. We do not " discriminate on the basis of race, color, national origin, age, disability, sex, sexual orientation, or gender identity.            Thank you!     Thank you for choosing [facility]  for your care. Our goal is always to provide you with excellent care. Hearing back from our patients is one way we can continue to improve our services. Please take a few minutes to complete the written survey that you may receive in the mail after your visit with us. Thank you!             Your Updated Medication List - Protect others around you: Learn how to safely use, store and throw away your medicines at www.disposemymeds.org.          This list is accurate as of 12/6/18  8:50 AM.  Always use your most recent med list.                   Brand Name Dispense Instructions for use Diagnosis    ALPRAZolam 0.5 MG tablet    XANAX    30 tablet    TAKE 1 TABLET BY MOUTH THREE TIMES DAILY AS NEEDED FOR ANXIETY    Anxiety state       atorvastatin 20 MG tablet    LIPITOR    90 tablet    Take 1 tablet (20 mg) by mouth daily    Mixed hyperlipidemia       Avanafil 200 MG Tabs     6 tablet    Take 200 mg by mouth as needed (for intercourse)    Erectile dysfunction, unspecified erectile dysfunction type       FISH OIL           losartan 25 MG tablet    COZAAR    90 tablet    Take 1 tablet (25 mg) by mouth daily    Benign essential hypertension       MULTI-VITAMIN DAILY PO      Take  by mouth.        tadalafil 5 MG tablet    CIALIS    30 tablet    Take 1 tablet (5 mg) by mouth daily    Erectile dysfunction, unspecified erectile dysfunction type       tamsulosin 0.4 MG capsule    FLOMAX    30 capsule    Take 1 capsule (0.4 mg) by mouth daily    Benign prostatic hyperplasia with urinary hesitancy

## 2018-12-06 NOTE — NURSING NOTE
"Gael Le's goals for this visit include:   Chief Complaint   Patient presents with     Consult     ED, transfering care from [facility]       He requests these members of his care team be copied on today's visit information: Yes    PCP: Idania Marroquin    Referring Provider:  No referring provider defined for this encounter.    /83 (BP Location: Left arm, Patient Position: Sitting, Cuff Size: Adult Regular)  Pulse 72  Ht 1.753 m (5' 9\")  Wt 79.5 kg (175 lb 4.8 oz)  SpO2 95%  BMI 25.89 kg/m2    Do you need any medication refills at today's visit? No    "

## 2019-01-28 DIAGNOSIS — F41.1 ANXIETY STATE: ICD-10-CM

## 2019-01-28 RX ORDER — ALPRAZOLAM 0.5 MG
TABLET ORAL
Qty: 30 TABLET | Refills: 0 | Status: SHIPPED | OUTPATIENT
Start: 2019-01-28 | End: 2020-01-30

## 2019-01-28 NOTE — TELEPHONE ENCOUNTER
Requested Prescriptions   Pending Prescriptions Disp Refills     ALPRAZolam (XANAX) 0.5 MG tablet 30 tablet 0     Sig: TAKE 1 TABLET BY MOUTH THREE TIMES DAILY AS NEEDED FOR ANXIETY    There is no refill protocol information for this order          ALPRAZolam (XANAX) 0.5 MG tablet      Last Written Prescription Date:  5/17/18  Last Fill Quantity: 30,   # refills: 0  Last Office Visit: 9/28/18  Future Office visit:       Routing refill request to provider for review/approval because:  Drug not on the [facility], [facility] or [facility] refill protocol or controlled substance

## 2019-01-28 NOTE — TELEPHONE ENCOUNTER
Routing refill request to provider for review/approval because:  Drug not on the [facility] refill protocol   Daya Calderón RN

## 2019-02-18 ENCOUNTER — MYC MEDICAL ADVICE (OUTPATIENT)
Dept: FAMILY MEDICINE | Facility: CLINIC | Age: 59
End: 2019-02-18

## 2019-02-18 DIAGNOSIS — I10 BENIGN ESSENTIAL HYPERTENSION: ICD-10-CM

## 2019-02-18 RX ORDER — LOSARTAN POTASSIUM 25 MG/1
TABLET ORAL
Qty: 90 TABLET | Refills: 0 | Status: SHIPPED | OUTPATIENT
Start: 2019-02-18 | End: 2019-05-29

## 2019-02-18 NOTE — TELEPHONE ENCOUNTER
BP Readings from Last 6 Encounters:   12/06/18 135/83   11/09/18 160/86   09/28/18 152/80   05/17/18 128/82   05/02/18 128/80   09/07/17 118/84

## 2019-02-18 NOTE — TELEPHONE ENCOUNTER
"Requested Prescriptions   Pending Prescriptions Disp Refills     losartan (COZAAR) 25 MG tablet [Pharmacy Med Name: LOSARTAN POTASSIUM 25MG TABS] 90 tablet 0     Sig: TAKE ONE TABLET BY MOUTH EVERY DAY    Angiotensin-II Receptors Passed - 2/18/2019  7:53 AM       Passed - Blood pressure under 140/90 in past 12 months    BP Readings from Last 3 Encounters:   12/06/18 135/83   11/09/18 160/86   09/28/18 152/80                Passed - Recent (12 mo) or future (30 days) visit within the authorizing provider's specialty    Patient had office visit in the last 12 months or has a visit in the next 30 days with authorizing provider or within the authorizing provider's specialty.  See \"Patient Info\" tab in inbasket, or \"Choose Columns\" in Meds & Orders section of the refill encounter.             Passed - Medication is active on med list       Passed - Patient is age 18 or older       Passed - Normal serum creatinine on file in past 12 months    Recent Labs   Lab Test 05/02/18  0755   CR 0.89            Passed - Normal serum potassium on file in past 12 months    Recent Labs   Lab Test 05/02/18  0755   POTASSIUM 4.4                    losartan (COZAAR) 25 MG tablet  Last Written Prescription Date:  10/22/18  Last Fill Quantity: 90,  # refills: 0   Last office visit: 9/28/2018 with prescribing provider:  Dr. Marroquin   Future Office Visit:      "

## 2019-03-07 ENCOUNTER — E-VISIT (OUTPATIENT)
Dept: FAMILY MEDICINE | Facility: CLINIC | Age: 59
End: 2019-03-07

## 2019-03-07 DIAGNOSIS — J06.9 VIRAL URI: Primary | ICD-10-CM

## 2019-03-07 DIAGNOSIS — J01.90 ACUTE SINUSITIS WITH SYMPTOMS > 10 DAYS: ICD-10-CM

## 2019-03-07 PROCEDURE — 99207 ZZC NO BILLABLE SERVICE THIS VISIT: CPT | Performed by: FAMILY MEDICINE

## 2019-03-07 RX ORDER — DOXYCYCLINE 100 MG/1
100 CAPSULE ORAL 2 TIMES DAILY
Qty: 14 CAPSULE | Refills: 0 | Status: SHIPPED | OUTPATIENT
Start: 2019-03-07 | End: 2019-04-03

## 2019-03-08 ENCOUNTER — E-VISIT (OUTPATIENT)
Dept: FAMILY MEDICINE | Facility: CLINIC | Age: 59
End: 2019-03-08
Payer: COMMERCIAL

## 2019-03-08 DIAGNOSIS — J06.0 SORE THROAT AND LARYNGITIS: ICD-10-CM

## 2019-03-08 DIAGNOSIS — J06.9 VIRAL URI: Primary | ICD-10-CM

## 2019-03-08 DIAGNOSIS — J01.90 ACUTE SINUSITIS WITH SYMPTOMS > 10 DAYS: ICD-10-CM

## 2019-03-08 PROCEDURE — 99444 ZZC PHYSICIAN ONLINE EVALUATION & MANAGEMENT SERVICE: CPT | Performed by: FAMILY MEDICINE

## 2019-03-11 ENCOUNTER — MYC MEDICAL ADVICE (OUTPATIENT)
Dept: FAMILY MEDICINE | Facility: CLINIC | Age: 59
End: 2019-03-11

## 2019-03-11 NOTE — TELEPHONE ENCOUNTER
Patient had an E-Visit within the last 7 days on 3/8/19 - routing to provider to please review and advise on Leverhart message.     India Pierce RN

## 2019-03-18 ENCOUNTER — E-VISIT (OUTPATIENT)
Dept: FAMILY MEDICINE | Facility: CLINIC | Age: 59
End: 2019-03-18

## 2019-03-18 ENCOUNTER — MYC MEDICAL ADVICE (OUTPATIENT)
Dept: FAMILY MEDICINE | Facility: CLINIC | Age: 59
End: 2019-03-18

## 2019-03-18 DIAGNOSIS — R49.1 LOSS OF VOICE: Primary | ICD-10-CM

## 2019-03-18 PROCEDURE — 99207 ZZC NON-BILLABLE SERV PER CHARTING: CPT | Performed by: FAMILY MEDICINE

## 2019-04-02 ENCOUNTER — E-VISIT (OUTPATIENT)
Dept: FAMILY MEDICINE | Facility: CLINIC | Age: 59
End: 2019-04-02
Payer: COMMERCIAL

## 2019-04-02 ENCOUNTER — ANCILLARY PROCEDURE (OUTPATIENT)
Dept: GENERAL RADIOLOGY | Facility: CLINIC | Age: 59
End: 2019-04-02
Attending: FAMILY MEDICINE
Payer: COMMERCIAL

## 2019-04-02 DIAGNOSIS — R05.9 COUGH: Primary | ICD-10-CM

## 2019-04-02 DIAGNOSIS — R05.9 COUGH: ICD-10-CM

## 2019-04-02 PROCEDURE — 99444 ZZC PHYSICIAN ONLINE EVALUATION & MANAGEMENT SERVICE: CPT | Performed by: FAMILY MEDICINE

## 2019-04-02 PROCEDURE — 71046 X-RAY EXAM CHEST 2 VIEWS: CPT | Mod: FY

## 2019-04-03 ENCOUNTER — TELEPHONE (OUTPATIENT)
Dept: FAMILY MEDICINE | Facility: CLINIC | Age: 59
End: 2019-04-03

## 2019-04-03 ENCOUNTER — OFFICE VISIT (OUTPATIENT)
Dept: FAMILY MEDICINE | Facility: CLINIC | Age: 59
End: 2019-04-03
Payer: COMMERCIAL

## 2019-04-03 ENCOUNTER — MYC MEDICAL ADVICE (OUTPATIENT)
Dept: FAMILY MEDICINE | Facility: CLINIC | Age: 59
End: 2019-04-03

## 2019-04-03 VITALS
WEIGHT: 175.1 LBS | BODY MASS INDEX: 25.93 KG/M2 | OXYGEN SATURATION: 97 % | TEMPERATURE: 99.5 F | DIASTOLIC BLOOD PRESSURE: 60 MMHG | HEIGHT: 69 IN | RESPIRATION RATE: 18 BRPM | HEART RATE: 111 BPM | SYSTOLIC BLOOD PRESSURE: 134 MMHG

## 2019-04-03 DIAGNOSIS — J10.1 INFLUENZA A: Primary | ICD-10-CM

## 2019-04-03 DIAGNOSIS — R68.89 FLU-LIKE SYMPTOMS: ICD-10-CM

## 2019-04-03 DIAGNOSIS — R05.9 COUGH: ICD-10-CM

## 2019-04-03 LAB
FLUAV+FLUBV AG SPEC QL: NEGATIVE
FLUAV+FLUBV AG SPEC QL: POSITIVE
SPECIMEN SOURCE: ABNORMAL

## 2019-04-03 PROCEDURE — 87804 INFLUENZA ASSAY W/OPTIC: CPT | Performed by: NURSE PRACTITIONER

## 2019-04-03 PROCEDURE — 99213 OFFICE O/P EST LOW 20 MIN: CPT | Performed by: NURSE PRACTITIONER

## 2019-04-03 RX ORDER — CODEINE PHOSPHATE AND GUAIFENESIN 10; 100 MG/5ML; MG/5ML
1-2 SOLUTION ORAL 2 TIMES DAILY PRN
Qty: 118 ML | Refills: 0 | Status: SHIPPED | OUTPATIENT
Start: 2019-04-03 | End: 2019-06-11

## 2019-04-03 RX ORDER — OSELTAMIVIR PHOSPHATE 75 MG/1
75 CAPSULE ORAL 2 TIMES DAILY
Qty: 10 CAPSULE | Refills: 0 | Status: SHIPPED | OUTPATIENT
Start: 2019-04-03 | End: 2019-06-11

## 2019-04-03 ASSESSMENT — MIFFLIN-ST. JEOR: SCORE: 1594.63

## 2019-04-03 ASSESSMENT — PAIN SCALES - GENERAL: PAINLEVEL: MILD PAIN (2)

## 2019-04-03 NOTE — TELEPHONE ENCOUNTER
Please call patient re:  Final result sent to him over SoPost.  Copied below.  Will route to provider seeing patient in clinic today.  PSK          Your xray is normal.  There is no pneumonia, fluid accumulation or other cause for cough symptoms.  Please call or MyCarehart message me if you have any questions.     PSK

## 2019-04-03 NOTE — TELEPHONE ENCOUNTER
Reason for Call:  Request for results:    Name of test or procedure: XR Chest 2 Views    Date of test of procedure: 04/02/19    Location of the test or procedure: [facility]    OK to leave the result message on voice mail or with a family member? YES    Phone number Patient can be reached at:  Cell number on file:    Telephone Information:   Mobile [phone]       Additional comments: Pt calling for he came in for a chest X-Ray yesterday and would like a call back to discuss results.      Call taken on 4/3/2019 at 8:04 AM by Amador Cerda

## 2019-04-03 NOTE — PROGRESS NOTES
SUBJECTIVE:   Gael Le is a 58 year old male who presents to clinic today for the following health issues:    Follow up URI/Chest xray    -Would like to know the next steps because still having fever and head pressure/congestion.  -Took the abx 2-3 weeks ago which did help but it came back a few days ago  -x-ray negative this week. Doxycycline completed march.     Has similar symptoms in march but it went way. Went to [place] and came back on Saturday, symptoms started up again on Sunday. Worked out last weekend. But now coughing more, poor sleep, was 100.4 at home, this morning 100. Took tylenol. Wife has similar symptoms   But more mild.    Got flu shot this year, still swabbed. No sore throat. Head is hurting from coughing. Laryngitis-had that a month ago but it only went away a week or two ago. Having malise.      As he is positive for the flu, he is wondering what his wife.  Advised she should come in and get tested, or she can do an E visit with provider if she wants.  She likely needs Tamiflu    Problem list and histories reviewed & adjusted, as indicated.  Additional history: as documented    Patient Active Problem List   Diagnosis     Rosacea     Anxiety state     Hyperlipidemia     Migraine     Supraventricular premature beats     Erectile dysfunction, unspecified erectile dysfunction type     Nontoxic multinodular goiter     Tinnitus     Advance Care Planning     Hearing deficit, bilateral     Acute bilateral low back pain with left-sided sciatica     Benign essential hypertension     Past Surgical History:   Procedure Laterality Date     COLONOSCOPY WITH CO2 INSUFFLATION N/A 11/21/2016    Procedure: COLONOSCOPY WITH CO2 INSUFFLATION;  Surgeon: Adeel Graf MD;  Location: [facility]     FINGER SURGERY       HERNIA REPAIR         Social History     Tobacco Use     Smoking status: Never Smoker     Smokeless tobacco: Never Used   Substance Use Topics     Alcohol use: Yes     Family History    Problem Relation Age of Onset     Dementia Mother         multiinfarct dementia     Diabetes Father      Cerebrovascular Disease Father      Skin Cancer Father         BCC     Myocardial Infarction Paternal Grandmother      Colon Cancer No family hx of      Prostate Cancer No family hx of      Asthma No family hx of          Current Outpatient Medications   Medication Sig Dispense Refill     ALPRAZolam (XANAX) 0.5 MG tablet TAKE 1 TABLET BY MOUTH THREE TIMES DAILY AS NEEDED FOR ANXIETY 30 tablet 0     atorvastatin (LIPITOR) 20 MG tablet Take 1 tablet (20 mg) by mouth daily 90 tablet 3     Avanafil 200 MG TABS Take 200 mg by mouth as needed (for intercourse) 6 tablet 3     FISH OIL        guaiFENesin-codeine (ROBITUSSIN AC) 100-10 MG/5ML solution Take 5-10 mLs by mouth 2 times daily as needed for cough 118 mL 0     losartan (COZAAR) 25 MG tablet TAKE ONE TABLET BY MOUTH EVERY DAY 90 tablet 0     Multiple Vitamin (MULTI-VITAMIN DAILY PO) Take  by mouth.       oseltamivir (TAMIFLU) 75 MG capsule Take 1 capsule (75 mg) by mouth 2 times daily for 5 days 10 capsule 0     tadalafil (CIALIS) 5 MG tablet Take 1 tablet (5 mg) by mouth daily 30 tablet 5     tamsulosin (FLOMAX) 0.4 MG capsule Take 1 capsule (0.4 mg) by mouth daily (Patient not taking: Reported on 11/9/2018) 30 capsule 1     Allergies   Allergen Reactions     Amoxicillin Hives     Lisinopril-Hydrochlorothiazide Cough     Penicillins Hives       Reviewed and updated as needed this visit by clinical staff  Tobacco  Allergies  Meds  Problems  Med Hx  Surg Hx  Fam Hx  Soc Hx        Reviewed and updated as needed this visit by Provider  Tobacco  Allergies  Meds  Problems  Med Hx  Surg Hx  Fam Hx         ROS:  Constitutional, HEENT, cardiovascular, pulmonary, gi and gu systems are negative, except as otherwise noted.    OBJECTIVE:     /60 (BP Location: Right arm, Patient Position: Sitting, Cuff Size: Adult Regular)   Pulse 111   Temp 99.5  F  "(37.5  C) (Oral)   Resp 18   Ht 1.753 m (5' 9\")   Wt 79.4 kg (175 lb 1.6 oz)   SpO2 97%   BMI 25.86 kg/m    Body mass index is 25.86 kg/m .  GENERAL: tired appearing, alert and no distress  EYES: Eyes grossly normal to inspection, PERRL and conjunctivae and sclerae normal  HENT: ear canals and TM's normal, nose and mouth without ulcers or lesions  NECK: no adenopathy, no asymmetry, masses, or scars and thyroid normal to palpation  RESP: lungs clear to auscultation - no rales, rhonchi or wheezes-harsh cough noted during visit  CV: regular rate and rhythm, normal S1 S2, no S3 or S4, no murmur, click or rub  ABDOMEN: soft, nontender, no hepatosplenomegaly, no masses and bowel sounds normal  MS: no gross musculoskeletal defects noted    Diagnostic Test Results:  Results for orders placed or performed in visit on 04/03/19 (from the past 24 hour(s))   Influenza A/B antigen   Result Value Ref Range    Influenza A/B Agn Specimen Nasal     Influenza A Positive (A) NEG^Negative    Influenza B Negative NEG^Negative       ASSESSMENT/PLAN:         1. Influenza A  Difficult to say if he had a flu last month or if he contracted it on the plane this weekend.  Will start Tamiflu.  If not improving by 24-48 hours, patient can let provider know, we could then trial azithromycin also for possible other bacterial infection.  He has cough pills at home he can use, will also send was sent with AC.  Advised no driving.  - oseltamivir (TAMIFLU) 75 MG capsule; Take 1 capsule (75 mg) by mouth 2 times daily for 5 days  Dispense: 10 capsule; Refill: 0    2. Flu-like symptoms  Positive for the flu.  Advised his wife should likely be swabbed.  Or she could do in E visit or phone visit with provider and get Tamiflu prescribed.  - Influenza A/B antigen    3. Cough  As above do not drive on this.  Patient verbalized understanding  - guaiFENesin-codeine (ROBITUSSIN AC) 100-10 MG/5ML solution; Take 5-10 mLs by mouth 2 times daily as needed for " cough  Dispense: 118 mL; Refill: 0    FUTURE APPOINTMENTS:       - Follow-up visit in 7 days as needed    KATH Herzog, NP-C  [facility]    This chart was documented by provider using a voice activated software called Dragon in addition to manual typing. There may be vocabulary errors or other grammatical errors due to this.

## 2019-04-12 ENCOUNTER — THERAPY VISIT (OUTPATIENT)
Dept: PHYSICAL THERAPY | Facility: CLINIC | Age: 59
End: 2019-04-12
Payer: COMMERCIAL

## 2019-04-12 ENCOUNTER — OFFICE VISIT (OUTPATIENT)
Dept: FAMILY MEDICINE | Facility: CLINIC | Age: 59
End: 2019-04-12
Payer: COMMERCIAL

## 2019-04-12 VITALS
HEART RATE: 96 BPM | DIASTOLIC BLOOD PRESSURE: 84 MMHG | HEIGHT: 69 IN | TEMPERATURE: 98 F | OXYGEN SATURATION: 96 % | BODY MASS INDEX: 25.92 KG/M2 | SYSTOLIC BLOOD PRESSURE: 132 MMHG | WEIGHT: 175 LBS

## 2019-04-12 DIAGNOSIS — M54.50 BILATERAL LOW BACK PAIN WITHOUT SCIATICA: ICD-10-CM

## 2019-04-12 DIAGNOSIS — M54.50 ACUTE BILATERAL LOW BACK PAIN WITHOUT SCIATICA: Primary | ICD-10-CM

## 2019-04-12 PROCEDURE — 99214 OFFICE O/P EST MOD 30 MIN: CPT | Performed by: FAMILY MEDICINE

## 2019-04-12 PROCEDURE — 97161 PT EVAL LOW COMPLEX 20 MIN: CPT | Mod: GP | Performed by: PHYSICAL THERAPIST

## 2019-04-12 PROCEDURE — 97110 THERAPEUTIC EXERCISES: CPT | Mod: GP | Performed by: PHYSICAL THERAPIST

## 2019-04-12 PROCEDURE — 97140 MANUAL THERAPY 1/> REGIONS: CPT | Mod: GP | Performed by: PHYSICAL THERAPIST

## 2019-04-12 RX ORDER — TIZANIDINE 2 MG/1
2 TABLET ORAL 3 TIMES DAILY PRN
Qty: 20 TABLET | Refills: 0 | Status: SHIPPED | OUTPATIENT
Start: 2019-04-12 | End: 2019-06-11

## 2019-04-12 RX ORDER — METHYLPREDNISOLONE 4 MG
TABLET, DOSE PACK ORAL
Qty: 21 TABLET | Refills: 0 | Status: SHIPPED | OUTPATIENT
Start: 2019-04-12 | End: 2019-06-11

## 2019-04-12 RX ORDER — HYDROCODONE BITARTRATE AND ACETAMINOPHEN 5; 325 MG/1; MG/1
1 TABLET ORAL EVERY 6 HOURS PRN
Qty: 12 TABLET | Refills: 0 | Status: SHIPPED | OUTPATIENT
Start: 2019-04-12 | End: 2019-06-11

## 2019-04-12 ASSESSMENT — MIFFLIN-ST. JEOR: SCORE: 1594.17

## 2019-04-12 NOTE — PROGRESS NOTES
[facility] Initial Evaluation  Subjective:  The history is provided by the patient. No  was used.   Gael Le is a 58 year old male with a lumbar condition.  Condition occurred with:  Other reason and a jump (coughing with cold/flu,carrying grandson).    This is a recurrent condition  Reports chronic LBP since 2016 with recurrent episode of LBP on 4/10/19. He carried his grandson around the [facility] which aggravated his low back and was constipated, having to bear down to have BM which also aggravated his back.He had the influenza in 2/2019 with a lot of coughing and feels this also aggravated his LBP. He had PT in 2018 and exercises were very helpful. Patient is currently self referring to PT    .    Patient reports pain:  Lower lumbar spine.    Pain is described as sharp and is constant and reported as 7/10.   Pain is the same all the time.  Symptoms are exacerbated by bending, lifting, standing and carrying and relieved by NSAID's.  Since onset symptoms are gradually worsening.    Previous treatment includes physical therapy.  There was significant improvement following previous treatment.  General health as reported by patient is excellent.  Pertinent medical history includes:  Asthma, high blood pressure and migraines/headaches.  Medical allergies: yes (shahriar ampecillan).  Other surgeries include:  No.  Current medications:  High blood pressure medication and anti-inflammatory.  Current occupation .  Patient is working in normal job without restrictions.  Primary job tasks include:  Prolonged sitting.    Barriers include:  None as reported by the patient.    Red flags:  None as reported by the patient.                        Objective:    Gait:      Deviations:  Lumbar:  Trunk flexion    Flexibility/Screens:       Lower Extremity:  Decreased left lower extremity flexibility:Piriformis; Quadriceps and Hamstrings    Decreased right lower  extremity flexibility:  Piriformis; Quadriceps and Hamstrings               Lumbar/SI Evaluation  ROM:    AROM Lumbar:   Flexion:          Darío limitation increased LBP during mvmt  Ext:                    Mod limitation mild LBP during mvmt    Side Bend:        Left:  Mod limitation increased L LBP    Right:  Min limitation no affect   Rotation:           Left:     Right:   Side Glide:        Left:     Right:         Strength: fair sitting posture  Lumbar Myotomes:  normal                  Neural Tension/Mobility:  Lumbar:  Normal        Lumbar Palpation:    Tenderness present at Left:    Quadratus Lumborum and Erector Spinae  Tenderness present at Right: Quadratus Lumborum and Erector Spinae                                                       Porfirio Lumbar Evaluation        Test Movements:        EIL: During: increases  After: worse  Mechanical Response: no effect        Static Tests:          Lying Prone in Extension: increases, no worse                                             ROS    Assessment/Plan:    Patient is a 58 year old male with lumbar complaints.    Patient has the following significant findings with corresponding treatment plan.                Diagnosis 1:  LBP  Pain -  hot/cold therapy, manual therapy, self management, education, directional preference exercise and home program  Decreased ROM/flexibility - manual therapy, therapeutic exercise and home program  Impaired muscle performance - neuro re-education and home program  Decreased function - therapeutic activities and home program  Impaired posture - neuro re-education and home program    Therapy Evaluation Codes:   1) History comprised of:   Personal factors that impact the plan of care:      None.    Comorbidity factors that impact the plan of care are:      None.     Medications impacting care: None.  2) Examination of Body Systems comprised of:   Body structures and functions that impact the plan of care:      Lumbar spine.   Activity  limitations that impact the plan of care are:      Bathing, Bending, Dressing, Lifting, Running, Sitting and Standing.  3) Clinical presentation characteristics are:   Stable/Uncomplicated.  4) Decision-Making    Low complexity using standardized patient assessment instrument and/or measureable assessment of functional outcome.  Cumulative Therapy Evaluation is: Low complexity.    Previous and current functional limitations:  (See Goal Flow Sheet for this information)    Short term and Long term goals: (See Goal Flow Sheet for this information)     Communication ability:  Patient appears to be able to clearly communicate and understand verbal and written communication and follow directions correctly.  Treatment Explanation - The following has been discussed with the patient:   RX ordered/plan of care  Anticipated outcomes  Possible risks and side effects  This patient would benefit from PT intervention to resume normal activities.   Rehab potential is good.    Frequency:  1 X week, once daily  Duration:  for 6 weeks  Discharge Plan:  Achieve all LTG.  Independent in home treatment program.  Reach maximal therapeutic benefit.      Please refer to the daily flowsheet for treatment today, total treatment time and time spent performing 1:1 timed codes.

## 2019-04-12 NOTE — PROGRESS NOTES
"  SUBJECTIVE:   Gael Le is a 58 year old male who presents to clinic today for the following   health issues:      Back Pain Follow Up      Description:   Location of pain:  Lower bilateral  Character of pain: sharp  Pain radiation: Does not radiate  Since last visit, pain is:  worsened  New numbness or weakness in legs, not attributed to pain:  no     Intensity: Currently 5/10    History:   Pain interferes with job: YES, hard to concentrate   Therapies tried without relief: Exercise from pervious Physical Therapy   Therapies tried with relief: Patient started new Physical Therapy     SUBJECTIVE:  Here today for back pain as above.  Patient has had some issues with recurrent low back in the past and has been through physical therapy.  No specific onset to the current flareup but seems to be related to recent illnesses including documented influenza and a previous cold.  He first started noticing after carrying a grandchild around a bit but it was not a specific injury.  Pain is across his low back without a lot of radiation.  No bowel or bladder symptoms or any neurologic symptoms.  Typically this works itself out with stretching and exercise but this time it just seems to be very tight.  Had an appointment with physical therapist this morning and may have been going on some exercises.  Tried some leftover Flexeril at home without much help.  Ibuprofen helps perhaps a little.    Review of systems otherwise negative.  Past medical, family, and social history reviewed and updated in chart.    OBJECTIVE:  /84 (BP Location: Right arm, Patient Position: Chair, Cuff Size: Adult Regular)   Pulse 96   Temp 98  F (36.7  C) (Oral)   Ht 1.753 m (5' 9\")   Wt 79.4 kg (175 lb)   SpO2 96%   BMI 25.84 kg/m    Alert and pleasant.  Obviously uncomfortable trying to stand up and very poor extension of his back  S1 and S2 normal, no murmurs, clicks, gallops or rubs. Regular rate and rhythm. Chest is clear; no wheezes " or rales. No edema or JVD.  Back -slow and hesitant to move in most directions, especially extension.  Straight leg raising negative bilaterally and normal muscle tone throughout lower extremities  Past labs reviewed with the patient.     ASSESSMENT / PLAN:  (M54.5) Acute bilateral low back pain without sciatica  (primary encounter diagnosis)  Comment: Seems consistent with a mechanical issue and he will continue with the physical therapy.  We will try to settle down some methylprednisolone and use some Zanaflex as needed.  Plan: methylPREDNISolone (MEDROL DOSEPAK) 4 MG tablet        therapy pack, tiZANidine (ZANAFLEX) 2 MG         tablet, HYDROcodone-acetaminophen (NORCO) 5-325        MG tablet        Discussed mechanism of action of the proposed medication, as well as potential effects, both good and bad.  Patient expressed understanding and agreed with treatment.     Follow up 1 week as needed  SWillie Patrick MD    (Chart documentation completed in part with Dragon voice-recognition software.  Even though reviewed some grammatical, spelling, and word errors may remain.)

## 2019-04-15 ENCOUNTER — THERAPY VISIT (OUTPATIENT)
Dept: PHYSICAL THERAPY | Facility: CLINIC | Age: 59
End: 2019-04-15
Payer: COMMERCIAL

## 2019-04-15 DIAGNOSIS — M54.50 BILATERAL LOW BACK PAIN WITHOUT SCIATICA: ICD-10-CM

## 2019-04-15 PROCEDURE — 97140 MANUAL THERAPY 1/> REGIONS: CPT | Mod: GP | Performed by: PHYSICAL THERAPIST

## 2019-04-15 PROCEDURE — 97110 THERAPEUTIC EXERCISES: CPT | Mod: GP | Performed by: PHYSICAL THERAPIST

## 2019-04-15 PROCEDURE — 97530 THERAPEUTIC ACTIVITIES: CPT | Mod: GP | Performed by: PHYSICAL THERAPIST

## 2019-04-29 ENCOUNTER — THERAPY VISIT (OUTPATIENT)
Dept: PHYSICAL THERAPY | Facility: CLINIC | Age: 59
End: 2019-04-29
Payer: COMMERCIAL

## 2019-04-29 DIAGNOSIS — M54.50 BILATERAL LOW BACK PAIN WITHOUT SCIATICA: ICD-10-CM

## 2019-04-29 PROCEDURE — 97140 MANUAL THERAPY 1/> REGIONS: CPT | Mod: GP | Performed by: PHYSICAL THERAPIST

## 2019-04-29 PROCEDURE — 97110 THERAPEUTIC EXERCISES: CPT | Mod: GP | Performed by: PHYSICAL THERAPIST

## 2019-04-29 NOTE — PROGRESS NOTES
Subjective:  HPI                    Objective:  System    Physical Exam    General     ROS    Assessment/Plan:    SUBJECTIVE  Subjective changes as noted by pt:  My back is feeling really good and the HEP is going well. I started back running, 2x in past week for 15-20'- no pain. I did have 2 episodes of sciatica- 1 at night and 1 at work sitting- ibuprofin helps reduce the pain.    Current pain level: 0/10     Changes in function:  Yes (See Goal flowsheet attached for changes in current functional level)     Adverse reaction to treatment or activity:  None    OBJECTIVE  Changes in objective findings:  Yes, LROM: FLX hands to knees pain free EXT  Min loss pain free  Press ups: no affect during mvmt or after, increased ROM  Min mm tension /PT B L paraspinals/QL  Instruct in core/ glut strengthening- shari well              ASSESSMENT  Gael continues to require intervention to meet STG and LTG's: PT  Patient's symptoms are resolving.  Patient is progressing as expected.  Patient is becoming more independent in home exercise program  Response to therapy has shown an improvement in  pain level, ROM , flexibility, muscle control, posture and function  Progress made towards STG/LTG?  Yes (See Goal flowsheet attached for updates on achievement of STG and LTG)    PLAN  Current treatment program is being advanced to more complex exercises.  The following procedures have been added:  strengthening    PTA/ATC plan:  N/A    Please refer to the daily flowsheet for treatment today, total treatment time and time spent performing 1:1 timed codes.

## 2019-05-16 DIAGNOSIS — N52.9 ERECTILE DYSFUNCTION, UNSPECIFIED ERECTILE DYSFUNCTION TYPE: ICD-10-CM

## 2019-05-16 RX ORDER — TADALAFIL 5 MG/1
TABLET ORAL
Qty: 30 TABLET | Refills: 1 | Status: SHIPPED | OUTPATIENT
Start: 2019-05-16 | End: 2019-07-22

## 2019-05-20 ENCOUNTER — THERAPY VISIT (OUTPATIENT)
Dept: PHYSICAL THERAPY | Facility: CLINIC | Age: 59
End: 2019-05-20
Payer: COMMERCIAL

## 2019-05-20 DIAGNOSIS — M54.50 BILATERAL LOW BACK PAIN WITHOUT SCIATICA: ICD-10-CM

## 2019-05-20 PROCEDURE — 97140 MANUAL THERAPY 1/> REGIONS: CPT | Mod: GP | Performed by: PHYSICAL THERAPIST

## 2019-05-20 PROCEDURE — 97110 THERAPEUTIC EXERCISES: CPT | Mod: GP | Performed by: PHYSICAL THERAPIST

## 2019-05-20 NOTE — PROGRESS NOTES
"Subjective:  HPI                    Objective:  System    Physical Exam    General     ROS    Assessment/Plan:    SUBJECTIVE  Subjective changes as noted by pt:  I carried my grandson a far distance without back pain. I'm running 30' 2-3x week- no back pain.I had 1 episodes of  \"sciatica\" in both hamstrings when sitting in a lawn chair. The pain lasted for a day then went away. HEP is going good.   Current pain level: 0/10     Changes in function:  Yes (See Goal flowsheet attached for changes in current functional level)     Adverse reaction to treatment or activity:  None    OBJECTIVE  Changes in objective findings:  Yes, LROM: FLX hands to knees- pain free EXT min loss pain free  Press ups: no affect during mvmt or after, increased ROM(added exhale)  No PT/mm tension paraspinals  Tight hip flx/quads- added stretch prone  SLR B 30 degrees(tight hamstrings)- no LBP- continue to stretch            ASSESSMENT  Gael continues to require intervention to meet STG and LTG's: PT  Patient's symptoms are resolving.  Patient is progressing as expected.  Patient is becoming more independent in home exercise program  Response to therapy has shown an improvement in  pain level, radicular symptoms, ROM , muscle control, posture and function  Progress made towards STG/LTG?  Yes (See Goal flowsheet attached for updates on achievement of STG and LTG)    PLAN  Current treatment program is being advanced to more complex exercises.  The following procedures have been added:  stretching and strengthening    Patient will continue HEP and Follow-up in PT if needed in next 1-2 months.    PTA/ATC plan:  N/A    Please refer to the daily flowsheet for treatment today, total treatment time and time spent performing 1:1 timed codes.    DISCHARGE REPORT    Progress reporting period is from 4/12/19  to 5/20/19.     SUBJECTIVE                   ;   ,     The subjective and objective information are from the last SOAP note on this " patient.    OBJECTIVE         ASSESSMENT/PLAN  Updated problem list and treatment plan: Diagnosis 1:  LBP    STG/LTGs have been met or progress has been made towards goals:  Yes (See Goal flow sheet completed today.)  Assessment of Progress: Patient is meeting short term goals and is progressing towards long term goals.  Self Management Plans:  Patient is independent in a home treatment program.  Patient is independent in self management of symptoms.    Gael continues to require the following intervention to meet STG and LTG's: PT  We will discharge this patient from PT.    Recommendations:  This patient is ready to be discharged from therapy and continue their home treatment program.    Please refer to the daily flowsheet for treatment today, total treatment time and time spent performing 1:1 timed codes.

## 2019-05-29 DIAGNOSIS — I10 BENIGN ESSENTIAL HYPERTENSION: ICD-10-CM

## 2019-05-29 NOTE — TELEPHONE ENCOUNTER
"Requested Prescriptions   Pending Prescriptions Disp Refills     losartan (COZAAR) 25 MG tablet 90 tablet 0     Sig: Take 1 tablet (25 mg) by mouth daily       Angiotensin-II Receptors Failed - 5/29/2019  1:19 PM        Failed - Normal serum creatinine on file in past 12 months     Recent Labs   Lab Test 05/02/18  0755   CR 0.89             Failed - Normal serum potassium on file in past 12 months     Recent Labs   Lab Test 05/02/18  0755   POTASSIUM 4.4                    Passed - Blood pressure under 140/90 in past 12 months     BP Readings from Last 3 Encounters:   04/12/19 132/84   04/03/19 134/60   12/06/18 135/83                 Passed - Recent (12 mo) or future (30 days) visit within the authorizing provider's specialty     Patient had office visit in the last 12 months or has a visit in the next 30 days with authorizing provider or within the authorizing provider's specialty.  See \"Patient Info\" tab in inbasket, or \"Choose Columns\" in Meds & Orders section of the refill encounter.              Passed - Medication is active on med list        Passed - Patient is age 18 or older        losartan (COZAAR) 25 MG tablet  Last Written Prescription Date:  2/18/19  Last Fill Quantity: 90,  # refills: 0   Last office visit: 4/12/2019 with prescribing provider:  Dr. Marroquin   Future Office Visit:   Next 5 appointments (look out 90 days)    Jun 11, 2019  8:20 AM CDT  PHYSICAL with [facility]quin [facility]  [facility] [address] [place] [id number] [phone]           "

## 2019-05-31 NOTE — TELEPHONE ENCOUNTER
Routing refill request to provider for review/approval because:  Labs not current:  KPrasanna Hector RN

## 2019-06-01 RX ORDER — LOSARTAN POTASSIUM 25 MG/1
25 TABLET ORAL DAILY
Qty: 90 TABLET | Refills: 0 | Status: SHIPPED | OUTPATIENT
Start: 2019-06-01 | End: 2019-06-11

## 2019-06-11 ENCOUNTER — OFFICE VISIT (OUTPATIENT)
Dept: FAMILY MEDICINE | Facility: CLINIC | Age: 59
End: 2019-06-11
Payer: COMMERCIAL

## 2019-06-11 ENCOUNTER — MYC MEDICAL ADVICE (OUTPATIENT)
Dept: FAMILY MEDICINE | Facility: CLINIC | Age: 59
End: 2019-06-11

## 2019-06-11 VITALS
TEMPERATURE: 97.2 F | HEART RATE: 66 BPM | SYSTOLIC BLOOD PRESSURE: 116 MMHG | BODY MASS INDEX: 26.5 KG/M2 | WEIGHT: 178.9 LBS | HEIGHT: 69 IN | OXYGEN SATURATION: 99 % | DIASTOLIC BLOOD PRESSURE: 74 MMHG

## 2019-06-11 DIAGNOSIS — Z11.4 ENCOUNTER FOR SCREENING FOR HIV: ICD-10-CM

## 2019-06-11 DIAGNOSIS — Z13.1 SCREENING FOR DIABETES MELLITUS: ICD-10-CM

## 2019-06-11 DIAGNOSIS — Z00.01 ENCOUNTER FOR GENERAL ADULT MEDICAL EXAMINATION WITH ABNORMAL FINDINGS: Primary | ICD-10-CM

## 2019-06-11 DIAGNOSIS — Z23 NEED FOR MMR VACCINE: ICD-10-CM

## 2019-06-11 DIAGNOSIS — I10 BENIGN ESSENTIAL HYPERTENSION: ICD-10-CM

## 2019-06-11 DIAGNOSIS — E78.2 MIXED HYPERLIPIDEMIA: ICD-10-CM

## 2019-06-11 DIAGNOSIS — K21.9 GASTROESOPHAGEAL REFLUX DISEASE WITHOUT ESOPHAGITIS: ICD-10-CM

## 2019-06-11 DIAGNOSIS — Z12.5 SCREENING FOR PROSTATE CANCER: ICD-10-CM

## 2019-06-11 DIAGNOSIS — J30.1 ALLERGIC RHINITIS DUE TO POLLEN, UNSPECIFIED SEASONALITY: ICD-10-CM

## 2019-06-11 DIAGNOSIS — R05.9 COUGH: ICD-10-CM

## 2019-06-11 LAB
ALBUMIN SERPL-MCNC: 4.1 G/DL (ref 3.4–5)
ALP SERPL-CCNC: 78 U/L (ref 40–150)
ALT SERPL W P-5'-P-CCNC: 36 U/L (ref 0–70)
ANION GAP SERPL CALCULATED.3IONS-SCNC: 6 MMOL/L (ref 3–14)
AST SERPL W P-5'-P-CCNC: 27 U/L (ref 0–45)
BILIRUB SERPL-MCNC: 1.1 MG/DL (ref 0.2–1.3)
BUN SERPL-MCNC: 17 MG/DL (ref 7–30)
CALCIUM SERPL-MCNC: 9.1 MG/DL (ref 8.5–10.1)
CHLORIDE SERPL-SCNC: 107 MMOL/L (ref 94–109)
CHOLEST SERPL-MCNC: 201 MG/DL
CO2 SERPL-SCNC: 27 MMOL/L (ref 20–32)
CREAT SERPL-MCNC: 0.87 MG/DL (ref 0.66–1.25)
ERYTHROCYTE [DISTWIDTH] IN BLOOD BY AUTOMATED COUNT: 13.7 % (ref 10–15)
GFR SERPL CREATININE-BSD FRML MDRD: >90 ML/MIN/{1.73_M2}
GLUCOSE SERPL-MCNC: 103 MG/DL (ref 70–99)
HCT VFR BLD AUTO: 44.7 % (ref 40–53)
HDLC SERPL-MCNC: 55 MG/DL
HGB BLD-MCNC: 15.5 G/DL (ref 13.3–17.7)
LDLC SERPL CALC-MCNC: 115 MG/DL
MCH RBC QN AUTO: 31.5 PG (ref 26.5–33)
MCHC RBC AUTO-ENTMCNC: 34.7 G/DL (ref 31.5–36.5)
MCV RBC AUTO: 91 FL (ref 78–100)
NONHDLC SERPL-MCNC: 146 MG/DL
PLATELET # BLD AUTO: 168 10E9/L (ref 150–450)
POTASSIUM SERPL-SCNC: 4.3 MMOL/L (ref 3.4–5.3)
PROT SERPL-MCNC: 7.3 G/DL (ref 6.8–8.8)
PSA SERPL-ACNC: 0.64 UG/L (ref 0–4)
RBC # BLD AUTO: 4.92 10E12/L (ref 4.4–5.9)
SODIUM SERPL-SCNC: 140 MMOL/L (ref 133–144)
TRIGL SERPL-MCNC: 157 MG/DL
WBC # BLD AUTO: 5.8 10E9/L (ref 4–11)

## 2019-06-11 PROCEDURE — 90707 MMR VACCINE SC: CPT | Performed by: FAMILY MEDICINE

## 2019-06-11 PROCEDURE — 90471 IMMUNIZATION ADMIN: CPT | Performed by: FAMILY MEDICINE

## 2019-06-11 PROCEDURE — G0103 PSA SCREENING: HCPCS | Performed by: FAMILY MEDICINE

## 2019-06-11 PROCEDURE — 36415 COLL VENOUS BLD VENIPUNCTURE: CPT | Performed by: FAMILY MEDICINE

## 2019-06-11 PROCEDURE — 80061 LIPID PANEL: CPT | Performed by: FAMILY MEDICINE

## 2019-06-11 PROCEDURE — 99213 OFFICE O/P EST LOW 20 MIN: CPT | Mod: 25 | Performed by: FAMILY MEDICINE

## 2019-06-11 PROCEDURE — 87389 HIV-1 AG W/HIV-1&-2 AB AG IA: CPT | Performed by: FAMILY MEDICINE

## 2019-06-11 PROCEDURE — 99396 PREV VISIT EST AGE 40-64: CPT | Mod: 25 | Performed by: FAMILY MEDICINE

## 2019-06-11 PROCEDURE — 80053 COMPREHEN METABOLIC PANEL: CPT | Performed by: FAMILY MEDICINE

## 2019-06-11 PROCEDURE — 85027 COMPLETE CBC AUTOMATED: CPT | Performed by: FAMILY MEDICINE

## 2019-06-11 RX ORDER — ATORVASTATIN CALCIUM 20 MG/1
20 TABLET, FILM COATED ORAL DAILY
Qty: 90 TABLET | Refills: 3 | Status: CANCELLED | OUTPATIENT
Start: 2019-06-11

## 2019-06-11 RX ORDER — LOSARTAN POTASSIUM 25 MG/1
25 TABLET ORAL DAILY
Qty: 90 TABLET | Refills: 3 | Status: SHIPPED | OUTPATIENT
Start: 2019-06-11 | End: 2020-06-01 | Stop reason: DRUGHIGH

## 2019-06-11 RX ORDER — LOSARTAN POTASSIUM 25 MG/1
25 TABLET ORAL DAILY
Qty: 90 TABLET | Refills: 3 | Status: CANCELLED | OUTPATIENT
Start: 2019-06-11

## 2019-06-11 RX ORDER — ATORVASTATIN CALCIUM 20 MG/1
20 TABLET, FILM COATED ORAL DAILY
Qty: 90 TABLET | Refills: 3 | Status: SHIPPED | OUTPATIENT
Start: 2019-06-11 | End: 2020-07-13

## 2019-06-11 ASSESSMENT — PAIN SCALES - GENERAL: PAINLEVEL: NO PAIN (0)

## 2019-06-11 ASSESSMENT — MIFFLIN-ST. JEOR: SCORE: 1611.87

## 2019-06-11 NOTE — PATIENT INSTRUCTIONS
Fasting labs today. Refills will be updated when results return.    For the sporadic cough symptoms, you can use claritin (loratidine) 10 mg OR zyrtec (cetrizine) 10 mg OR allegra (Fexofenadine) 180 mg. Each of these are dosed once daily in the AM.  It is ok to use the benadryl at night if needed for the cough at that time as well.      For the heartburn symptoms, you can use Zantac (ranitidine)  mg daily at the time of symptoms or prior to eating foods that cause symptoms.  Pepcid (famotidine) 20 mg is another option.      MMR vaccine today    Preventive Health Recommendations  Male Ages 50 - 64    Yearly exam:             See your health care provider every year in order to  o   Review health changes.   o   Discuss preventive care.    o   Review your medicines if your doctor has prescribed any.     Have a cholesterol test every 5 years, or more frequently if you are at risk for high cholesterol/heart disease.     Have a diabetes test (fasting glucose) every three years. If you are at risk for diabetes, you should have this test more often.     Have a colonoscopy at age 50, or have a yearly FIT test (stool test). These exams will check for colon cancer.      Talk with your health care provider about whether or not a prostate cancer screening test (PSA) is right for you.    You should be tested each year for STDs (sexually transmitted diseases), if you re at risk.     Shots: Get a flu shot each year. Get a tetanus shot every 10 years.     Nutrition:    Eat at least 5 servings of fruits and vegetables daily.     Eat whole-grain bread, whole-wheat pasta and brown rice instead of white grains and rice.     Get adequate Calcium and Vitamin D.     Lifestyle    Exercise for at least 150 minutes a week (30 minutes a day, 5 days a week). This will help you control your weight and prevent disease.     Limit alcohol to one drink per day.     No smoking.     Wear sunscreen to prevent skin cancer.     See your dentist  every six months for an exam and cleaning.     See your eye doctor every 1 to 2 years.

## 2019-06-11 NOTE — NURSING NOTE
Screening Questionnaire for Adult Immunization    Are you sick today?   No   Do you have allergies to medications, food, a vaccine component or latex?   No   Have you ever had a serious reaction after receiving a vaccination?   No   Do you have a long-term health problem with heart disease, lung disease, asthma, kidney disease, metabolic disease (e.g. diabetes), anemia, or other blood disorder?   No   Do you have cancer, leukemia, HIV/AIDS, or any other immune system problem?   No   In the past 3 months, have you taken medications that affect  your immune system, such as prednisone, other steroids, or anticancer drugs; drugs for the treatment of rheumatoid arthritis, Crohn s disease, or psoriasis; or have you had radiation treatments?   No   Have you had a seizure, or a brain or other nervous system problem?   No   During the past year, have you received a transfusion of blood or blood     products, or been given immune (gamma) globulin or antiviral drug?   No   For women: Are you pregnant or is there a chance you could become        pregnant during the next month?   No   Have you received any vaccinations in the past 4 weeks?   No     Immunization questionnaire answers were all negative.        Patient instructed to remain in clinic for 15 minutes afterwards, and to report any adverse reaction to me immediately.       Screening performed by Torie Leyva on 6/11/2019 at 9:19 AM.

## 2019-06-11 NOTE — PROGRESS NOTES
SUBJECTIVE:   CC: Gael Le is an 58 year old male who presents for preventive health visit.     Healthy Habits:    Do you get at least three servings of calcium containing foods daily (dairy, green leafy vegetables, etc.)? yes    Amount of exercise or daily activities, outside of work: 3 day(s) per week, 30 mins cardio    Problems taking medications regularly No    Medication side effects: No    Have you had an eye exam in the past two years? yes    Do you see a dentist twice per year? yes    Do you have sleep apnea, excessive snoring or daytime drowsiness? Yes, snores      Concern - Possible Allergies  Onset: ongoing for months    Description:   Coughing and sore throat for the last couple of weeks. Waking up in the middle of the night coughing.     Intensity: mild, moderate    Progression of Symptoms:  same       Therapies Tried and outcome: Taking Benadryl works     Middle of night coughing, unable to sleep - benadryl works.    Cough during day - Gradual improving cough since flu.  1-2 coughs every 2 hours.  Will cough thicker phlegm in back of throat.  No SOB. No CP.  Has not tried antihistamine.  Did take fluticasone which has worked in past but use of the triggers PAC's that are symptomatic for him so he does not like to use it.        Today's PHQ-2 Score:   PHQ-2 ( 1999 Pfizer) 6/11/2019 5/13/2018   Q1: Little interest or pleasure in doing things 0 0   Q2: Feeling down, depressed or hopeless 0 0   PHQ-2 Score 0 0   Q1: Little interest or pleasure in doing things - Not at all   Q2: Feeling down, depressed or hopeless - Not at all   PHQ-2 Score - 0       Abuse: Current or Past(Physical, Sexual or Emotional)- No  Do you feel safe in your environment? Yes    Social History     Tobacco Use     Smoking status: Never Smoker     Smokeless tobacco: Never Used   Substance Use Topics     Alcohol use: Yes     If you drink alcohol do you typically have >3 drinks per day or >7 drinks per week? No                     "  Last PSA:   PSA   Date Value Ref Range Status   09/01/2017 0.85 0 - 4 ug/L Final     Comment:     Assay Method:  Chemiluminescence using Siemens Vista analyzer       Reviewed orders with patient. Reviewed health maintenance and updated orders accordingly - Yes  BP Readings from Last 3 Encounters:   06/11/19 116/74   04/12/19 132/84   04/03/19 134/60    Wt Readings from Last 3 Encounters:   06/11/19 81.1 kg (178 lb 14.4 oz)   04/12/19 79.4 kg (175 lb)   04/03/19 79.4 kg (175 lb 1.6 oz)                    Reviewed and updated as needed this visit by clinical staff  Tobacco  Allergies  Meds  Med Hx  Surg Hx  Fam Hx  Soc Hx        Reviewed and updated as needed this visit by Provider  Tobacco  Allergies  Meds  Med Hx  Surg Hx  Fam Hx  Soc Hx       Past Medical History:   Diagnosis Date     Hypertension       Past Surgical History:   Procedure Laterality Date     COLONOSCOPY WITH CO2 INSUFFLATION N/A 11/21/2016    Procedure: COLONOSCOPY WITH CO2 INSUFFLATION;  Surgeon: Adeel Graf MD;  Location: [facility] OR     FINGER SURGERY       HERNIA REPAIR         ROS:  10 point ROS of systems including Constitutional, Eyes, Respiratory, Cardiovascular, Gastroenterology, Genitourinary, Integumentary, Muscularskeletal, Psychiatric were all negative except for pertinent positives noted in my HPI.      OBJECTIVE:   /74 (BP Location: Right arm, Patient Position: Chair, Cuff Size: Adult Regular)   Pulse 66   Temp 97.2  F (36.2  C) (Tympanic)   Ht 1.753 m (5' 9\")   Wt 81.1 kg (178 lb 14.4 oz)   SpO2 99%   BMI 26.42 kg/m    EXAM:  GENERAL: healthy, alert and no distress  EYES: Eyes grossly normal to inspection, PERRL and conjunctivae and sclerae normal  HENT: ear canals and TM's normal, nose mildly edematous nasal mucosa, clear postnasal drainage and mouth without ulcers or lesions  NECK: no adenopathy, no asymmetry, masses, or scars and thyroid normal to palpation  RESP: lungs clear to auscultation - no " rales, rhonchi or wheezes  CV: regular rate and rhythm, normal S1 S2, no S3 or S4, no murmur, click or rub, no peripheral edema and peripheral pulses strong  ABDOMEN: soft, nontender, no hepatosplenomegaly, no masses and bowel sounds normal   (male): normal male genitalia without lesions or urethral discharge, no hernia  RECTAL: normal sphincter tone, no rectal masses and prostate of normal size for age, smooth, nontender without masses/nodules  MS: no gross musculoskeletal defects noted, no edema  SKIN: no suspicious lesions or rashes  NEURO: Normal strength and tone, mentation intact and speech normal  PSYCH: mentation appears normal, affect normal/bright  LYMPH: no cervical, supraclavicular, axillary, or inguinal adenopathy    Diagnostic Test Results:  Labs reviewed in Epic  Results for orders placed or performed in visit on 06/11/19 (from the past 24 hour(s))   Lipid panel reflex to direct LDL Fasting   Result Value Ref Range    Cholesterol 201 (H) <200 mg/dL    Triglycerides 157 (H) <150 mg/dL    HDL Cholesterol 55 >39 mg/dL    LDL Cholesterol Calculated 115 (H) <100 mg/dL    Non HDL Cholesterol 146 (H) <130 mg/dL   Prostate spec antigen screen   Result Value Ref Range    PSA 0.64 0 - 4 ug/L   Comprehensive metabolic panel   Result Value Ref Range    Sodium 140 133 - 144 mmol/L    Potassium 4.3 3.4 - 5.3 mmol/L    Chloride 107 94 - 109 mmol/L    Carbon Dioxide 27 20 - 32 mmol/L    Anion Gap 6 3 - 14 mmol/L    Glucose 103 (H) 70 - 99 mg/dL    Urea Nitrogen 17 7 - 30 mg/dL    Creatinine 0.87 0.66 - 1.25 mg/dL    GFR Estimate >90 >60 mL/min/[1.73_m2]    GFR Estimate If Black >90 >60 mL/min/[1.73_m2]    Calcium 9.1 8.5 - 10.1 mg/dL    Bilirubin Total 1.1 0.2 - 1.3 mg/dL    Albumin 4.1 3.4 - 5.0 g/dL    Protein Total 7.3 6.8 - 8.8 g/dL    Alkaline Phosphatase 78 40 - 150 U/L    ALT 36 0 - 70 U/L    AST 27 0 - 45 U/L   CBC with platelets   Result Value Ref Range    WBC 5.8 4.0 - 11.0 10e9/L    RBC Count 4.92 4.4 -  5.9 10e12/L    Hemoglobin 15.5 13.3 - 17.7 g/dL    Hematocrit 44.7 40.0 - 53.0 %    MCV 91 78 - 100 fl    MCH 31.5 26.5 - 33.0 pg    MCHC 34.7 31.5 - 36.5 g/dL    RDW 13.7 10.0 - 15.0 %    Platelet Count 168 150 - 450 10e9/L       ASSESSMENT/PLAN:   1. Encounter for general adult medical examination with abnormal findings  Screenings discussed.  Updated.    - Comprehensive metabolic panel  - CBC with platelets    2. Allergic rhinitis due to pollen, unspecified seasonality  3. Cough  Symptoms related to allergies likely - at night benadryl to continue.  Daytime antihistamines for prn use.  See instructions.  If cough symptoms not resolved with this follow up is recommended.  CXR in April with influenza episode normal.    4. Benign essential hypertension  Controlled.  Refill medications.  - Comprehensive metabolic panel  - CBC with platelets  - losartan (COZAAR) 25 MG tablet; Take 1 tablet (25 mg) by mouth daily  Dispense: 90 tablet; Refill: 3    5. Gastroesophageal reflux disease without esophagitis  Related to particular dietary intake.  OTC treatment discussed - see instructions.     6. Mixed hyperlipidemia  Higher levels - continue lipitor.    - Lipid panel reflex to direct LDL Fasting  - Comprehensive metabolic panel  - atorvastatin (LIPITOR) 20 MG tablet; Take 1 tablet (20 mg) by mouth daily  Dispense: 90 tablet; Refill: 3    7. Encounter for screening for HIV  - HIV Screening    8. Screening for prostate cancer  - Prostate spec antigen screen    9. Screening for diabetes mellitus  - Comprehensive metabolic panel    10. Need for MMR vaccine  - MMR, SUBQ (12+ MO)    COUNSELING:  Reviewed preventive health counseling, as reflected in patient instructions       Regular exercise       Healthy diet/nutrition       Vision screening       Immunizations    Vaccinated for: MMR             HIV screeninx in teen years, 1x in adult years, and at intervals if high risk       Colon cancer screening       Prostate cancer  "screening       Osteoporosis Prevention/Bone Health    Estimated body mass index is 26.42 kg/m  as calculated from the following:    Height as of this encounter: 1.753 m (5' 9\").    Weight as of this encounter: 81.1 kg (178 lb 14.4 oz).    Weight management plan: Discussed healthy diet and exercise guidelines     reports that he has never smoked. He has never used smokeless tobacco.      Counseling Resources:  ATP IV Guidelines  Pooled Cohorts Equation Calculator  FRAX Risk Assessment  ICSI Preventive Guidelines  Dietary Guidelines for Americans, 2010  USDA's MyPlate  ASA Prophylaxis  Lung CA Screening    Idania Marroquin MD  [facility]      Patient Instructions   Fasting labs today. Refills will be updated when results return.    For the sporadic cough symptoms, you can use claritin (loratidine) 10 mg OR zyrtec (cetrizine) 10 mg OR allegra (Fexofenadine) 180 mg. Each of these are dosed once daily in the AM.  It is ok to use the benadryl at night if needed for the cough at that time as well.      For the heartburn symptoms, you can use Zantac (ranitidine)  mg daily at the time of symptoms or prior to eating foods that cause symptoms.  Pepcid (famotidine) 20 mg is another option.      MMR vaccine today      "

## 2019-06-12 LAB — HIV 1+2 AB+HIV1 P24 AG SERPL QL IA: NONREACTIVE

## 2019-06-13 NOTE — RESULT ENCOUNTER NOTE
HIV screening is negative/normal.  Please call or [employer] message me if you have any questions.    PSK

## 2019-07-12 PROBLEM — M54.50 BILATERAL LOW BACK PAIN WITHOUT SCIATICA: Status: RESOLVED | Noted: 2019-04-12 | Resolved: 2019-07-12

## 2019-07-22 DIAGNOSIS — N52.9 ERECTILE DYSFUNCTION, UNSPECIFIED ERECTILE DYSFUNCTION TYPE: ICD-10-CM

## 2019-07-22 NOTE — TELEPHONE ENCOUNTER
"Requested Prescriptions   Pending Prescriptions Disp Refills     tadalafil (CIALIS) 5 MG tablet [Pharmacy Med Name: TADALAFIL 5 MG TABLET] 30 tablet 1     Sig: TAKE 1 TABLET BY MOUTH EVERY DAY       Erectile Dysfuction Protocol Failed - 7/22/2019  1:43 AM        Failed - Absence of Alpha Blockers on Med list        Passed - Absence of nitrates on medication list        Passed - Recent (12 mo) or future (30 days) visit within the authorizing provider's specialty     Patient had office visit in the last 12 months or has a visit in the next 30 days with authorizing provider or within the authorizing provider's specialty.  See \"Patient Info\" tab in inbasket, or \"Choose Columns\" in Meds & Orders section of the refill encounter.              Passed - Medication is active on med list        Passed - Patient is age 18 or older        tadalafil (CIALIS) 5 MG tablet  Last Written Prescription Date:  5/16/19  Last Fill Quantity: 30,  # refills: 1   Last office visit: 6/11/2019 with prescribing provider:  Dr. Marroquin   Future Office Visit:   Next 5 appointments (look out 90 days)    Aug 12, 2019  7:20 AM CDT  Return Visit with Magda Boothe MD  [facility] ([address] [address] [phone]           "

## 2019-07-23 NOTE — TELEPHONE ENCOUNTER
Routing refill request to provider for review/approval because:  Failed protocol please review.   Leigh Ann Hector RN

## 2019-07-24 RX ORDER — TADALAFIL 5 MG/1
TABLET ORAL
Qty: 30 TABLET | Refills: 1 | Status: SHIPPED | OUTPATIENT
Start: 2019-07-24

## 2019-08-12 ENCOUNTER — OFFICE VISIT (OUTPATIENT)
Dept: FAMILY MEDICINE | Facility: CLINIC | Age: 59
End: 2019-08-12
Payer: COMMERCIAL

## 2019-08-12 VITALS — DIASTOLIC BLOOD PRESSURE: 70 MMHG | SYSTOLIC BLOOD PRESSURE: 118 MMHG

## 2019-08-12 DIAGNOSIS — Z12.83 SKIN EXAM FOR MALIGNANT NEOPLASM: Primary | ICD-10-CM

## 2019-08-12 DIAGNOSIS — D22.5 MELANOCYTIC NEVI OF TRUNK: ICD-10-CM

## 2019-08-12 DIAGNOSIS — L81.4 SOLAR LENTIGO: ICD-10-CM

## 2019-08-12 PROCEDURE — 99213 OFFICE O/P EST LOW 20 MIN: CPT | Performed by: FAMILY MEDICINE

## 2019-08-12 NOTE — PROGRESS NOTES
"[facility] - PRIMARY CARE SKIN    CC: skin cancer screening (full-body)  SUBJECTIVE:   Gael Le is a(n) 58 year old male who presents to clinic today for a full-body skin exam.    Bothersome lesions noticed by the patient or other skin concerns :  Issue One:     Personal Medical History  Skin cancer: NO  Eczema Psoriasis Autoimmune   YES NO NO   Other: .  Family Medical History  Skin cancer: YES - father - nonmelanoma  Eczema Psoriasis Autoimmune   NO NO NO   Other:   .    Sun Exposure History  SPF- 35  Occupation: indoor ().    Refer to electronic medical record (EMR) for past medical history and medications.      ROS: 14 point review of systems was negative except the symptoms listed above in the HPI.        OBJECTIVE:   GENERAL: healthy, alert and no distress.  SKIN: [place] Skin Type - II.  This patient was examined from the top of the head to the bottom of the feet  including scalp, face, neck, trunk, buttocks, both arms, both legs, both hands, both feet, and all fingers and toes. The dermatoscope was used to help evaluate pigmented lesions.  Skin Pertinent Findings:   Back - scattered benign brown macules    Significant Findings:  none    ASSESSMENT:     Encounter Diagnoses   Name Primary?     Skin exam for malignant neoplasm Yes     Melanocytic nevi of trunk      Solar lentigo          PLAN:   Patient Instructions   SUN PROTECTION INSTRUCTIONS  Sun damage can lead to skin cancer and premature aging of the skin.      The best way to protect from sun damage to your skin is to avoid the sun during peak hours (10 am - 2 pm) even on overcast days.    Never use tanning beds. Tanning beds are associated with much higher risks of skin cancer.    All tanning damages the skin. Aim for ivory skin year round and you will have less trouble with your skin in years to come. There is no merit in getting \"a base tan\" before a warm weather vacation, as any tanning indicates your body's response to sun " "damage.    Stop smoking. Smokers have higher rates of skin cancer and also have premature skin wrinkling.    Use UPF sun-protective clothing, which while more expensive initially provides longer lasting coverage without having to worry about remembering to re-apply.  1. Wear a wide-brimmed hat and sunglasses.   2. Wear sun-protective clothing.  [employer] and other [employer] make sun protective clothing that are stylish, comfortable and cool.   [employer] and other [employer] make UV arm sleeves suitable for golfing, gardening and other activities.    Sunscreen instructions:  1. Use sunscreens with Zinc Oxide, Titanium Dioxide or Avobenzone to protect from UVA rays.  2. Use SPF 30-50+ to protect from UVB rays.  3. Re-apply every 2 hours even if water resistant.  4. Apply on your face every day even when cloudy and even in the winter. UVA \"aging rays\" penetrate window glass and are just as strong in the winter as in the summer.    FYI  You should use about 3 tablespoons of sunscreen to protect your whole body. Thus a typical eight ounce bottle of sunscreen should last 4 applications. Remember, that the SPF rating is compromised if you don t apply enough. Most people only apply 1/2 - 1/3 of the amount they need. Also don t forget areas such as your ears, feet, upper back and harder to reach places. Keep in mind that these amounts should be increased for larger body sizes.    Sunscreens with titanium dioxide and/or zinc oxide in the active ingredients are physical blockers as opposed to chemical blockers. Chemical-free sunscreens should not irritate the skin.    Spray-on sunscreens may be used for touch-up application only, not as a base layer. Also, use with caution around small children due to inhalation risk.    SPF means sun protection factor, which is just the degree to which the sunscreen can protect against UVB rays. There is no rating system for UVA rays. SPF is calculated as the time skin will burn " when sunscreen is applied vs. skin without sunscreen.    Water resistant sunscreens should be re-applied every 1-2 hours.    Product Recommendations:    Consider use of sunscreen sticks with Zinc Oxide and Titanium Dioxide active ingredients such as Neutrogena Pure&Free Baby Sunscreen Stick.    Good examples include: Blue Lizard, EltaMD, Solbar    Good daily moisturizers with SPF: Vanicream, CeraVe.    For sensitive skin, consider : SkinMedica Essential Defense Mineral Shield Broad Spectrum SPF 35    Men: consider use of Neutrogena Triple Protect Facial Lotion    Avoid retinyl palmitate products.  Avoid combination products that include both sunscreen and insect repellant, as sunscreen should be applied every 2 hours, but insect repellant should not be applied as frequently.    For more information:  https://www.skincancer.org/prevention/sun-protection/sunscreen/sunscreens-safe-and-effective      Yearly skin exam    The patient was counseled about sunscreens and sun avoidance. The patient was counseled to check the skin regularly and report any lesion that is new, changing, itching, scabbing, bleeding or otherwise bothersome. The patient was discharged ambulatory and in stable condition.        TT: 25 minutes.  CT: 15 minutes.

## 2019-08-12 NOTE — PATIENT INSTRUCTIONS
"SUN PROTECTION INSTRUCTIONS  Sun damage can lead to skin cancer and premature aging of the skin.      The best way to protect from sun damage to your skin is to avoid the sun during peak hours (10 am - 2 pm) even on overcast days.    Never use tanning beds. Tanning beds are associated with much higher risks of skin cancer.    All tanning damages the skin. Aim for ivory skin year round and you will have less trouble with your skin in years to come. There is no merit in getting \"a base tan\" before a warm weather vacation, as any tanning indicates your body's response to sun damage.    Stop smoking. Smokers have higher rates of skin cancer and also have premature skin wrinkling.    Use UPF sun-protective clothing, which while more expensive initially provides longer lasting coverage without having to worry about remembering to re-apply.  1. Wear a wide-brimmed hat and sunglasses.   2. Wear sun-protective clothing.  [employer] and other [employer] make sun protective clothing that are stylish, comfortable and cool.   [employer] and other [employer] make UV arm sleeves suitable for golfing, gardening and other activities.    Sunscreen instructions:  1. Use sunscreens with Zinc Oxide, Titanium Dioxide or Avobenzone to protect from UVA rays.  2. Use SPF 30-50+ to protect from UVB rays.  3. Re-apply every 2 hours even if water resistant.  4. Apply on your face every day even when cloudy and even in the winter. UVA \"aging rays\" penetrate window glass and are just as strong in the winter as in the summer.    FYI  You should use about 3 tablespoons of sunscreen to protect your whole body. Thus a typical eight ounce bottle of sunscreen should last 4 applications. Remember, that the SPF rating is compromised if you don t apply enough. Most people only apply 1/2 - 1/3 of the amount they need. Also don t forget areas such as your ears, feet, upper back and harder to reach places. Keep in mind that these amounts should be " increased for larger body sizes.    Sunscreens with titanium dioxide and/or zinc oxide in the active ingredients are physical blockers as opposed to chemical blockers. Chemical-free sunscreens should not irritate the skin.    Spray-on sunscreens may be used for touch-up application only, not as a base layer. Also, use with caution around small children due to inhalation risk.    SPF means sun protection factor, which is just the degree to which the sunscreen can protect against UVB rays. There is no rating system for UVA rays. SPF is calculated as the time skin will burn when sunscreen is applied vs. skin without sunscreen.    Water resistant sunscreens should be re-applied every 1-2 hours.    Product Recommendations:    Consider use of sunscreen sticks with Zinc Oxide and Titanium Dioxide active ingredients such as Neutrogena Pure&Free Baby Sunscreen Stick.    Good examples include: Blue Lizard, EltaMD, Solbar    Good daily moisturizers with SPF: Vanicream, CeraVe.    For sensitive skin, consider : SkinMedica Essential Defense Mineral Shield Broad Spectrum SPF 35    Men: consider use of Neutrogena Triple Protect Facial Lotion    Avoid retinyl palmitate products.  Avoid combination products that include both sunscreen and insect repellant, as sunscreen should be applied every 2 hours, but insect repellant should not be applied as frequently.    For more information:  https://www.skincancer.org/prevention/sun-protection/sunscreen/sunscreens-safe-and-effective      Yearly skin exam

## 2019-08-12 NOTE — LETTER
8/12/2019         RE: Gael Le  [address]  [facility] [address]        Dear Colleague,    Thank you for referring your patient, Gael Le, to the [facility]. Please see a copy of my visit note below.    [facility] - PRIMARY CARE SKIN    CC: skin cancer screening (full-body)  SUBJECTIVE:   Gael Le is a(n) 58 year old male who presents to clinic today for a full-body skin exam.    Bothersome lesions noticed by the patient or other skin concerns :  Issue One:     Personal Medical History  Skin cancer: NO  Eczema Psoriasis Autoimmune   YES NO NO   Other: .  Family Medical History  Skin cancer: YES - father - nonmelanoma  Eczema Psoriasis Autoimmune   NO NO NO   Other:   .    Sun Exposure History  SPF- 35  Occupation: indoor ().    Refer to electronic medical record (EMR) for past medical history and medications.      ROS: 14 point review of systems was negative except the symptoms listed above in the HPI.        OBJECTIVE:   GENERAL: healthy, alert and no distress.  SKIN: [place] Skin Type - II.  This patient was examined from the top of the head to the bottom of the feet  including scalp, face, neck, trunk, buttocks, both arms, both legs, both hands, both feet, and all fingers and toes. The dermatoscope was used to help evaluate pigmented lesions.  Skin Pertinent Findings:   Back - scattered benign brown macules    Significant Findings:  none    ASSESSMENT:     Encounter Diagnoses   Name Primary?     Skin exam for malignant neoplasm Yes     Melanocytic nevi of trunk      Solar lentigo          PLAN:   Patient Instructions   SUN PROTECTION INSTRUCTIONS  Sun damage can lead to skin cancer and premature aging of the skin.      The best way to protect from sun damage to your skin is to avoid the sun during peak hours (10 am - 2 pm) even on overcast days.    Never use tanning beds. Tanning beds are associated with much higher risks of skin cancer.    All tanning  "damages the skin. Aim for ivory skin year round and you will have less trouble with your skin in years to come. There is no merit in getting \"a base tan\" before a warm weather vacation, as any tanning indicates your body's response to sun damage.    Stop smoking. Smokers have higher rates of skin cancer and also have premature skin wrinkling.    Use UPF sun-protective clothing, which while more expensive initially provides longer lasting coverage without having to worry about remembering to re-apply.  1. Wear a wide-brimmed hat and sunglasses.   2. Wear sun-protective clothing.  [employer]ane [employer] and other [employer] make sun protective clothing that are stylish, comfortable and cool.   [employer] and other [employer] make UV arm sleeves suitable for golfing, gardening and other activities.    Sunscreen instructions:  1. Use sunscreens with Zinc Oxide, Titanium Dioxide or Avobenzone to protect from UVA rays.  2. Use SPF 30-50+ to protect from UVB rays.  3. Re-apply every 2 hours even if water resistant.  4. Apply on your face every day even when cloudy and even in the winter. UVA \"aging rays\" penetrate window glass and are just as strong in the winter as in the summer.    FYI  You should use about 3 tablespoons of sunscreen to protect your whole body. Thus a typical eight ounce bottle of sunscreen should last 4 applications. Remember, that the SPF rating is compromised if you don t apply enough. Most people only apply 1/2 - 1/3 of the amount they need. Also don t forget areas such as your ears, feet, upper back and harder to reach places. Keep in mind that these amounts should be increased for larger body sizes.    Sunscreens with titanium dioxide and/or zinc oxide in the active ingredients are physical blockers as opposed to chemical blockers. Chemical-free sunscreens should not irritate the skin.    Spray-on sunscreens may be used for touch-up application only, not as a base layer. Also, use with caution around " small children due to inhalation risk.    SPF means sun protection factor, which is just the degree to which the sunscreen can protect against UVB rays. There is no rating system for UVA rays. SPF is calculated as the time skin will burn when sunscreen is applied vs. skin without sunscreen.    Water resistant sunscreens should be re-applied every 1-2 hours.    Product Recommendations:    Consider use of sunscreen sticks with Zinc Oxide and Titanium Dioxide active ingredients such as Neutrogena Pure&Free Baby Sunscreen Stick.    Good examples include: Blue Lizard, EltaMD, Solbar    Good daily moisturizers with SPF: Vanicream, CeraVe.    For sensitive skin, consider : SkinMedica Essential Defense Mineral Shield Broad Spectrum SPF 35    Men: consider use of Neutrogena Triple Protect Facial Lotion    Avoid retinyl palmitate products.  Avoid combination products that include both sunscreen and insect repellant, as sunscreen should be applied every 2 hours, but insect repellant should not be applied as frequently.    For more information:  https://www.skincancer.org/prevention/sun-protection/sunscreen/sunscreens-safe-and-effective      Yearly skin exam    The patient was counseled about sunscreens and sun avoidance. The patient was counseled to check the skin regularly and report any lesion that is new, changing, itching, scabbing, bleeding or otherwise bothersome. The patient was discharged ambulatory and in stable condition.        TT: 25 minutes.  CT: 15 minutes.          Again, thank you for allowing me to participate in the care of your patient.        Sincerely,        Magda Boothe MD

## 2019-08-14 ENCOUNTER — E-VISIT (OUTPATIENT)
Dept: FAMILY MEDICINE | Facility: CLINIC | Age: 59
End: 2019-08-14
Payer: COMMERCIAL

## 2019-08-14 DIAGNOSIS — J34.0 NOSE CELLULITIS: Primary | ICD-10-CM

## 2019-08-14 PROCEDURE — 99444 ZZC PHYSICIAN ONLINE EVALUATION & MANAGEMENT SERVICE: CPT | Performed by: FAMILY MEDICINE

## 2019-08-14 RX ORDER — MUPIROCIN 20 MG/G
OINTMENT TOPICAL 3 TIMES DAILY
Qty: 15 G | Refills: 0 | Status: SHIPPED | OUTPATIENT
Start: 2019-08-14 | End: 2020-12-21

## 2019-08-27 ENCOUNTER — ALLIED HEALTH/NURSE VISIT (OUTPATIENT)
Dept: AUDIOLOGY | Facility: CLINIC | Age: 59
End: 2019-08-27
Payer: COMMERCIAL

## 2019-08-27 DIAGNOSIS — H90.3 SENSORINEURAL HEARING LOSS (SNHL) OF BOTH EARS: Primary | ICD-10-CM

## 2019-08-27 PROCEDURE — V5299 HEARING SERVICE: HCPCS | Performed by: AUDIOLOGIST

## 2019-08-27 NOTE — PROGRESS NOTES
AUDIOLOGY REPORT    BACKGROUND INFORMATION: Gael Le was seen in the Audiology Clinic  at [facility] on 8/27/2019 for follow-up.  The patient has been seen previously in this clinic and results revealed a bilateral mild to moderate sensorineural hearing loss.  The patient was fit with Phonak Movellaseo M90 hearing aids on 12/03/2018.  The patient reports that his right hearing aid is distorted.    TEST RESULTS AND PROCEDURES: A listening check was performed. An electronic buzz was observed. The hearing aid was sent to the  for warrant repair.     SUMMARY AND RECOMMENDATIONS: A hearing aid check was performed today and we will call the patient when here hearing aid returns from repair.  Call this clinic with questions regarding today s visit.    Cholo Ly.  Doctor of Audiology  [place] License # [id number]

## 2019-09-09 ENCOUNTER — OFFICE VISIT (OUTPATIENT)
Dept: AUDIOLOGY | Facility: CLINIC | Age: 59
End: 2019-09-09
Payer: COMMERCIAL

## 2019-09-09 DIAGNOSIS — H90.3 SENSORINEURAL HEARING LOSS (SNHL) OF BOTH EARS: Primary | ICD-10-CM

## 2019-09-09 PROCEDURE — V5299 HEARING SERVICE: HCPCS | Performed by: AUDIOLOGIST

## 2019-09-09 NOTE — PROGRESS NOTES
BACKGROUND INFORMATION: Gael Le was seen in the Audiology Clinic  at [facility] on 8/27/2019 to  his repaired right hearing aid.  The patient has been seen previously in this clinic and results revealed a bilateral mild to moderate sensorineural hearing loss.  The patient was fit with Phonak LawPaleo M90 hearing aids on 12/03/2018.      TEST RESULTS AND PROCEDURES: A listening check was performed. Real-ear verification was performed to ensure device met NAL-NL2 targets without exceeding UCLs. The patient reported good sound and comfort.       SUMMARY AND RECOMMENDATIONS: A hearing aid check was performed today and the patient reports good sound and comfort.  Call this clinic with questions regarding today s visit.     Cholo Ly.  Doctor of Audiology  [place] License # [id number]

## 2019-11-02 NOTE — TELEPHONE ENCOUNTER
Mail script to patient home. ANIRUDH  
Rx mailed to pts home.    Jessica EWING, Patient Care     
Applied

## 2020-01-21 ENCOUNTER — OFFICE VISIT (OUTPATIENT)
Dept: FAMILY MEDICINE | Facility: CLINIC | Age: 60
End: 2020-01-21
Payer: COMMERCIAL

## 2020-01-21 VITALS
HEART RATE: 87 BPM | DIASTOLIC BLOOD PRESSURE: 76 MMHG | WEIGHT: 178.3 LBS | BODY MASS INDEX: 26.41 KG/M2 | TEMPERATURE: 97.9 F | OXYGEN SATURATION: 97 % | RESPIRATION RATE: 18 BRPM | SYSTOLIC BLOOD PRESSURE: 134 MMHG | HEIGHT: 69 IN

## 2020-01-21 DIAGNOSIS — J32.9 SINUSITIS, UNSPECIFIED CHRONICITY, UNSPECIFIED LOCATION: ICD-10-CM

## 2020-01-21 DIAGNOSIS — R19.6 HALITOSIS: Primary | ICD-10-CM

## 2020-01-21 PROCEDURE — 99214 OFFICE O/P EST MOD 30 MIN: CPT | Performed by: FAMILY MEDICINE

## 2020-01-21 RX ORDER — DOXYCYCLINE HYCLATE 100 MG
100 TABLET ORAL 2 TIMES DAILY
Qty: 14 TABLET | Refills: 0 | Status: SHIPPED | OUTPATIENT
Start: 2020-01-21 | End: 2020-01-28

## 2020-01-21 ASSESSMENT — PAIN SCALES - GENERAL: PAINLEVEL: NO PAIN (0)

## 2020-01-21 ASSESSMENT — MIFFLIN-ST. JEOR: SCORE: 1604.14

## 2020-01-21 NOTE — PROGRESS NOTES
"Subjective     Gael Le is a 59 year old male who presents to clinic today for the following health issues:    HPI   Concern - Possible Halitosis  Onset: 2-3 weeks ago    Description:   White substance that he can pick out  in back of throat, bad breath    Intensity: moderate    Progression of Symptoms:  worsening    Accompanying Signs & Symptoms:  no    Previous history of similar problem:   no    Precipitating factors:   Worsened by: unsure    Alleviating factors:  Improved by: pick the substance out    Therapies Tried and outcome: nothing    SUBJECTIVE:  Here today with concerns regarding halitosis.  Patient says this is a relatively new issue in the last 2 to 3 weeks.  It does seem to correlate with the start of supplemental fish oil tablets.  He stopped those about a day ago.  But he wants to make sure there is nothing else going on.  Says that he is been having a lot of sinus pressure and postnasal drip with some thicker yellowish drainage.  He does not really feel sick per se.  Does not have excessive dry mouth and no particular reflux.  He does tend to develop what sounds like jean-paul-tonsillar stones that he can usually pick out.  Those have not increased.  So he really does not feel any different other than his breath has gotten a bit more sour.    Review of systems otherwise negative.  Past medical, family, and social history reviewed and updated in chart.    OBJECTIVE:  /76   Pulse 87   Temp 97.9  F (36.6  C) (Oral)   Resp 18   Ht 1.753 m (5' 9\")   Wt 80.9 kg (178 lb 4.8 oz)   SpO2 97%   BMI 26.33 kg/m    Alert, pleasant, upbeat, and in no apparent discomfort.  Ears normal. Throat and pharynx normal. Neck supple. No adenopathy or masses in the neck or supraclavicular regions. Sinuses non tender.  S1 and S2 normal, no murmurs, clicks, gallops or rubs. Regular rate and rhythm. Chest is clear; no wheezes or rales. No edema or JVD.   Past labs reviewed with the patient.     ASSESSMENT / " PLAN:  (R19.6) Halitosis  (primary encounter diagnosis)  Comment: I do not see an obvious oral source for the change in breath.  Tongue does not seem excessively dry.  No cervical lymphadenopathy.  My guess is this is related to the fish oil and I have advised him to stay off this for another week or 2 and see if it resolves.  Can always restart.  But I did discuss with him what our current knowledge is of something like a fish oil supplement anyway.  Plan:     (J32.9) Sinusitis, unspecified chronicity, unspecified location  Comment: He does have some concerns that this could be related to some sinus drainage and I think would be reasonable to try a week of antibiotics to see if this helps clear up both the drainage and the breath issue  Plan: doxycycline hyclate (VIBRA-TABS) 100 MG tablet            Follow up contact me in 1 week  RUSTAM Patrick MD    (Chart documentation completed in part with Dragon voice-recognition software.  Even though reviewed some grammatical, spelling, and word errors may remain.)

## 2020-01-29 DIAGNOSIS — F41.1 ANXIETY STATE: ICD-10-CM

## 2020-01-30 RX ORDER — ALPRAZOLAM 0.5 MG
TABLET ORAL
Qty: 30 TABLET | Refills: 0 | Status: SHIPPED | OUTPATIENT
Start: 2020-01-30 | End: 2020-07-08

## 2020-01-30 NOTE — TELEPHONE ENCOUNTER
Routing refill request to provider for review/approval because:  Drug not on the [facility] refill protocol     Sandra Moore RN, BSN, PHN

## 2020-01-30 NOTE — TELEPHONE ENCOUNTER
Requested Prescriptions   Pending Prescriptions Disp Refills     ALPRAZolam (XANAX) 0.5 MG tablet 30 tablet 0     Sig: TAKE 1 TABLET BY MOUTH THREE TIMES DAILY AS NEEDED FOR ANXIETY       There is no refill protocol information for this order          ALPRAZolam (XANAX) 0.5 MG tablet      Last Written Prescription Date:  1/28/19  Last Fill Quantity: 30,   # refills: 0  Last Office Visit: 1/21/2020  Future Office visit:       Routing refill request to provider for review/approval because:  Drug not on the [facility] refill protocol or controlled substance

## 2020-02-04 ENCOUNTER — MYC MEDICAL ADVICE (OUTPATIENT)
Dept: FAMILY MEDICINE | Facility: CLINIC | Age: 60
End: 2020-02-04

## 2020-02-11 ENCOUNTER — E-VISIT (OUTPATIENT)
Dept: FAMILY MEDICINE | Facility: CLINIC | Age: 60
End: 2020-02-11
Payer: COMMERCIAL

## 2020-02-11 DIAGNOSIS — R22.1 LUMP ON NECK: Primary | ICD-10-CM

## 2020-02-11 PROCEDURE — 99421 OL DIG E/M SVC 5-10 MIN: CPT | Performed by: FAMILY MEDICINE

## 2020-06-01 ENCOUNTER — E-VISIT (OUTPATIENT)
Dept: FAMILY MEDICINE | Facility: CLINIC | Age: 60
End: 2020-06-01
Payer: COMMERCIAL

## 2020-06-01 DIAGNOSIS — I10 BENIGN ESSENTIAL HYPERTENSION: Primary | ICD-10-CM

## 2020-06-01 PROCEDURE — 99421 OL DIG E/M SVC 5-10 MIN: CPT | Performed by: FAMILY MEDICINE

## 2020-06-01 RX ORDER — LOSARTAN POTASSIUM 50 MG/1
50 TABLET ORAL DAILY
Qty: 30 TABLET | Refills: 1 | Status: SHIPPED | OUTPATIENT
Start: 2020-06-01 | End: 2020-06-17

## 2020-06-01 NOTE — TELEPHONE ENCOUNTER
Provider E-Visit time total (minutes): 5 min      BP Readings from Last 6 Encounters:   01/21/20 134/76   08/12/19 118/70   06/11/19 116/74   04/12/19 132/84   04/03/19 134/60   12/06/18 135/83

## 2020-06-15 DIAGNOSIS — I10 BENIGN ESSENTIAL HYPERTENSION: ICD-10-CM

## 2020-06-15 NOTE — TELEPHONE ENCOUNTER
"Requested Prescriptions   Pending Prescriptions Disp Refills     losartan (COZAAR) 50 MG tablet 30 tablet 1     Sig: Take 1 tablet (50 mg) by mouth daily       Angiotensin-II Receptors Failed - 6/15/2020  1:45 PM        Failed - Normal serum creatinine on file in past 12 months     Recent Labs   Lab Test 06/11/19  0922   CR 0.87       Ok to refill medication if creatinine is low          Failed - Normal serum potassium on file in past 12 months     Recent Labs   Lab Test 06/11/19 0922   POTASSIUM 4.3                    Passed - Last blood pressure under 140/90 in past 12 months     BP Readings from Last 3 Encounters:   01/21/20 134/76   08/12/19 118/70   06/11/19 116/74                 Passed - Recent (12 mo) or future (30 days) visit within the authorizing provider's specialty     Patient has had an office visit with the authorizing provider or a provider within the authorizing providers department within the previous 12 mos or has a future within next 30 days. See \"Patient Info\" tab in inbasket, or \"Choose Columns\" in Meds & Orders section of the refill encounter.              Passed - Medication is active on med list        Passed - Patient is age 18 or older           losartan (COZAAR) 50 MG tablet  Last Written Prescription Date:  6/1/2020  Last Fill Quantity: 30,  # refills: 1   Last office visit: 1/21/2020 with prescribing provider:  Dr. Marroquin   Future Office Visit:            "

## 2020-06-17 RX ORDER — LOSARTAN POTASSIUM 50 MG/1
50 TABLET ORAL DAILY
Qty: 90 TABLET | Refills: 0 | Status: SHIPPED | OUTPATIENT
Start: 2020-06-17 | End: 2020-10-07

## 2020-06-17 NOTE — TELEPHONE ENCOUNTER
Routing refill request to provider for review/approval because:  Labs not current:  Creatinine and potassium    Monica Jeffers RN, [facility] Triage

## 2020-07-04 ENCOUNTER — MYC MEDICAL ADVICE (OUTPATIENT)
Dept: FAMILY MEDICINE | Facility: CLINIC | Age: 60
End: 2020-07-04

## 2020-07-06 ENCOUNTER — E-VISIT (OUTPATIENT)
Dept: FAMILY MEDICINE | Facility: CLINIC | Age: 60
End: 2020-07-06
Payer: COMMERCIAL

## 2020-07-06 DIAGNOSIS — K21.9 GASTROESOPHAGEAL REFLUX DISEASE WITHOUT ESOPHAGITIS: Primary | ICD-10-CM

## 2020-07-06 PROCEDURE — 99421 OL DIG E/M SVC 5-10 MIN: CPT | Performed by: FAMILY MEDICINE

## 2020-07-07 DIAGNOSIS — F41.1 ANXIETY STATE: ICD-10-CM

## 2020-07-08 RX ORDER — ALPRAZOLAM 0.5 MG
TABLET ORAL
Qty: 30 TABLET | Refills: 0 | Status: SHIPPED | OUTPATIENT
Start: 2020-07-08 | End: 2020-11-03

## 2020-07-09 DIAGNOSIS — E78.2 MIXED HYPERLIPIDEMIA: ICD-10-CM

## 2020-07-09 DIAGNOSIS — I10 BENIGN ESSENTIAL HYPERTENSION: Primary | ICD-10-CM

## 2020-07-09 DIAGNOSIS — Z13.1 SCREENING FOR DIABETES MELLITUS: ICD-10-CM

## 2020-07-09 DIAGNOSIS — Z12.5 SCREENING FOR PROSTATE CANCER: ICD-10-CM

## 2020-07-13 RX ORDER — ATORVASTATIN CALCIUM 20 MG/1
20 TABLET, FILM COATED ORAL DAILY
Qty: 30 TABLET | Refills: 0 | Status: SHIPPED | OUTPATIENT
Start: 2020-07-13 | End: 2020-08-26

## 2020-07-13 NOTE — TELEPHONE ENCOUNTER
"Routing refill request to provider for review/approval because:  Labs not current:  LDL      Requested Prescriptions   Pending Prescriptions Disp Refills     atorvastatin (LIPITOR) 20 MG tablet [Pharmacy Med Name: ATORVASTATIN 20 MG TABLET] 90 tablet 3     Sig: TAKE 1 TABLET BY MOUTH EVERY DAY       Statins Protocol Failed - 7/13/2020  7:43 AM        Failed - LDL on file in past 12 months     Recent Labs   Lab Test 06/11/19  0922   *             Passed - No abnormal creatine kinase in past 12 months     No lab results found.             Passed - Recent (12 mo) or future (30 days) visit within the authorizing provider's specialty     Patient has had an office visit with the authorizing provider or a provider within the authorizing providers department within the previous 12 mos or has a future within next 30 days. See \"Patient Info\" tab in inbasket, or \"Choose Columns\" in Meds & Orders section of the refill encounter.              Passed - Medication is active on med list        Passed - Patient is age 18 or older                 Sandra Moore RN, BSN, PHN    "

## 2020-07-13 NOTE — TELEPHONE ENCOUNTER
Refill for 1 month sent with instructions to follow-up.  Labs are recommended as well as a virtual visit.    ANIRUDH

## 2020-08-13 ENCOUNTER — MYC MEDICAL ADVICE (OUTPATIENT)
Dept: FAMILY MEDICINE | Facility: CLINIC | Age: 60
End: 2020-08-13

## 2020-08-13 DIAGNOSIS — E78.2 MIXED HYPERLIPIDEMIA: ICD-10-CM

## 2020-08-18 DIAGNOSIS — E78.2 MIXED HYPERLIPIDEMIA: ICD-10-CM

## 2020-08-20 NOTE — TELEPHONE ENCOUNTER
Routing refill request to provider for review/approval because:  Labs not current:  LDL    Monica Jeffers RN, [facility] Triage

## 2020-08-26 RX ORDER — ATORVASTATIN CALCIUM 20 MG/1
20 TABLET, FILM COATED ORAL DAILY
Qty: 30 TABLET | Refills: 0 | Status: SHIPPED | OUTPATIENT
Start: 2020-08-26 | End: 2020-09-24

## 2020-08-27 RX ORDER — ATORVASTATIN CALCIUM 20 MG/1
20 TABLET, FILM COATED ORAL DAILY
Qty: 30 TABLET | Refills: 0 | OUTPATIENT
Start: 2020-08-27

## 2020-09-18 DIAGNOSIS — E78.2 MIXED HYPERLIPIDEMIA: ICD-10-CM

## 2020-09-21 NOTE — TELEPHONE ENCOUNTER
Routing refill request to provider for review/approval because:      No LDL on file in past 12 months    Daya Calderón RN

## 2020-09-24 RX ORDER — ATORVASTATIN CALCIUM 20 MG/1
20 TABLET, FILM COATED ORAL DAILY
Qty: 30 TABLET | Refills: 0 | Status: SHIPPED | OUTPATIENT
Start: 2020-09-24 | End: 2020-10-18

## 2020-09-24 RX ORDER — ATORVASTATIN CALCIUM 20 MG/1
20 TABLET, FILM COATED ORAL DAILY
Qty: 30 TABLET | Refills: 0 | OUTPATIENT
Start: 2020-09-24

## 2020-09-27 ENCOUNTER — MYC MEDICAL ADVICE (OUTPATIENT)
Dept: FAMILY MEDICINE | Facility: CLINIC | Age: 60
End: 2020-09-27

## 2020-09-28 ENCOUNTER — E-VISIT (OUTPATIENT)
Dept: FAMILY MEDICINE | Facility: CLINIC | Age: 60
End: 2020-09-28
Payer: COMMERCIAL

## 2020-09-28 DIAGNOSIS — I10 BENIGN ESSENTIAL HYPERTENSION: Primary | ICD-10-CM

## 2020-09-28 DIAGNOSIS — E78.2 MIXED HYPERLIPIDEMIA: ICD-10-CM

## 2020-09-28 DIAGNOSIS — K21.9 GASTROESOPHAGEAL REFLUX DISEASE WITHOUT ESOPHAGITIS: ICD-10-CM

## 2020-09-28 DIAGNOSIS — F41.1 ANXIETY STATE: ICD-10-CM

## 2020-09-28 PROCEDURE — 99421 OL DIG E/M SVC 5-10 MIN: CPT | Performed by: FAMILY MEDICINE

## 2020-09-29 PROBLEM — K21.9 GASTROESOPHAGEAL REFLUX DISEASE WITHOUT ESOPHAGITIS: Status: ACTIVE | Noted: 2020-09-29

## 2020-10-04 DIAGNOSIS — I10 BENIGN ESSENTIAL HYPERTENSION: ICD-10-CM

## 2020-10-07 RX ORDER — LOSARTAN POTASSIUM 50 MG/1
50 TABLET ORAL DAILY
Qty: 30 TABLET | Refills: 0 | Status: SHIPPED | OUTPATIENT
Start: 2020-10-07 | End: 2020-10-12

## 2020-10-07 NOTE — TELEPHONE ENCOUNTER
Routing refill request to provider for review/approval because:  Labs not current:  Creatinine and potassium    Monica Jeffers RN, [facility] Triage

## 2020-10-12 ENCOUNTER — VIRTUAL VISIT (OUTPATIENT)
Dept: FAMILY MEDICINE | Facility: CLINIC | Age: 60
End: 2020-10-12
Payer: COMMERCIAL

## 2020-10-12 DIAGNOSIS — F41.9 ANXIETY: ICD-10-CM

## 2020-10-12 DIAGNOSIS — I10 BENIGN ESSENTIAL HYPERTENSION: Primary | ICD-10-CM

## 2020-10-12 PROCEDURE — 99214 OFFICE O/P EST MOD 30 MIN: CPT | Mod: 95 | Performed by: FAMILY MEDICINE

## 2020-10-12 PROCEDURE — 96127 BRIEF EMOTIONAL/BEHAV ASSMT: CPT | Performed by: FAMILY MEDICINE

## 2020-10-12 RX ORDER — METOPROLOL SUCCINATE 25 MG/1
25 TABLET, EXTENDED RELEASE ORAL DAILY
Qty: 30 TABLET | Refills: 1 | Status: SHIPPED | OUTPATIENT
Start: 2020-10-12 | End: 2020-11-06

## 2020-10-12 RX ORDER — LOSARTAN POTASSIUM 50 MG/1
50 TABLET ORAL DAILY
Qty: 90 TABLET | Refills: 1 | Status: SHIPPED | OUTPATIENT
Start: 2020-10-12 | End: 2021-04-27

## 2020-10-12 RX ORDER — FLUOXETINE 10 MG/1
10 CAPSULE ORAL DAILY
Qty: 30 CAPSULE | Refills: 1 | Status: SHIPPED | OUTPATIENT
Start: 2020-10-12 | End: 2020-11-06

## 2020-10-12 ASSESSMENT — ANXIETY QUESTIONNAIRES
IF YOU CHECKED OFF ANY PROBLEMS ON THIS QUESTIONNAIRE, HOW DIFFICULT HAVE THESE PROBLEMS MADE IT FOR YOU TO DO YOUR WORK, TAKE CARE OF THINGS AT HOME, OR GET ALONG WITH OTHER PEOPLE: SOMEWHAT DIFFICULT
3. WORRYING TOO MUCH ABOUT DIFFERENT THINGS: NEARLY EVERY DAY
2. NOT BEING ABLE TO STOP OR CONTROL WORRYING: NEARLY EVERY DAY
7. FEELING AFRAID AS IF SOMETHING AWFUL MIGHT HAPPEN: NEARLY EVERY DAY
6. BECOMING EASILY ANNOYED OR IRRITABLE: SEVERAL DAYS
5. BEING SO RESTLESS THAT IT IS HARD TO SIT STILL: MORE THAN HALF THE DAYS
1. FEELING NERVOUS, ANXIOUS, OR ON EDGE: NEARLY EVERY DAY
GAD7 TOTAL SCORE: 18

## 2020-10-12 ASSESSMENT — PATIENT HEALTH QUESTIONNAIRE - PHQ9: 5. POOR APPETITE OR OVEREATING: NEARLY EVERY DAY

## 2020-10-12 NOTE — PATIENT INSTRUCTIONS
Continue current losartan dose.  Add Metoprolol 25 mg daily.  Monitor BP at home.   Update me with BP readings in 7-10 days.    Begin Fluoxetine 10 mg daily.    Continue meditation and mindfulness techniques for anxiety symptoms.  As needed use of Xanax - sparingly.

## 2020-10-12 NOTE — PROGRESS NOTES
"Gael Le is a 60 year old male who is being evaluated via a billable telephone visit.      The patient has been notified of following:     \"This telephone visit will be conducted via a call between you and your physician/provider. We have found that certain health care needs can be provided without the need for a physical exam.  This service lets us provide the care you need with a short phone conversation.  If a prescription is necessary we can send it directly to your pharmacy.  If lab work is needed we can place an order for that and you can then stop by our lab to have the test done at a later time.    Telephone visits are billed at different rates depending on your insurance coverage. During this emergency period, for some insurers they may be billed the same as an in-person visit.  Please reach out to your insurance provider with any questions.    If during the course of the call the physician/provider feels a telephone visit is not appropriate, you will not be charged for this service.\"    Patient has given verbal consent for Telephone visit?  Yes    What phone number would you like to be contacted at? [phone]    How would you like to obtain your AVS? Claribel Vela     Gael Le is a 60 year old male who presents via phone visit today for the following health issues:    HPI     Hypertension Follow-up      Do you check your blood pressure regularly outside of the clinic? Yes     Are you following a low salt diet? Yes    Are your blood pressures ever more than 140 on the top number (systolic) OR more   than 90 on the bottom number (diastolic), for example 140/90? Yes  BP recently  162/96  - 150/90 consistently.  Initially increase to the Losartan of 50 mg decreased the BP but recently not as effective.  If not well hydrated, HR will rise.  No recent palpitations.  No headache.  Mild dizziness - 5 sec duration and not recurred.  No vertigo.          How many servings of fruits and " vegetables do you eat daily?  2-3    On average, how many sweetened beverages do you drink each day (Examples: soda, juice, sweet tea, etc.  Do NOT count diet or artificially sweetened beverages)?   2 coffee     How many days per week do you exercise enough to make your heart beat faster? 7    How many minutes a day do you exercise enough to make your heart beat faster? 30 - 60    How many days per week do you miss taking your medication? 0         Hyperlipidemia Follow-Up      Are you regularly taking any medication or supplement to lower your cholesterol?   Yes- lipitor    Are you having muscle aches or other side effects that you think could be caused by your cholesterol lowering medication?  No     Anxiety Follow-Up    How are you doing with your anxiety since your last visit?  Worsened  Situational components.    Are you having other symptoms that might be associated with depression or anxiety? No    Have you had a significant life event? Health Concerns and OTHER: political concerns     Do you have any concerns with your use of alcohol or other drugs? No    Social History     Tobacco Use     Smoking status: Never Smoker     Smokeless tobacco: Never Used   Substance Use Topics     Alcohol use: Yes     Drug use: No       MIRIAN-7 SCORE 10/12/2020   Total Score 18         Suicide Assessment Five-step Evaluation and Treatment (SAFE-T)        Review of Systems   Constitutional, HEENT, cardiovascular, pulmonary, gi and gu systems are negative, except as otherwise noted.       Objective          Vitals:  No vitals were obtained today due to virtual visit.    healthy, alert and no distress  PSYCH: Alert and oriented times 3; coherent speech, normal   rate and volume, able to articulate logical thoughts, able   to abstract reason, no tangential thoughts, no hallucinations   or delusions  His affect is anxious  RESP: No cough, no audible wheezing, able to talk in full sentences  Remainder of exam unable to be completed due  "to telephone visits            Assessment/Plan:    Assessment & Plan     Benign essential hypertension  BP high continued.  Continue current losartan dose.  Add metoprolol as noted.  Follow up BP at home with update to me in 1-2 weeks.    - metoprolol succinate ER (TOPROL-XL) 25 MG 24 hr tablet; Take 1 tablet (25 mg) by mouth daily  - losartan (COZAAR) 50 MG tablet; Take 1 tablet (50 mg) by mouth daily    Anxiety  Counseling offered - wife is a psychotherapist.   Has techniques for relaxation and mindfulness.   Trial of fluoxetine for preventative use daily.    - FLUoxetine (PROZAC) 10 MG capsule; Take 1 capsule (10 mg) by mouth daily     BMI:   Estimated body mass index is 26.33 kg/m  as calculated from the following:    Height as of 1/21/20: 1.753 m (5' 9\").    Weight as of 1/21/20: 80.9 kg (178 lb 4.8 oz).   Weight management plan: Discussed healthy diet and exercise guidelines         Patient Instructions   Continue current losartan dose.  Add Metoprolol 25 mg daily.  Monitor BP at home.   Update me with BP readings in 7-10 days.    Begin Fluoxetine 10 mg daily.    Continue meditation and mindfulness techniques for anxiety symptoms.  As needed use of Xanax - sparingly.          Return in about 4 weeks (around 11/9/2020) for recheck mood, BP Recheck.    Idania Marroquin MD  [facility]    Phone call duration:  22 minutes                "

## 2020-10-13 ENCOUNTER — MYC MEDICAL ADVICE (OUTPATIENT)
Dept: FAMILY MEDICINE | Facility: CLINIC | Age: 60
End: 2020-10-13

## 2020-10-13 ASSESSMENT — ANXIETY QUESTIONNAIRES: GAD7 TOTAL SCORE: 18

## 2020-10-15 DIAGNOSIS — Z12.5 SCREENING FOR PROSTATE CANCER: ICD-10-CM

## 2020-10-15 DIAGNOSIS — Z13.1 SCREENING FOR DIABETES MELLITUS: ICD-10-CM

## 2020-10-15 DIAGNOSIS — I10 BENIGN ESSENTIAL HYPERTENSION: ICD-10-CM

## 2020-10-15 DIAGNOSIS — E78.2 MIXED HYPERLIPIDEMIA: ICD-10-CM

## 2020-10-15 LAB
ALBUMIN SERPL-MCNC: 4 G/DL (ref 3.4–5)
ALP SERPL-CCNC: 88 U/L (ref 40–150)
ALT SERPL W P-5'-P-CCNC: 58 U/L (ref 0–70)
ANION GAP SERPL CALCULATED.3IONS-SCNC: 5 MMOL/L (ref 3–14)
AST SERPL W P-5'-P-CCNC: 41 U/L (ref 0–45)
BILIRUB SERPL-MCNC: 1 MG/DL (ref 0.2–1.3)
BUN SERPL-MCNC: 15 MG/DL (ref 7–30)
CALCIUM SERPL-MCNC: 9.1 MG/DL (ref 8.5–10.1)
CHLORIDE SERPL-SCNC: 106 MMOL/L (ref 94–109)
CHOLEST SERPL-MCNC: 245 MG/DL
CO2 SERPL-SCNC: 27 MMOL/L (ref 20–32)
CREAT SERPL-MCNC: 0.82 MG/DL (ref 0.66–1.25)
ERYTHROCYTE [DISTWIDTH] IN BLOOD BY AUTOMATED COUNT: 12.5 % (ref 10–15)
GFR SERPL CREATININE-BSD FRML MDRD: >90 ML/MIN/{1.73_M2}
GLUCOSE SERPL-MCNC: 99 MG/DL (ref 70–99)
HCT VFR BLD AUTO: 46.2 % (ref 40–53)
HDLC SERPL-MCNC: 72 MG/DL
HGB BLD-MCNC: 15.8 G/DL (ref 13.3–17.7)
LDLC SERPL CALC-MCNC: 137 MG/DL
MCH RBC QN AUTO: 32.2 PG (ref 26.5–33)
MCHC RBC AUTO-ENTMCNC: 34.2 G/DL (ref 31.5–36.5)
MCV RBC AUTO: 94 FL (ref 78–100)
NONHDLC SERPL-MCNC: 173 MG/DL
PLATELET # BLD AUTO: 185 10E9/L (ref 150–450)
POTASSIUM SERPL-SCNC: 4.1 MMOL/L (ref 3.4–5.3)
PROT SERPL-MCNC: 7.8 G/DL (ref 6.8–8.8)
PSA SERPL-ACNC: 0.66 UG/L (ref 0–4)
RBC # BLD AUTO: 4.9 10E12/L (ref 4.4–5.9)
SODIUM SERPL-SCNC: 138 MMOL/L (ref 133–144)
TRIGL SERPL-MCNC: 179 MG/DL
WBC # BLD AUTO: 6.4 10E9/L (ref 4–11)

## 2020-10-15 PROCEDURE — 80053 COMPREHEN METABOLIC PANEL: CPT | Performed by: FAMILY MEDICINE

## 2020-10-15 PROCEDURE — 36415 COLL VENOUS BLD VENIPUNCTURE: CPT | Performed by: FAMILY MEDICINE

## 2020-10-15 PROCEDURE — 80061 LIPID PANEL: CPT | Performed by: FAMILY MEDICINE

## 2020-10-15 PROCEDURE — G0103 PSA SCREENING: HCPCS | Performed by: FAMILY MEDICINE

## 2020-10-15 PROCEDURE — 85027 COMPLETE CBC AUTOMATED: CPT | Performed by: FAMILY MEDICINE

## 2020-11-02 DIAGNOSIS — F41.1 ANXIETY STATE: ICD-10-CM

## 2020-11-03 DIAGNOSIS — I10 BENIGN ESSENTIAL HYPERTENSION: ICD-10-CM

## 2020-11-03 DIAGNOSIS — F41.9 ANXIETY: ICD-10-CM

## 2020-11-03 RX ORDER — ALPRAZOLAM 0.5 MG
TABLET ORAL
Qty: 30 TABLET | Refills: 0 | Status: SHIPPED | OUTPATIENT
Start: 2020-11-03 | End: 2021-08-11

## 2020-11-03 NOTE — PROGRESS NOTES
Called the phone number listed below [phone] to verify if patient got flu shot. Left message to call back. SUBJECTIVE:  Gael Le is a 56 year old male complains of itch on his back.o  /80 (BP Location: Right arm, Patient Position: Chair, Cuff Size: Adult Regular)  Pulse 75  Wt 173 lb 6.4 oz (78.7 kg)  SpO2 100%  BMI 24.55 kg/m2    OBJECTIVE:  /80 (BP Location: Right arm, Patient Position: Chair, Cuff Size: Adult Regular)  Pulse 75  Wt 173 lb 6.4 oz (78.7 kg)  SpO2 100%  BMI 24.55 kg/m2  Exam; lt shoulder; mole. Solitary area solitary area. Raised elevated. Border appears normal. Unicolored. Not involving.    ASSESSMENT:  1. Skin  2. Itching    PLAN:  1. Triamcinolone  2. To PCP or derm for removal.      I suspect that this is a normal mole but he still needs this followed up.    Does not have worrisome signs but still needs to be reevaluated and probably removed

## 2020-11-06 RX ORDER — FLUOXETINE 10 MG/1
CAPSULE ORAL
Qty: 30 CAPSULE | Refills: 1 | Status: SHIPPED | OUTPATIENT
Start: 2020-11-06 | End: 2020-11-28 | Stop reason: SINTOL

## 2020-11-06 RX ORDER — METOPROLOL SUCCINATE 25 MG/1
TABLET, EXTENDED RELEASE ORAL
Qty: 30 TABLET | Refills: 1 | Status: SHIPPED | OUTPATIENT
Start: 2020-11-06 | End: 2020-12-03

## 2020-11-06 NOTE — TELEPHONE ENCOUNTER
Prescription approved per [facility] Refill Protocol.  Leigh Ann Hector RN  [facility]

## 2020-11-09 ENCOUNTER — MYC MEDICAL ADVICE (OUTPATIENT)
Dept: FAMILY MEDICINE | Facility: CLINIC | Age: 60
End: 2020-11-09

## 2020-11-09 ENCOUNTER — E-VISIT (OUTPATIENT)
Dept: FAMILY MEDICINE | Facility: CLINIC | Age: 60
End: 2020-11-09
Payer: COMMERCIAL

## 2020-11-09 DIAGNOSIS — Z20.822 CLOSE EXPOSURE TO 2019 NOVEL CORONAVIRUS: Primary | ICD-10-CM

## 2020-11-09 DIAGNOSIS — I10 BENIGN ESSENTIAL HYPERTENSION: ICD-10-CM

## 2020-11-09 PROCEDURE — 99421 OL DIG E/M SVC 5-10 MIN: CPT | Performed by: FAMILY MEDICINE

## 2020-11-09 RX ORDER — METOPROLOL SUCCINATE 50 MG/1
50 TABLET, EXTENDED RELEASE ORAL DAILY
Qty: 90 TABLET | Refills: 0 | Status: SHIPPED | OUTPATIENT
Start: 2020-11-09 | End: 2020-12-21 | Stop reason: ALTCHOICE

## 2020-11-09 NOTE — PATIENT INSTRUCTIONS
Dear Gael Le,    Based on your exposure to COVID-19 (coronavirus), we would like to test you for this virus. I have placed an order for this test and you will be called to schedule your Covid 19 curbside test. The optimal time to test after exposure is 5-7 days from the exposure.    If you know you have had close contact with someone who tested positive, you should be quarantined for 14 days after this exposure. You should stay in quarantine for the14 days even if the covid test is negative.     Quarantine means:  Stay home and away from others. Don't go to school or anywhere else. Generally quarantine means staying home from work but there are some exceptions to this. Please contact your workplace.  No hugging, kissing or shaking hands.  Don't let anyone visit.  Cover your mouth and nose with a mask, tissue or washcloth to avoid spreading germs.  Wash your hands and face often. Use soap and water.    What are the symptoms of COVID-19?  The most common symptoms are cough, fever and trouble breathing. Less common symptoms include headache, body aches, fatigue (feeling very tired), chills, sore throat, stuffy or runny nose, diarrhea (loose poop), loss of taste or smell, belly pain, and nausea or vomiting (feeling sick to your stomach or throwing up).  After 14 days, if you have still don't have symptoms, you likely don't have this virus.  If you develop symptoms, follow these guidelines.  If you're normally healthy: Please start another eVisit.  If you have a serious health problem (like cancer, heart failure, an organ transplant or kidney disease): Call your specialty clinic. Let them know that you might have COVID-19.    When it's time for your COVID test:  Stay at least 6 feet away from others. (If someone will drive you to your test, stay in the backseat, as far away from the  as you can.)  Cover your mouth and nose with a mask, tissue or washcloth.  Go straight to the testing site. Don't make any  stops on the way there or back.    Please note  Patients in these groups are at risk for severe illness due to COVID-19:    People 65 years and older    People who live in a nursing home or long-term care facility    People with chronic disease (lung, heart, cancer, diabetes, kidney, liver, immunologic)    People who have a weakened immune system, including those who:  o Are in cancer treatment  o Take medicine that weakens the immune system, such as corticosteroids  o Had a bone marrow or organ transplant  o Have an immune deficiency  o Have poorly controlled HIV or AIDS  o Are obese (body mass index of 40 or higher)  o Smoke regularly    Where can I get more information?  [facility] - About COVID-19: [email or web address]  CDC - What to Do If You're Sick: www.cdc.gov/coronavirus/2019-ncov/about/steps-when-sick.html  [facility] - Ending Home Isolation: www.cdc.gov/coronavirus/2019-ncov/hcp/disposition-in-home-patients.html  [facility] - Caring for Someone: www.cdc.gov/coronavirus/2019-ncov/if-you-are-sick/care-for-someone.html  [facility] - Interim Guidance for Hospital Discharge to Home: www.health.[facility].mn.us/diseases/coronavirus/hcp/hospdischarge.pdf  [facility] clinical trials (COVID-19 research studies): clinicalaffairs.[facility]-clinical-trials  Below are the COVID-19 hotlines at the [place] Department of Health ([facility]). Interpreters are available.  For health questions: Call 706-948-0610 or 1-556.976.7450 (7 a.m. to 7 p.m.)  For questions about schools and childcare: Call 827-214-5557 or 1-931.408.3272 (7 a.m. to 7 p.m.)

## 2020-11-28 ENCOUNTER — E-VISIT (OUTPATIENT)
Dept: FAMILY MEDICINE | Facility: CLINIC | Age: 60
End: 2020-11-28
Payer: COMMERCIAL

## 2020-11-28 DIAGNOSIS — Z01.84 IMMUNITY STATUS TESTING: Primary | ICD-10-CM

## 2020-11-28 DIAGNOSIS — Z03.818 ENCOUNTER FOR PATIENT CONCERN ABOUT EXPOSURE TO INFECTIOUS ORGANISM: ICD-10-CM

## 2020-11-28 PROCEDURE — 99421 OL DIG E/M SVC 5-10 MIN: CPT | Performed by: FAMILY MEDICINE

## 2020-11-28 NOTE — PATIENT INSTRUCTIONS
"  Based on the details you've shared, it looks like you're requesting an antibody (serology) test to see if you've had COVID-19 in the past.     This is a blood test. We take a small sample of your blood, then test it for something called \"antibodies.\" Your body makes antibodies to fight infection. If your blood has antibodies for COVID-19, it suggests you've been infected with the virus in the past.     We don't yet know if having antibodies protects you from getting the virus again in the future.     The test finds antibodies in most people 10 days after they get covid-19. For some people, it takes longer than 10 days for antibodies to show up. Others may never show antibodies against COVID-19, especially if they have a weak immune system .    At [facility], we offer this testing for people who want to be tested so I have ordered this test for you. Testing is limited each day and it may take time for testing to be available to everyone who has contacted us. You will receive a call within 72 hours to schedule the serology testing.      This test is not for people who have symptoms of COVID-19. Those who have symptoms should have a PCR test; this is done by a swab in the nose. If you do have symptoms and would like to have PCR testing, please resubmit your e-visit with further information.       Where can I get more information?   [facility] - About COVID-19:?[email or web address]     CDC - What to Do If You're Sick:  ?www.cdc.gov/coronavirus/2019-ncov/about/steps-when-sick.html     CDC - Ending Home Isolation:   ?www.cdc.gov/coronavirus/2019-ncov/hcp/disposition-in-home-patients.html     CDC - Caring for Someone with Covid:  ?www.cdc.gov/coronavirus/2019-ncov/if-you-are-sick/care-for-someone.html     [facility] clinical trials (COVID-19 research studies):  ?clinicalaffairs.[facility]/umn-clinical-trials     Below are the COVID-19 hotlines at the [place] Department of [facility] " ([facility]). Interpreters are available.   For health questions: Call 499-153-2570 or 1-455.788.6576 (7 a.m. to 7 p.m.)   For questions about schools and childcare: Call 152-181-7877 or 1-402.170.8532 (7 a.m. to 7 p.m.)

## 2020-12-01 DIAGNOSIS — I10 BENIGN ESSENTIAL HYPERTENSION: ICD-10-CM

## 2020-12-03 DIAGNOSIS — Z01.84 IMMUNITY STATUS TESTING: ICD-10-CM

## 2020-12-03 PROCEDURE — 36415 COLL VENOUS BLD VENIPUNCTURE: CPT | Performed by: FAMILY MEDICINE

## 2020-12-03 PROCEDURE — 86769 SARS-COV-2 COVID-19 ANTIBODY: CPT | Performed by: FAMILY MEDICINE

## 2020-12-03 RX ORDER — METOPROLOL SUCCINATE 25 MG/1
25 TABLET, EXTENDED RELEASE ORAL DAILY
Qty: 30 TABLET | Refills: 7 | Status: SHIPPED | OUTPATIENT
Start: 2020-12-03 | End: 2020-12-14 | Stop reason: DRUGHIGH

## 2020-12-03 NOTE — TELEPHONE ENCOUNTER
Prescription approved per [facility] Refill Protocol.  Daya Calderón RN

## 2020-12-08 LAB
COVID-19 SPIKE RBD ABY TITER: NORMAL
COVID-19 SPIKE RBD ABY: NEGATIVE

## 2020-12-20 ENCOUNTER — HEALTH MAINTENANCE LETTER (OUTPATIENT)
Age: 60
End: 2020-12-20

## 2020-12-21 ENCOUNTER — VIRTUAL VISIT (OUTPATIENT)
Dept: FAMILY MEDICINE | Facility: CLINIC | Age: 60
End: 2020-12-21
Payer: COMMERCIAL

## 2020-12-21 DIAGNOSIS — I10 BENIGN ESSENTIAL HYPERTENSION: Primary | ICD-10-CM

## 2020-12-21 DIAGNOSIS — Z20.822 CLOSE EXPOSURE TO 2019 NOVEL CORONAVIRUS: ICD-10-CM

## 2020-12-21 PROCEDURE — 99214 OFFICE O/P EST MOD 30 MIN: CPT | Mod: 95 | Performed by: FAMILY MEDICINE

## 2020-12-21 RX ORDER — AMLODIPINE BESYLATE 5 MG/1
5 TABLET ORAL DAILY
Qty: 30 TABLET | Refills: 1 | Status: SHIPPED | OUTPATIENT
Start: 2020-12-21 | End: 2021-02-15

## 2020-12-21 NOTE — PROGRESS NOTES
"Gael Le is a 60 year old male who is being evaluated via a billable video visit.      The patient has been notified of following:     \"This video visit will be conducted via a call between you and your physician/provider. We have found that certain health care needs can be provided without the need for an in-person physical exam.  This service lets us provide the care you need with a video conversation.  If a prescription is necessary we can send it directly to your pharmacy.  If lab work is needed we can place an order for that and you can then stop by our lab to have the test done at a later time.    Video visits are billed at different rates depending on your insurance coverage.  Please reach out to your insurance provider with any questions.    If during the course of the call the physician/provider feels a video visit is not appropriate, you will not be charged for this service.\"    Patient has given verbal consent for Video visit? Yes  How would you like to obtain your AVS? MyChart  If you are dropped from the video visit, the video invite should be resent to: Text to cell phone: [phone] back up  Will anyone else be joining your video visit? No      Video Start Time: 157 pm    Subjective     Gael Le is a 60 year old male who presents today via video visit for the following health issues:    HPI     Hypertension Follow-up      Do you check your blood pressure regularly outside of the clinic? Yes     Are you following a low salt diet? No    Are your blood pressures ever more than 140 on the top number (systolic) OR more   than 90 on the bottom number (diastolic), for example 140/90? Yes     Just started working out every other day - 2 miles, elliptical every other day.    ?sexual side effects -from the metoprolol.  He has a concern that his heart rate will go down too low while taking this if he goes up on the dosage.  He reports heart rate in the 60s to 70s on the 25 mg dosing when his " normal is closer to 80 to 90 bpm.  BP will go down after 2 glasses of wine and hot tub - 118/72.    Eating 2K calories/day.  Weight-      How many servings of fruits and vegetables do you eat daily?  2-3    On average, how many sweetened beverages do you drink each day (Examples: soda, juice, sweet tea, etc.  Do NOT count diet or artificially sweetened beverages)?   0    How many days per week do you exercise enough to make your heart beat faster? 4    How many minutes a day do you exercise enough to make your heart beat faster? 30 - 60    How many days per week do you miss taking your medication? 0       COVID -he and his wife were exposed to his daughter, son-in-law, and grandson on November 7 -his daughter came down with a fever that night and subsequently tested positive for Covid.  His wife became ill a few days later.  He never got symptoms.  He has been tested with a PCR test as well as antibody testing on 2 occasions all of which have been negative.  He is wondering if he is just marvel or there is some other reason that he did not get the illness.  He is quite anxious about this and other health concerns in general.            Review of Systems   Constitutional, HEENT, cardiovascular, pulmonary, gi and gu systems are negative, except as otherwise noted.      Objective           Vitals:  No vitals were obtained today due to virtual visit.    Physical Exam     GENERAL: Healthy, alert and no distress  EYES: Eyes grossly normal to inspection.  No discharge or erythema, or obvious scleral/conjunctival abnormalities.  RESP: No audible wheeze, cough, or visible cyanosis.  No visible retractions or increased work of breathing.    SKIN: Visible skin clear. No significant rash, abnormal pigmentation or lesions.  NEURO: Cranial nerves grossly intact.  Mentation and speech appropriate for age.  PSYCH: Mentation appears normal, affect normal/bright, anxious, judgement and insight intact, normal speech and appearance  well-groomed.              Assessment & Plan     Benign essential hypertension  Due to his concern about increasing the dose of the metoprolol, we are going to discontinue that and begin an amlodipine at 5 mg daily.  He will continue the losartan at 100 mg daily.  Did discuss potential side effects of lower extremity edema and he will monitor for that.  Hopefully with the use of the losartan along with the amlodipine this will not be an issue.  He will continue with his plan to resume his pre-Covid exercise routine as that was helpful with blood pressure control in the past.  - amLODIPine (NORVASC) 5 MG tablet; Take 1 tablet (5 mg) by mouth daily    Close exposure to 2019 novel coronavirus  He has had multiple tests that have been negative.  No additional testing now is recommended unless new exposure is noted.  It is likely that he is just fortunate and not having contracted the virus.  He should continue to use appropriate masking and social distancing for prevention and he is aware of it.          Patient Instructions   Continue the Losartan    Add Norvasc 5 mg daily.  Discontinue the metoprolol.    Send blood pressure readings to me in 1 week on the new treatment regimen.      Return in about 1 week (around 12/28/2020) for Send update blood pressure readings over my chart.    Idania Marroquin MD  [facility]      Video-Visit Details    Type of service:  Video Visit    Video End Time:2:50 PM    Originating Location (pt. Location): Home    Distant Location (provider location):  [facility]     Platform used for Video Visit: [employer]

## 2020-12-21 NOTE — PATIENT INSTRUCTIONS
Continue the Losartan    Add Norvasc 5 mg daily.  Discontinue the metoprolol.    Send blood pressure readings to me in 1 week on the new treatment regimen.

## 2021-01-01 NOTE — TELEPHONE ENCOUNTER
"Requested Prescriptions   Pending Prescriptions Disp Refills     tadalafil (CIALIS) 5 MG tablet [Pharmacy Med Name: TADALAFIL 5 MG TABLET] 30 tablet 1     Sig: TAKE 1 TABLET BY MOUTH EVERY DAY       Erectile Dysfuction Protocol Failed - 5/16/2019  9:58 AM        Failed - Absence of Alpha Blockers on Med list        Passed - Absence of nitrates on medication list        Passed - Recent (12 mo) or future (30 days) visit within the authorizing provider's specialty     Patient had office visit in the last 12 months or has a visit in the next 30 days with authorizing provider or within the authorizing provider's specialty.  See \"Patient Info\" tab in inbasket, or \"Choose Columns\" in Meds & Orders section of the refill encounter.              Passed - Medication is active on med list        Passed - Patient is age 18 or older        tadalafil (CIALIS) 5 MG tablet  Last Written Prescription Date:  11/15/18  Last Fill Quantity: 30,  # refills: 5   Last office visit: 4/12/2019 with prescribing provider:  Dr. Marroquin   Future Office Visit:   Next 5 appointments (look out 90 days)    May 28, 2019  9:20 AM CDT  [facility] [address] [place] [id number] [phone]           "
Follow up at office visit as noted.  PSK  
Routing refill request to provider for review/approval because:  Fails protocol, see below in red    Kelly Carter RN            
(2) cough or sneeze

## 2021-01-02 DIAGNOSIS — K21.9 GASTROESOPHAGEAL REFLUX DISEASE WITHOUT ESOPHAGITIS: ICD-10-CM

## 2021-01-05 NOTE — TELEPHONE ENCOUNTER
Prescription approved per [facility] Refill Protocol.      Sandra Moore RN, BSN, PHN

## 2021-01-20 ENCOUNTER — MYC MEDICAL ADVICE (OUTPATIENT)
Dept: FAMILY MEDICINE | Facility: CLINIC | Age: 61
End: 2021-01-20

## 2021-02-14 DIAGNOSIS — I10 BENIGN ESSENTIAL HYPERTENSION: ICD-10-CM

## 2021-02-15 RX ORDER — AMLODIPINE BESYLATE 5 MG/1
TABLET ORAL
Qty: 90 TABLET | Refills: 0 | Status: SHIPPED | OUTPATIENT
Start: 2021-02-15 | End: 2021-05-27

## 2021-02-15 NOTE — TELEPHONE ENCOUNTER
Routing refill request to provider for review/approval because:  Blood pressure fails protocol  Leigh Ann Hector RN  [facility]

## 2021-02-16 NOTE — TELEPHONE ENCOUNTER
BP Readings from Last 6 Encounters:   01/21/20 134/76   08/12/19 118/70   06/11/19 116/74   04/12/19 132/84   04/03/19 134/60   12/06/18 135/83         BP medication sent.  Last virtual visit 12/2020.     ANIRUDH

## 2021-04-27 DIAGNOSIS — I10 BENIGN ESSENTIAL HYPERTENSION: ICD-10-CM

## 2021-04-27 RX ORDER — LOSARTAN POTASSIUM 50 MG/1
50 TABLET ORAL DAILY
Qty: 90 TABLET | Refills: 0 | Status: SHIPPED | OUTPATIENT
Start: 2021-04-27 | End: 2021-08-05

## 2021-04-27 NOTE — TELEPHONE ENCOUNTER
Shellie given for appointment next week, BP needs to be updated     Stephanie Novoa RN, BSN, CMSRN  [facility]

## 2021-05-03 ENCOUNTER — MYC MEDICAL ADVICE (OUTPATIENT)
Dept: FAMILY MEDICINE | Facility: CLINIC | Age: 61
End: 2021-05-03

## 2021-05-03 DIAGNOSIS — E78.2 MIXED HYPERLIPIDEMIA: ICD-10-CM

## 2021-05-03 DIAGNOSIS — I10 BENIGN ESSENTIAL HYPERTENSION: Primary | ICD-10-CM

## 2021-05-03 DIAGNOSIS — Z13.1 SCREENING FOR DIABETES MELLITUS: ICD-10-CM

## 2021-05-03 DIAGNOSIS — E04.2 NONTOXIC MULTINODULAR GOITER: ICD-10-CM

## 2021-05-08 ASSESSMENT — ENCOUNTER SYMPTOMS
SHORTNESS OF BREATH: 0
HEADACHES: 0
DIARRHEA: 0
DIZZINESS: 0
HEMATURIA: 0
COUGH: 0
NERVOUS/ANXIOUS: 1
NAUSEA: 0
FEVER: 0
HEMATOCHEZIA: 0
MYALGIAS: 0
CHILLS: 0
WEAKNESS: 0
DYSURIA: 0
CONSTIPATION: 0
PARESTHESIAS: 0
HEARTBURN: 0
JOINT SWELLING: 0
ARTHRALGIAS: 0
EYE PAIN: 0
PALPITATIONS: 0
SORE THROAT: 0
FREQUENCY: 0
ABDOMINAL PAIN: 0

## 2021-05-10 ENCOUNTER — OFFICE VISIT (OUTPATIENT)
Dept: FAMILY MEDICINE | Facility: CLINIC | Age: 61
End: 2021-05-10
Payer: COMMERCIAL

## 2021-05-10 VITALS
DIASTOLIC BLOOD PRESSURE: 82 MMHG | OXYGEN SATURATION: 97 % | BODY MASS INDEX: 26.26 KG/M2 | HEIGHT: 69 IN | TEMPERATURE: 98.8 F | SYSTOLIC BLOOD PRESSURE: 152 MMHG | HEART RATE: 117 BPM | RESPIRATION RATE: 16 BRPM | WEIGHT: 177.3 LBS

## 2021-05-10 DIAGNOSIS — Z00.00 ROUTINE GENERAL MEDICAL EXAMINATION AT A HEALTH CARE FACILITY: Primary | ICD-10-CM

## 2021-05-10 DIAGNOSIS — I10 BENIGN ESSENTIAL HYPERTENSION: ICD-10-CM

## 2021-05-10 DIAGNOSIS — E78.2 MIXED HYPERLIPIDEMIA: ICD-10-CM

## 2021-05-10 DIAGNOSIS — F41.9 ANXIETY: ICD-10-CM

## 2021-05-10 PROCEDURE — 96127 BRIEF EMOTIONAL/BEHAV ASSMT: CPT | Performed by: FAMILY MEDICINE

## 2021-05-10 PROCEDURE — 99213 OFFICE O/P EST LOW 20 MIN: CPT | Mod: 25 | Performed by: FAMILY MEDICINE

## 2021-05-10 PROCEDURE — 99396 PREV VISIT EST AGE 40-64: CPT | Performed by: FAMILY MEDICINE

## 2021-05-10 RX ORDER — ESCITALOPRAM OXALATE 10 MG/1
10 TABLET ORAL DAILY
Qty: 30 TABLET | Refills: 1 | Status: SHIPPED | OUTPATIENT
Start: 2021-05-10 | End: 2021-06-03

## 2021-05-10 ASSESSMENT — ENCOUNTER SYMPTOMS
HEMATURIA: 0
FREQUENCY: 0
PALPITATIONS: 0
NAUSEA: 0
MYALGIAS: 0
PARESTHESIAS: 0
CONSTIPATION: 0
WEAKNESS: 0
DYSURIA: 0
HEMATOCHEZIA: 0
ABDOMINAL PAIN: 0
DIZZINESS: 0
CHILLS: 0
DIARRHEA: 0
HEARTBURN: 0
EYE PAIN: 0
SHORTNESS OF BREATH: 0
FEVER: 0
NERVOUS/ANXIOUS: 1
HEADACHES: 0
JOINT SWELLING: 0
SORE THROAT: 0
ARTHRALGIAS: 0
COUGH: 0

## 2021-05-10 ASSESSMENT — ANXIETY QUESTIONNAIRES
GAD7 TOTAL SCORE: 13
6. BECOMING EASILY ANNOYED OR IRRITABLE: NOT AT ALL
IF YOU CHECKED OFF ANY PROBLEMS ON THIS QUESTIONNAIRE, HOW DIFFICULT HAVE THESE PROBLEMS MADE IT FOR YOU TO DO YOUR WORK, TAKE CARE OF THINGS AT HOME, OR GET ALONG WITH OTHER PEOPLE: SOMEWHAT DIFFICULT
2. NOT BEING ABLE TO STOP OR CONTROL WORRYING: MORE THAN HALF THE DAYS
7. FEELING AFRAID AS IF SOMETHING AWFUL MIGHT HAPPEN: MORE THAN HALF THE DAYS
3. WORRYING TOO MUCH ABOUT DIFFERENT THINGS: MORE THAN HALF THE DAYS
5. BEING SO RESTLESS THAT IT IS HARD TO SIT STILL: MORE THAN HALF THE DAYS
1. FEELING NERVOUS, ANXIOUS, OR ON EDGE: NEARLY EVERY DAY

## 2021-05-10 ASSESSMENT — MIFFLIN-ST. JEOR: SCORE: 1594.61

## 2021-05-10 ASSESSMENT — PATIENT HEALTH QUESTIONNAIRE - PHQ9
SUM OF ALL RESPONSES TO PHQ QUESTIONS 1-9: 0
5. POOR APPETITE OR OVEREATING: MORE THAN HALF THE DAYS

## 2021-05-10 NOTE — PATIENT INSTRUCTIONS
Return to clinic for fasting labs on Thursday as planned.    Recheck BP on Thursday AM recommended as well.    You can try the zyrtec in AM or at night for the drainage symptoms along with or as an alternative to the Benadryl.    Begin Lexapro 10 mg daily in the AM.  This is to prevent anxiety symptoms.  I would recommend follow up in 3-5 weeks.  Sooner follow up if side effects occur or other concerns.

## 2021-05-10 NOTE — PROGRESS NOTES
xaproSUBJECTIVE:   CC: Gael Le is an 60 year old male who presents for preventative health visit.       Patient has been advised of split billing requirements and indicates understanding: Yes  Healthy Habits:     Getting at least 3 servings of Calcium per day:  Yes    Bi-annual eye exam:  Yes    Dental care twice a year:  Yes    Sleep apnea or symptoms of sleep apnea:  None    Diet:  Regular (no restrictions)    Frequency of exercise:  4-5 days/week    Duration of exercise:  30-45 minutes    Taking medications regularly:  Yes    Medication side effects:  None    PHQ-2 Total Score: 0    Additional concerns today:  Yes  walking, ellpitcal    Anxiety - feeling more anxious.  situational component to this with family stress, health stress, COVID, political stresses.  Is agreeable to trying medication to prevent anxiety symptoms. Will use xanax occasionally.  Alcohol used in evening at times to relax.  Knows that is not healthy for him.  PHQ 5/10/2021   PHQ-9 Total Score 0   Q9: Thoughts of better off dead/self-harm past 2 weeks Not at all     MIRIAN-7 SCORE 10/12/2020 5/10/2021   Total Score 18 13     Hyperlipidemia Follow-Up      Are you regularly taking any medication or supplement to lower your cholesterol?   Yes- Lipitor    Are you having muscle aches or other side effects that you think could be caused by your cholesterol lowering medication?  No    Hypertension Follow-up      Do you check your blood pressure regularly outside of the clinic? Yes     Are you following a low salt diet? Yes    Are your blood pressures ever more than 140 on the top number (systolic) OR more   than 90 on the bottom number (diastolic), for example 140/90? No  135/85    Cough at night, taking benadryl at times with some relief.  Cant take during day due to sedation. Has taken claritin without relief.        Today's PHQ-2 Score:   PHQ-2 ( 1999 Pfizer) 5/8/2021   Q1: Little interest or pleasure in doing things 0   Q2: Feeling down,  depressed or hopeless 0   PHQ-2 Score 0   Q1: Little interest or pleasure in doing things Not at all   Q2: Feeling down, depressed or hopeless Not at all   PHQ-2 Score 0       Abuse: Current or Past(Physical, Sexual or Emotional)- No  Do you feel safe in your environment? Yes        Social History     Tobacco Use     Smoking status: Never Smoker     Smokeless tobacco: Never Used   Substance Use Topics     Alcohol use: Yes     Comment: Moderate         Alcohol Use 5/8/2021   Prescreen: >3 drinks/day or >7 drinks/week? Yes   Prescreen: >3 drinks/day or >7 drinks/week? -   AUDIT SCORE  5       Last PSA:   PSA   Date Value Ref Range Status   10/15/2020 0.66 0 - 4 ug/L Final     Comment:     Assay Method:  Chemiluminescence using Siemens Vista analyzer       Reviewed orders with patient. Reviewed health maintenance and updated orders accordingly - Yes  BP Readings from Last 3 Encounters:   05/10/21 (!) 162/80   01/21/20 134/76   08/12/19 118/70    Wt Readings from Last 3 Encounters:   05/10/21 80.4 kg (177 lb 4.8 oz)   01/21/20 80.9 kg (178 lb 4.8 oz)   06/11/19 81.1 kg (178 lb 14.4 oz)                    Reviewed and updated as needed this visit by clinical staff  Tobacco  Allergies  Meds   Med Hx  Surg Hx  Fam Hx  Soc Hx        Reviewed and updated as needed this visit by Provider  Tobacco  Allergies  Meds   Med Hx  Surg Hx  Fam Hx  Soc Hx       Past Medical History:   Diagnosis Date     Arthritis     mild in knees and feet     Hypertension      Nontoxic multinodular goiter      Supraventricular premature beats       Past Surgical History:   Procedure Laterality Date     ABDOMEN SURGERY  1968    Hernia     BIOPSY  1993    Bone Cyst - none cancerous     COLONOSCOPY  11/21/16    4 small polyps, some pockets     COLONOSCOPY WITH CO2 INSUFFLATION N/A 11/21/2016    Procedure: COLONOSCOPY WITH CO2 INSUFFLATION;  Surgeon: Adeel Graf MD;  Location: [facility] OR     FINGER SURGERY       HERNIA REPAIR        "ORTHOPEDIC SURGERY  1993    Bone cyst in Right Ring finger       Review of Systems   Constitutional: Negative for chills and fever.   HENT: Positive for hearing loss. Negative for congestion, ear pain and sore throat.    Eyes: Negative for pain and visual disturbance.   Respiratory: Negative for cough and shortness of breath.    Cardiovascular: Negative for chest pain, palpitations and peripheral edema.   Gastrointestinal: Negative for abdominal pain, constipation, diarrhea, heartburn, hematochezia and nausea.   Genitourinary: Positive for impotence and urgency. Negative for discharge, dysuria, frequency, genital sores and hematuria.   Musculoskeletal: Negative for arthralgias, joint swelling and myalgias.   Skin: Negative for rash.   Neurological: Negative for dizziness, weakness, headaches and paresthesias.   Psychiatric/Behavioral: Negative for mood changes. The patient is nervous/anxious.          OBJECTIVE:   BP (!) 162/80   Pulse 117   Temp 98.8  F (37.1  C) (Oral)   Resp 16   Ht 1.753 m (5' 9\")   Wt 80.4 kg (177 lb 4.8 oz)   SpO2 97%   BMI 26.18 kg/m      Physical Exam  GENERAL: healthy, alert and no distress  EYES: Eyes grossly normal to inspection, PERRL and conjunctivae and sclerae normal  HENT: ear canals and TM's normal, nose and mouth without ulcers or lesions  NECK: no adenopathy, no asymmetry, masses, or scars and thyroid normal to palpation  RESP: lungs clear to auscultation - no rales, rhonchi or wheezes  CV: regular rate and rhythm, normal S1 S2, no S3 or S4, no murmur, click or rub, no peripheral edema and peripheral pulses strong  ABDOMEN: soft, nontender, no hepatosplenomegaly, no masses and bowel sounds normal   (male): normal male genitalia without lesions or urethral discharge, no hernia  RECTAL: normal sphincter tone, no rectal masses, prostate normal size, smooth, nontender without nodules or masses  MS: no gross musculoskeletal defects noted, no edema  SKIN: no suspicious lesions " "or rashes  NEURO: Normal strength and tone, mentation intact and speech normal  PSYCH: mentation appears normal, affect normal/bright and anxious  LYMPH: no cervical, supraclavicular, axillary, or inguinal adenopathy    Diagnostic Test Results:  Labs reviewed in Epic    ASSESSMENT/PLAN:   1. Routine general medical examination at a health care facility  Screening and preventative care.  Has follow up appointment for labs on 5/13.  Orders are available for this.      2. Anxiety  Ongoing anxiety -some situational component but daily symptoms.  Trial of lexapro for symptoms now.  Follow up in 4-6 weeks.    - escitalopram (LEXAPRO) 10 MG tablet; Take 1 tablet (10 mg) by mouth daily  Dispense: 30 tablet; Refill: 1    3. Mixed hyperlipidemia  Labs as noted refill lipitor after labs return.    4. Benign essential hypertension  Elevated BP today.  Recheck with lab appointment planned.  Continue current medication until evaluation in 4-6 weeks.     Post nasal drainage - cough -  Discussed that Zyrtec is the most similar to Benadryl but may not cause sedation so he can use this during the day.  He will try this and let us know if that is effective.      Patient has been advised of split billing requirements and indicates understanding: Yes  COUNSELING:   Reviewed preventive health counseling, as reflected in patient instructions       Regular exercise       Healthy diet/nutrition       Vision screening       Hearing screening       Prostate cancer screening       Osteoporosis prevention/bone health    Estimated body mass index is 26.18 kg/m  as calculated from the following:    Height as of this encounter: 1.753 m (5' 9\").    Weight as of this encounter: 80.4 kg (177 lb 4.8 oz).     Weight management plan: Discussed healthy diet and exercise guidelines    He reports that he has never smoked. He has never used smokeless tobacco.      Counseling Resources:  ATP IV Guidelines  Pooled Cohorts Equation Calculator  FRAX Risk " Assessment  ICSI Preventive Guidelines  Dietary Guidelines for Americans, 2010  [employer]'s MyPlate  ASA Prophylaxis  Lung CA Screening    Idania Marroquin MD  [facility]    Patient Instructions   Return to clinic for fasting labs on Thursday as planned.    Recheck BP on Thursday AM recommended as well.    You can try the zyrtec in AM or at night for the drainage symptoms along with or as an alternative to the Benadryl.    Begin Lexapro 10 mg daily in the AM.  This is to prevent anxiety symptoms.  I would recommend follow up in 3-5 weeks.  Sooner follow up if side effects occur or other concerns.

## 2021-05-11 ASSESSMENT — ANXIETY QUESTIONNAIRES: GAD7 TOTAL SCORE: 13

## 2021-05-13 DIAGNOSIS — I10 BENIGN ESSENTIAL HYPERTENSION: ICD-10-CM

## 2021-05-13 DIAGNOSIS — E78.2 MIXED HYPERLIPIDEMIA: ICD-10-CM

## 2021-05-13 DIAGNOSIS — Z13.1 SCREENING FOR DIABETES MELLITUS: ICD-10-CM

## 2021-05-13 DIAGNOSIS — E04.2 NONTOXIC MULTINODULAR GOITER: ICD-10-CM

## 2021-05-13 LAB
ALBUMIN SERPL-MCNC: 3.8 G/DL (ref 3.4–5)
ALP SERPL-CCNC: 74 U/L (ref 40–150)
ALT SERPL W P-5'-P-CCNC: 55 U/L (ref 0–70)
ANION GAP SERPL CALCULATED.3IONS-SCNC: 7 MMOL/L (ref 3–14)
AST SERPL W P-5'-P-CCNC: 39 U/L (ref 0–45)
BILIRUB SERPL-MCNC: 1 MG/DL (ref 0.2–1.3)
BUN SERPL-MCNC: 14 MG/DL (ref 7–30)
CALCIUM SERPL-MCNC: 8.8 MG/DL (ref 8.5–10.1)
CHLORIDE SERPL-SCNC: 109 MMOL/L (ref 94–109)
CHOLEST SERPL-MCNC: 201 MG/DL
CO2 SERPL-SCNC: 26 MMOL/L (ref 20–32)
CREAT SERPL-MCNC: 0.82 MG/DL (ref 0.66–1.25)
CREAT UR-MCNC: 53 MG/DL
ERYTHROCYTE [DISTWIDTH] IN BLOOD BY AUTOMATED COUNT: 13.1 % (ref 10–15)
GFR SERPL CREATININE-BSD FRML MDRD: >90 ML/MIN/{1.73_M2}
GLUCOSE SERPL-MCNC: 102 MG/DL (ref 70–99)
HCT VFR BLD AUTO: 42.2 % (ref 40–53)
HDLC SERPL-MCNC: 68 MG/DL
HGB BLD-MCNC: 14.6 G/DL (ref 13.3–17.7)
LDLC SERPL CALC-MCNC: 107 MG/DL
MCH RBC QN AUTO: 32.1 PG (ref 26.5–33)
MCHC RBC AUTO-ENTMCNC: 34.6 G/DL (ref 31.5–36.5)
MCV RBC AUTO: 93 FL (ref 78–100)
MICROALBUMIN UR-MCNC: 6 MG/L
MICROALBUMIN/CREAT UR: 11.82 MG/G CR (ref 0–17)
NONHDLC SERPL-MCNC: 133 MG/DL
PLATELET # BLD AUTO: 172 10E9/L (ref 150–450)
POTASSIUM SERPL-SCNC: 3.8 MMOL/L (ref 3.4–5.3)
PROT SERPL-MCNC: 7 G/DL (ref 6.8–8.8)
RBC # BLD AUTO: 4.55 10E12/L (ref 4.4–5.9)
SODIUM SERPL-SCNC: 142 MMOL/L (ref 133–144)
TRIGL SERPL-MCNC: 129 MG/DL
TSH SERPL DL<=0.005 MIU/L-ACNC: 2.86 MU/L (ref 0.4–4)
WBC # BLD AUTO: 6.2 10E9/L (ref 4–11)

## 2021-05-13 PROCEDURE — 80053 COMPREHEN METABOLIC PANEL: CPT | Performed by: FAMILY MEDICINE

## 2021-05-13 PROCEDURE — 36415 COLL VENOUS BLD VENIPUNCTURE: CPT | Performed by: FAMILY MEDICINE

## 2021-05-13 PROCEDURE — 84443 ASSAY THYROID STIM HORMONE: CPT | Performed by: FAMILY MEDICINE

## 2021-05-13 PROCEDURE — 80061 LIPID PANEL: CPT | Performed by: FAMILY MEDICINE

## 2021-05-13 PROCEDURE — 85027 COMPLETE CBC AUTOMATED: CPT | Performed by: FAMILY MEDICINE

## 2021-05-13 PROCEDURE — 82043 UR ALBUMIN QUANTITATIVE: CPT | Performed by: FAMILY MEDICINE

## 2021-05-17 NOTE — RESULT ENCOUNTER NOTE
"Your urine testing is normal.  There is not elevated protein levels present.  Your thyroid testing is normal.  Your blood sugar is borderline elevated.  This is in the \"prediabetic\" range.  Exercise and limiting carbohydrate and sugars in diet can be helpful at preventing progression to diabetes.  Your kidney and liver testing is normal.  Your cholesterol levels are improved compared with previous.  Triglyceride level is normal now which is improved as well.  Continuing Lipitor is recommended.   Your blood cell counts are normal  Please call or MyChart message me if you have any questions.    PSK  "

## 2021-05-25 DIAGNOSIS — I10 BENIGN ESSENTIAL HYPERTENSION: ICD-10-CM

## 2021-05-26 NOTE — TELEPHONE ENCOUNTER
Routing refill request to provider for review/approval because:  BP Readings from Last 3 Encounters:   05/10/21 (!) 152/82   01/21/20 134/76   08/12/19 118/70         Monica TREVIÑON, RN

## 2021-05-27 RX ORDER — AMLODIPINE BESYLATE 5 MG/1
TABLET ORAL
Qty: 90 TABLET | Refills: 0 | Status: SHIPPED | OUTPATIENT
Start: 2021-05-27 | End: 2021-08-11

## 2021-06-03 DIAGNOSIS — F41.9 ANXIETY: ICD-10-CM

## 2021-06-03 RX ORDER — ESCITALOPRAM OXALATE 10 MG/1
TABLET ORAL
Qty: 30 TABLET | Refills: 0 | Status: SHIPPED | OUTPATIENT
Start: 2021-06-03 | End: 2021-06-21

## 2021-06-10 ENCOUNTER — TELEPHONE (OUTPATIENT)
Dept: FAMILY MEDICINE | Facility: CLINIC | Age: 61
End: 2021-06-10

## 2021-06-13 ENCOUNTER — MYC MEDICAL ADVICE (OUTPATIENT)
Dept: FAMILY MEDICINE | Facility: CLINIC | Age: 61
End: 2021-06-13

## 2021-06-20 ENCOUNTER — TELEPHONE (OUTPATIENT)
Dept: FAMILY MEDICINE | Facility: CLINIC | Age: 61
End: 2021-06-20

## 2021-06-20 DIAGNOSIS — F41.9 ANXIETY: ICD-10-CM

## 2021-06-21 RX ORDER — ESCITALOPRAM OXALATE 10 MG/1
TABLET ORAL
Qty: 90 TABLET | Refills: 0 | Status: SHIPPED | OUTPATIENT
Start: 2021-06-21 | End: 2022-05-26

## 2021-06-22 NOTE — TELEPHONE ENCOUNTER
To Provider,  How far out would you like the patient scheduled for the follow up to starting the Lexapro please?  Please advise.  Thank you,  Robert Lyons

## 2021-06-22 NOTE — TELEPHONE ENCOUNTER
This is new med for patient.   Patient due for f/u appt with pcp for lexapro start.   Please call to schedule.

## 2021-08-02 ENCOUNTER — E-VISIT (OUTPATIENT)
Dept: FAMILY MEDICINE | Facility: CLINIC | Age: 61
End: 2021-08-02
Payer: COMMERCIAL

## 2021-08-02 ENCOUNTER — MYC MEDICAL ADVICE (OUTPATIENT)
Dept: FAMILY MEDICINE | Facility: CLINIC | Age: 61
End: 2021-08-02
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
## 2021-08-02 DIAGNOSIS — L08.9: ICD-10-CM

## 2021-08-02 DIAGNOSIS — L01.00 IMPETIGO: Primary | ICD-10-CM

## 2021-08-02 DIAGNOSIS — S80.869A: ICD-10-CM

## 2021-08-02 PROCEDURE — 99421 OL DIG E/M SVC 5-10 MIN: CPT | Performed by: FAMILY MEDICINE

## 2021-08-02 RX ORDER — MUPIROCIN 20 MG/G
OINTMENT TOPICAL 3 TIMES DAILY
Qty: 30 G | Refills: 0 | Status: SHIPPED | OUTPATIENT
Start: 2021-08-02 | End: 2022-08-22

## 2021-08-03 DIAGNOSIS — I10 BENIGN ESSENTIAL HYPERTENSION: ICD-10-CM

## 2021-08-04 NOTE — TELEPHONE ENCOUNTER
Routing refill request to provider for review/approval because:  BP Readings from Last 3 Encounters:   05/10/21 (!) 152/82   01/21/20 134/76   08/12/19 118/70     Jessica Shipley BSN, RN

## 2021-08-05 RX ORDER — LOSARTAN POTASSIUM 50 MG/1
50 TABLET ORAL DAILY
Qty: 90 TABLET | Refills: 0 | Status: SHIPPED | OUTPATIENT
Start: 2021-08-05 | End: 2021-11-06

## 2021-08-09 DIAGNOSIS — F41.1 ANXIETY STATE: ICD-10-CM

## 2021-08-09 DIAGNOSIS — I10 BENIGN ESSENTIAL HYPERTENSION: ICD-10-CM

## 2021-08-11 RX ORDER — AMLODIPINE BESYLATE 5 MG/1
5 TABLET ORAL DAILY
Qty: 90 TABLET | Refills: 0 | Status: SHIPPED | OUTPATIENT
Start: 2021-08-11 | End: 2021-11-19

## 2021-08-11 RX ORDER — ALPRAZOLAM 0.5 MG
TABLET ORAL
Qty: 30 TABLET | Refills: 0 | Status: SHIPPED | OUTPATIENT
Start: 2021-08-11 | End: 2021-11-04

## 2021-08-23 ENCOUNTER — E-VISIT (OUTPATIENT)
Dept: URGENT CARE | Facility: URGENT CARE | Age: 61
End: 2021-08-23
Payer: COMMERCIAL

## 2021-08-23 ENCOUNTER — LAB (OUTPATIENT)
Dept: LAB | Facility: CLINIC | Age: 61
End: 2021-08-23
Attending: FAMILY MEDICINE
Payer: COMMERCIAL

## 2021-08-23 DIAGNOSIS — Z20.822 CLOSE EXPOSURE TO 2019 NOVEL CORONAVIRUS: Primary | ICD-10-CM

## 2021-08-23 DIAGNOSIS — Z20.822 CLOSE EXPOSURE TO 2019 NOVEL CORONAVIRUS: ICD-10-CM

## 2021-08-23 PROCEDURE — U0005 INFEC AGEN DETEC AMPLI PROBE: HCPCS

## 2021-08-23 PROCEDURE — 99421 OL DIG E/M SVC 5-10 MIN: CPT | Performed by: FAMILY MEDICINE

## 2021-08-23 PROCEDURE — U0003 INFECTIOUS AGENT DETECTION BY NUCLEIC ACID (DNA OR RNA); SEVERE ACUTE RESPIRATORY SYNDROME CORONAVIRUS 2 (SARS-COV-2) (CORONAVIRUS DISEASE [COVID-19]), AMPLIFIED PROBE TECHNIQUE, MAKING USE OF HIGH THROUGHPUT TECHNOLOGIES AS DESCRIBED BY CMS-2020-01-R: HCPCS

## 2021-08-23 NOTE — PATIENT INSTRUCTIONS
"  Dear Gael Le,    Based on your exposure to COVID-19 (coronavirus), we would like to test you for this virus. I have placed an order for this test.The best time for testing is 5-7 days after the exposure.    How to schedule:  Go to your [employer]) home page and scroll down to the section that says  You have an appointment that needs to be scheduled  and click the large green button that says  Schedule Now  and follow the steps to find the next available opening.     If you are unable to complete these [employer] scheduling steps, please call [phone] to schedule your testing.     Return to work/school/ guidance:   For people with high risk exposures outside the home    Please let your workplace manager and staffing office know when your quarantine ends.     We can not give you an exact date as it depends on the information below. You can calculate this on your own or work with your manager/staffing office to calculate this. (For example if you were exposed on 10/4, you would have to quarantine for 14 full days. That would be through 10/18. You could return on 10/19.)    Quarantine Guidelines:  Patients (\"contacts\") who have been in close prolonged contact of an infected person(s) (within six feet for at least 15 minutes within a 24 hour period), and remain asymptomatic should enter quarantine based on the following options:    14-day quarantine period (this remains the CDC recommendation for the greatest protection against spread of COVID-19) OR    Minimum 7-day quarantine with negative RT-PCR test collected on day 5 or later OR    10-day quarantine with no test  Quarantine Guideline exceptions are as follows:    People who have been fully vaccinated do not need to quarantine if the exposure was at least 2 weeks after the last vaccination. This includes vaccinated health care workers.    Not fully vaccinated and unvaccinated Individuals who work in health care, congregate care, or congregate living " should be off work for 14 days from their last date of exposure. Community activities for this group can be resumed based on options above. Fully vaccinated individuals in this group do not need to quarantine from work after exposure.    Not fully vaccinated and unvaccinated people whose high-risk exposure was a household member should always quarantine for 14 days from their last date of exposure. Fully vaccinated people in this category do not need to quarantine.    Not fully vaccinated or unvaccinated residents of congregate care and congregate living settings should always quarantine for 14 days from their last date of exposure. Fully vaccinated residents do not need to quarantine.  Note: If you have ongoing exposure to the covid positive person, this quarantine period may be more than 14 days. (For example, if you are continued to be exposed to your child who tested positive and cannot isolate from them, then the quarantine of 7-14 days can't start until your child is no longer contagious. This is typically 10 days from onset of the child's symptoms. So the total duration may be 17-24 days in this case.)    You should continue symptom monitoring until day 14 post-exposure. If you develop signs or symptoms of COVID-19, isolate and get tested (even if you have been tested already).    How to quarantine:   Stay home and away from others. Don't go to school or anywhere else. Generally quarantine means staying home from work but there are some exceptions to this. Please contact your workplace.  No hugging, kissing or shaking hands.  Don't let anyone visit.  Cover your mouth and nose with a mask, tissue or washcloth to avoid spreading germs.  Wash your hands and face often. Use soap and water.    What are the symptoms of COVID-19?  The most common symptoms are cough, fever and trouble breathing. Less common symptoms include headache, body aches, fatigue (feeling very tired), chills, sore throat, stuffy or runny nose,  diarrhea (loose poop), loss of taste or smell, belly pain, and nausea or vomiting (feeling sick to your stomach or throwing up).  After 14 days, if you have still don't have symptoms, you likely don't have this virus.  If you develop symptoms, follow these guidelines.  If you're normally healthy: Please start another eVisit.  If you have a serious health problem (like cancer, heart failure, an organ transplant or kidney disease): Call your specialty clinic. Let them know that you might have COVID-19.    Where can I get more information?  [facility] - About COVID-19: [email or web address]  CDC - What to Do If You're Sick: www.cdc.gov/coronavirus/2019-ncov/about/steps-when-sick.html  CDC - Ending Home Isolation: www.cdc.gov/coronavirus/2019-ncov/hcp/disposition-in-home-patients.html  CDC - Caring for Someone: www.cdc.gov/coronavirus/2019-ncov/if-you-are-sick/care-for-someone.html  [facility] clinical trials (COVID-19 research studies): clinicalaffairs.[facility]-clinical-trials  Below are the COVID-19 hotlines at the [place] [facility] ([facility]). Interpreters are available.  For health questions: Call 066-762-1888 or 1-145.311.4113 (7 a.m. to 7 p.m.)  For questions about schools and childcare: Call 350-809-1900 or 1-277.924.9334 (7 a.m. to 7 p.m.)

## 2021-08-24 LAB — SARS-COV-2 RNA RESP QL NAA+PROBE: NEGATIVE

## 2021-10-03 ENCOUNTER — HEALTH MAINTENANCE LETTER (OUTPATIENT)
Age: 61
End: 2021-10-03

## 2021-10-06 ENCOUNTER — IMMUNIZATION (OUTPATIENT)
Dept: FAMILY MEDICINE | Facility: CLINIC | Age: 61
End: 2021-10-06
Payer: COMMERCIAL

## 2021-10-06 DIAGNOSIS — Z23 NEED FOR PROPHYLACTIC VACCINATION AND INOCULATION AGAINST INFLUENZA: Primary | ICD-10-CM

## 2021-10-06 PROCEDURE — 90682 RIV4 VACC RECOMBINANT DNA IM: CPT

## 2021-10-06 PROCEDURE — 99207 PR NO CHARGE NURSE ONLY: CPT

## 2021-10-06 PROCEDURE — 90471 IMMUNIZATION ADMIN: CPT

## 2021-10-06 NOTE — PROGRESS NOTES
Prior to immunization administration, verified patients identity using patient s name and date of birth. Please see Immunization Activity for additional information.     Screening Questionnaire for Adult Immunization    Are you sick today?   No   Do you have allergies to medications, food, a vaccine component or latex?   No   Have you ever had a serious reaction after receiving a vaccination?   No   Do you have a long-term health problem with heart, lung, kidney, or metabolic disease (e.g., diabetes), asthma, a blood disorder, no spleen, complement component deficiency, a cochlear implant, or a spinal fluid leak?  Are you on long-term aspirin therapy?   No   Do you have cancer, leukemia, HIV/AIDS, or any other immune system problem?   No   Do you have a parent, brother, or sister with an immune system problem?   No   In the past 3 months, have you taken medications that affect  your immune system, such as prednisone, other steroids, or anticancer drugs; drugs for the treatment of rheumatoid arthritis, Crohn s disease, or psoriasis; or have you had radiation treatments?   No   Have you had a seizure, or a brain or other nervous system problem?   No   During the past year, have you received a transfusion of blood or blood    products, or been given immune (gamma) globulin or antiviral drug?   No   For women: Are you pregnant or is there a chance you could become       pregnant during the next month?   No   Have you received any vaccinations in the past 4 weeks?   No     Immunization questionnaire answers were all negative.        Per orders of Dr. Marroquin, injection of Flublok given by Gaby Manuel. Patient instructed to remain in clinic for 15 minutes afterwards, and to report any adverse reaction to me immediately.       Screening performed by Gaby Manuel on 10/6/2021 at 2:06 PM.

## 2021-10-31 ENCOUNTER — NURSE TRIAGE (OUTPATIENT)
Dept: NURSING | Facility: CLINIC | Age: 61
End: 2021-10-31

## 2021-10-31 NOTE — TELEPHONE ENCOUNTER
He has PAC.  This morning he is feeling an irregular heartbeat, and his I watch says it is in A fib. His wife just checked his pulse and it is irregular.  Care advice is to go to the ED to be evaluated.    Patient verbalized understanding and agrees with plan.    Giovanna Cadena Nurse Triage Advisor 12:25 PM 10/31/2021    Reason for Disposition    [1] Heart beating very rapidly (e.g., > 140 / minute) AND [2] present now  (Exception: during exercise)    Additional Information    Negative: Passed out (i.e., lost consciousness, collapsed and was not responding)    Negative: Shock suspected (e.g., cold/pale/clammy skin, too weak to stand, low BP, rapid pulse)    Negative: Difficult to awaken or acting confused (e.g., disoriented, slurred speech)    Negative: Visible sweat on face or sweat dripping down face    Negative: Unable to walk, or can only walk with assistance (e.g., requires support)    Negative: [1] Received SHOCK from implantable cardiac defibrillator AND [2] persisting symptoms (i.e., palpitations, lightheadedness)    Negative: Dizziness, lightheadedness, or weakness    Negative: Difficulty breathing    Negative: Chest pain    Negative: [1] Dizziness, lightheadedness, or weakness AND [2] heart beating very rapidly (e.g., > 140 / minute)    Negative: [1] Dizziness, lightheadedness, or weakness AND [2] heart beating very slowly  (e.g., < 50 / minute)    Protocols used: HEART RATE AND HEARTBEAT [id number]

## 2021-11-02 DIAGNOSIS — I10 BENIGN ESSENTIAL HYPERTENSION: ICD-10-CM

## 2021-11-03 RX ORDER — LOSARTAN POTASSIUM 50 MG/1
50 TABLET ORAL DAILY
Qty: 90 TABLET | Refills: 0 | OUTPATIENT
Start: 2021-11-03

## 2021-11-03 NOTE — TELEPHONE ENCOUNTER
Routing refill request to provider for review/approval because:  Appointment needed for further refill per MAR        Last blood pressure under 140/90 in past 12 months        BP Readings from Last 3 Encounters:   05/10/21 (!) 152/82   01/21/20 134/76   08/12/19 118/70     Isela Serrano RN, BSN   [facility] [place]

## 2021-11-03 NOTE — TELEPHONE ENCOUNTER
Refused refill. BP high when last in clinic, also very high when in ER over the weekend also noted to have A-fib but self converted.  Needs to be seen in person for further evaluation and refills, recommend this week yet if opening. Okay to take a same day slot.  Please help him schedule an appointment.    Thank you,  KATH Herzog, NP-C  [facility]

## 2021-11-03 NOTE — TELEPHONE ENCOUNTER
Called pt. Had virtual visit set up, but changed this to an in person visit with Dr. Dennis tomorrow at 5 pm.

## 2021-11-04 ENCOUNTER — OFFICE VISIT (OUTPATIENT)
Dept: FAMILY MEDICINE | Facility: CLINIC | Age: 61
End: 2021-11-04
Payer: COMMERCIAL

## 2021-11-04 VITALS
RESPIRATION RATE: 20 BRPM | WEIGHT: 184 LBS | DIASTOLIC BLOOD PRESSURE: 98 MMHG | SYSTOLIC BLOOD PRESSURE: 162 MMHG | OXYGEN SATURATION: 97 % | HEART RATE: 117 BPM | BODY MASS INDEX: 27.17 KG/M2 | TEMPERATURE: 98.6 F

## 2021-11-04 DIAGNOSIS — I48.0 PAROXYSMAL ATRIAL FIBRILLATION (H): Primary | ICD-10-CM

## 2021-11-04 DIAGNOSIS — I10 BENIGN ESSENTIAL HYPERTENSION: ICD-10-CM

## 2021-11-04 DIAGNOSIS — E78.00 HYPERCHOLESTEREMIA: ICD-10-CM

## 2021-11-04 DIAGNOSIS — F41.1 ANXIETY STATE: ICD-10-CM

## 2021-11-04 PROCEDURE — 99214 OFFICE O/P EST MOD 30 MIN: CPT | Performed by: INTERNAL MEDICINE

## 2021-11-04 RX ORDER — METOPROLOL SUCCINATE 25 MG/1
25 TABLET, EXTENDED RELEASE ORAL
COMMUNITY
Start: 2021-11-03 | End: 2022-05-26 | Stop reason: DRUGHIGH

## 2021-11-04 RX ORDER — ALPRAZOLAM 0.5 MG
0.5 TABLET ORAL DAILY PRN
Qty: 30 TABLET | Refills: 0 | Status: SHIPPED | OUTPATIENT
Start: 2021-11-04 | End: 2022-02-07

## 2021-11-04 NOTE — PROGRESS NOTES
"  Assessment & Plan     1.  Paroxysmal atrial fibrillation based on history and one documented evidence of atrial fibrillation that spontaneously converted on October 31, 2021.  Advise he start taking metoprolol XL 25 mg a day prescribed in the ER.  Apixaban was also prescribed which I do not recommend he take.  [id number]-Plef score is 1, hence anticoagulation therapy not needed.  Stroke risks 0.6%.  Event monitor and echocardiogram February 2011 were negative.  I will proceed with echocardiogram and Zio patch for 14 days.  I will get back to the results.  He has an appointment set up with [facility] in early December.  Valsalva maneuver was demonstrated.  Discussed avoiding stimulants so no more than 1 glass of wine a day or 1 cup of coffee a day.  Managing stress discussed.  2.  Benign essential hypertension with good blood pressure readings at home on amlodipine 5 mg a day and losartan 50 mg a day.  Blood pressure elevated today probably related anxiety.  Adding Toprol-XL 25 mg a day will help.  3.  Hypercholesterolemia been treated with atorvastatin 20 mg a day.  4.  Anxiety status.  Refill of lorazepam provided.         BMI:   Estimated body mass index is 27.17 kg/m  as calculated from the following:    Height as of 5/10/21: 1.753 m (5' 9\").    Weight as of this encounter: 83.5 kg (184 lb).   Weight management plan: Discussed healthy diet and exercise guidelines        No follow-ups on file.    Madan Dennis MD  [facility] CANDACE Mayo is a 61 year old who presents for the following health issues     History of Present Illness       Vascular Disease:  He presents for follow up of vascular disease.  He never takes nitroglycerin. He is not taking daily aspirin.    He eats 0-1 servings of fruits and vegetables daily.He consumes 1 sweetened beverage(s) daily.He exercises with enough effort to increase his heart rate 30 to 60 minutes per day.  He exercises with " enough effort to increase his heart rate 4 days per week.   He is taking medications regularly.       Pt was in the ER on 10/31 due to long term fluttering chest, was diagnosed with A fib. Pt has questions and concerns regarding diagnosis       60-year-old gentleman with history of hypertension, hyperlipidemia and anxiety disorder woke up on October 31, 2021 with fluttering and rapid heartbeat.  He went to the emergency room and was diagnosed with atrial fibrillation with a fast ventricular rate in the 160 range by EKG.  While in ER he spontaneously converted to normal sinus rhythm.  Gives history of having several of palpitation in 2011 but by the time he went to the ER he was in sinus rhythm.  Work-up at that time included echocardiogram and event monitor which were negative.    He is feeling little anxious today but otherwise he is fine.  No palpitation no shortness of breath.  He never had chest pain.  Lab performed in the ER on October 31, 2021 showed normal TSH, CBC BMP.      Review of Systems   Constitutional, HEENT, cardiovascular, pulmonary, GI, , musculoskeletal, neuro, skin, endocrine and psych systems are negative, except as otherwise noted.      Objective    There were no vitals taken for this visit.  There is no height or weight on file to calculate BMI.  Physical Exam   GENERAL: healthy, alert and no distress  NECK: no adenopathy, no asymmetry, masses, or scars and thyroid normal to palpation  RESP: lungs clear to auscultation - no rales, rhonchi or wheezes  CV: regular rate and rhythm, normal S1 S2, no S3 or S4, no murmur, click or rub, no peripheral edema and peripheral pulses strong  ABDOMEN: soft, nontender, no hepatosplenomegaly, no masses and bowel sounds normal  MS: no gross musculoskeletal defects noted, no edema

## 2021-11-05 DIAGNOSIS — E78.2 MIXED HYPERLIPIDEMIA: ICD-10-CM

## 2021-11-05 RX ORDER — ATORVASTATIN CALCIUM 20 MG/1
20 TABLET, FILM COATED ORAL DAILY
Qty: 90 TABLET | Refills: 1 | Status: SHIPPED | OUTPATIENT
Start: 2021-11-05 | End: 2022-05-06

## 2021-11-06 ENCOUNTER — MYC REFILL (OUTPATIENT)
Dept: FAMILY MEDICINE | Facility: CLINIC | Age: 61
End: 2021-11-06
Payer: COMMERCIAL

## 2021-11-06 DIAGNOSIS — I10 BENIGN ESSENTIAL HYPERTENSION: ICD-10-CM

## 2021-11-06 RX ORDER — LOSARTAN POTASSIUM 50 MG/1
50 TABLET ORAL DAILY
Qty: 90 TABLET | Refills: 0 | Status: CANCELLED | OUTPATIENT
Start: 2021-11-06

## 2021-11-07 DIAGNOSIS — I10 BENIGN ESSENTIAL HYPERTENSION: ICD-10-CM

## 2021-11-08 RX ORDER — LOSARTAN POTASSIUM 50 MG/1
50 TABLET ORAL DAILY
Qty: 90 TABLET | Refills: 0 | Status: SHIPPED | OUTPATIENT
Start: 2021-11-08 | End: 2022-02-09

## 2021-11-08 NOTE — TELEPHONE ENCOUNTER
Routing refill request to provider for review/approval because:  B/P out of range per protocol       Last blood pressure under 140/90 in past 12 months        BP Readings from Last 3 Encounters:   11/04/21 (!) 162/98   05/10/21 (!) 152/82   01/21/20 134/76     Isela Serrano RN, BSN   [facility] [place]

## 2021-11-09 RX ORDER — LOSARTAN POTASSIUM 50 MG/1
50 TABLET ORAL DAILY
Qty: 90 TABLET | Refills: 0 | OUTPATIENT
Start: 2021-11-09

## 2021-11-11 NOTE — TELEPHONE ENCOUNTER
"See multiple MyChart messages in this encounter.    Pt indicates that he is in [place] now, did not refill the available losartan order that is at the [place] [facility].  Pt states he took last losartan pill yesterday, and does not return to [place] until 11/16/21.    Pt inquires, \"How essential is getting to Losartan refill?\"    KAMILA LindaN, RN    "

## 2021-11-24 ENCOUNTER — ANCILLARY PROCEDURE (OUTPATIENT)
Dept: CARDIOLOGY | Facility: CLINIC | Age: 61
End: 2021-11-24
Attending: INTERNAL MEDICINE
Payer: COMMERCIAL

## 2021-11-24 DIAGNOSIS — I48.0 PAROXYSMAL ATRIAL FIBRILLATION (H): ICD-10-CM

## 2021-11-24 LAB — LVEF ECHO: NORMAL

## 2021-11-24 PROCEDURE — 93306 TTE W/DOPPLER COMPLETE: CPT | Performed by: INTERNAL MEDICINE

## 2021-11-24 PROCEDURE — 93245 EXT ECG>7D<15D REC SCAN A/R: CPT | Performed by: INTERNAL MEDICINE

## 2021-11-24 NOTE — PATIENT INSTRUCTIONS
Patient has been prescribed a ZioPatch holter for 14 days.  Patient was instructed regarding the indication, function, care and prompt return of the ZioPatch holter monitor. The monitor, with S/N [id number],  was placed on the patient with instructions regarding care of the skin, electrodes, and monitor, as well as documentation in the patient diary. Patient demonstrated understanding of this information and agreed to call [employer] with further questions or concerns.

## 2022-02-09 DIAGNOSIS — I10 BENIGN ESSENTIAL HYPERTENSION: ICD-10-CM

## 2022-02-09 RX ORDER — LOSARTAN POTASSIUM 50 MG/1
TABLET ORAL
Qty: 90 TABLET | Refills: 0 | Status: SHIPPED | OUTPATIENT
Start: 2022-02-09 | End: 2022-08-22

## 2022-02-09 NOTE — TELEPHONE ENCOUNTER
"Routing refill request to provider for review/approval because:  BP Readings from Last 3 Encounters:   11/04/21 (!) 162/98   05/10/21 (!) 152/82   01/21/20 134/76     Requested Prescriptions   Pending Prescriptions Disp Refills    losartan (COZAAR) 50 MG tablet [Pharmacy Med Name: LOSARTAN POTASSIUM 50 MG TAB] 90 tablet 0     Sig: TAKE 1 TABLET BY MOUTH EVERY DAY        Angiotensin-II Receptors Failed - 2/9/2022 12:34 AM        Failed - Last blood pressure under 140/90 in past 12 months       BP Readings from Last 3 Encounters:   11/04/21 (!) 162/98   05/10/21 (!) 152/82   01/21/20 134/76                 Passed - Recent (12 mo) or future (30 days) visit within the authorizing provider's specialty     Patient has had an office visit with the authorizing provider or a provider within the authorizing providers department within the previous 12 mos or has a future within next 30 days. See \"Patient Info\" tab in inbasket, or \"Choose Columns\" in Meds & Orders section of the refill encounter.              Passed - Medication is active on med list        Passed - Patient is age 18 or older        Passed - Normal serum creatinine on file in past 12 months     Recent Labs   Lab Test 05/13/21  0830   CR 0.82       Ok to refill medication if creatinine is low          Passed - Normal serum potassium on file in past 12 months       Recent Labs   Lab Test 05/13/21  0830   POTASSIUM 3.8                        Grace Hussein RN, BSN  [facility]        "

## 2022-02-20 DIAGNOSIS — I10 BENIGN ESSENTIAL HYPERTENSION: ICD-10-CM

## 2022-02-22 RX ORDER — AMLODIPINE BESYLATE 5 MG/1
5 TABLET ORAL DAILY
Qty: 90 TABLET | Refills: 0 | Status: SHIPPED | OUTPATIENT
Start: 2022-02-22 | End: 2022-05-27

## 2022-04-10 ENCOUNTER — MYC MEDICAL ADVICE (OUTPATIENT)
Dept: FAMILY MEDICINE | Facility: CLINIC | Age: 62
End: 2022-04-10
Payer: COMMERCIAL

## 2022-04-10 DIAGNOSIS — Z12.11 COLON CANCER SCREENING: Primary | ICD-10-CM

## 2022-04-11 NOTE — TELEPHONE ENCOUNTER
To provider to review orders for colonoscopy.     Thank you,   Grace Hussein RN, BSN  [facility]

## 2022-04-22 ENCOUNTER — TELEPHONE (OUTPATIENT)
Dept: GASTROENTEROLOGY | Facility: CLINIC | Age: 62
End: 2022-04-22
Payer: COMMERCIAL

## 2022-04-22 NOTE — TELEPHONE ENCOUNTER
Screening Questions  BlueKIND OF PREP RedLOCATION [review exclusion criteria] GreenSEDATION TYPE  1. Have you had a positive covid test in the last 90 days? N    2. Do you have a legal guardian or medical Power of ?  Are you able to give consent for your medical care?Yes (Sedation review/consideration needed)    3. Are you active on mychart? Yes    4. What insurance is in the chart? BCBS     3.   Ordering/Referring Provider: Cara    4. BMI 26.0 [BMI OVER 40-EXTENDED PREP]  If greater than 40 review exclusion criteria [PAC APPT IF @ [facility]]        5.  Respiratory Screening :  [If yes to any of the following HOSPITAL setting only]     Do you use daily home oxygen? N    Do you have mod to severe Obstructive Sleep Apnea? N  [OKAY @ [facility] SH PH RI]   Do you have Pulmonary Hypertension? N     Do you have UNCONTROLLED asthma? N        6.   Have you had a heart or lung transplant? N      7.   Are you currently on dialysis? N [ If yes, G-PREP & HOSPITAL setting only]     8.   Do you have chronic kidney disease? N [ If yes, G-PREP ]    9.   Have you had a stroke or Transient ischemic attack (TIA - aka  mini stroke ) within 6 months?  N (If yes, please review exclusion criteria)    10.   In the past 6 months, have you had any heart related issues including cardiomyopathy or heart attack? N           If yes, did it require cardiac stenting or other implantable device? N      11.   Do you have any implantable devices in your body (pacemaker, defib, LVAD)? N (If yes, please review exclusion criteria)    12.   Do you take nitroglycerin? N           If yes, how often? NA  (if yes, HOSPITAL setting ONLY)    13.   Are you currently taking any blood thinners? N           [IF YES, INFORM PATIENT TO FOLLOW UP W/ ORDERING PROVIDER FOR BRIDGING INSTRUCTIONS]     14.   Do you have a diagnosis of diabetes? N   [ If yes, G-PREP ]    15.   [FEMALES] Are you currently pregnant? NA    If yes, how many weeks? NA    16.   Are you  taking any prescription pain medications on a routine schedule?  N  [ If yes, EXTENDED PREP.] [If yes, MAC]    17.   Do you have any chemical dependencies such as alcohol, street drugs, or methadone?  Yes-drinks 3 to 4 glasses of wine a day [If yes, MAC]    18.   Do you have any history of post-traumatic stress syndrome, severe anxiety or history of psychosis?  Yes  [If yes, MAC]  Patient had CS for last colonoscopy and does prefer it.  He stated that it would cause him more anxiety to go to sleep    19.   Do you transfer independently?  Yes    20.  On a regular basis do you go 3-5 days between bowel movements? N   [ If yes, EXTENDED PREP.]    21.   Preferred LOCAL Pharmacy for Pre Prescription        [id number] - [place], [place] - [address] [place] [address]      Scheduling Details      Caller : Gael  (Please ask for phone number if not scheduled by patient)    Type of Procedure Scheduled: Colon  Which Colonoscopy Prep was Sent?: Miralax  KHORUTS CF PATIENTS & GROEN'S PATIENTS NEEDS EXTENDED PREP  Surgeon: Homar  Date of Procedure: 6/13/22  Location:       Sedation Type: CS  Conscious Sedation- Needs  for 6 hours after the procedure  MAC/General-Needs  for 24 hours after procedure    Pre-op Required at [facility], [place], [place] and OR for MAC sedation: NA  (advise patient they will need a pre-op prior to procedure -)      Informed patient they will need an adult  Yes  Cannot take any type of public or medical transportation alone    Pre-Procedure Covid test to be completed at [facility] or Externally: MG 6/9/22    Confirmed Nurse will call to complete assessment Yes    Additional comments: Patient is a hard stick and they had a hard time with IV the last time.

## 2022-05-14 DIAGNOSIS — F41.1 ANXIETY STATE: ICD-10-CM

## 2022-05-16 RX ORDER — ALPRAZOLAM 0.5 MG
TABLET ORAL
Qty: 30 TABLET | Refills: 0 | Status: SHIPPED | OUTPATIENT
Start: 2022-05-16 | End: 2022-07-13

## 2022-05-16 NOTE — TELEPHONE ENCOUNTER
Routing refill request to provider for review/approval because:  Drug not on the [facility] refill protocol. Patients last refill 4/6/22 with a 30 day supply   Isela Serrano RN, BSN   [facility]

## 2022-05-26 ENCOUNTER — VIRTUAL VISIT (OUTPATIENT)
Dept: FAMILY MEDICINE | Facility: CLINIC | Age: 62
End: 2022-05-26
Payer: COMMERCIAL

## 2022-05-26 DIAGNOSIS — I48.91 ATRIAL FIBRILLATION WITH RVR (H): ICD-10-CM

## 2022-05-26 DIAGNOSIS — I10 BENIGN ESSENTIAL HYPERTENSION: ICD-10-CM

## 2022-05-26 DIAGNOSIS — I48.0 PAROXYSMAL ATRIAL FIBRILLATION (H): ICD-10-CM

## 2022-05-26 DIAGNOSIS — F41.9 ANXIETY: Primary | ICD-10-CM

## 2022-05-26 DIAGNOSIS — N52.9 ERECTILE DYSFUNCTION, UNSPECIFIED ERECTILE DYSFUNCTION TYPE: ICD-10-CM

## 2022-05-26 PROCEDURE — 96127 BRIEF EMOTIONAL/BEHAV ASSMT: CPT | Mod: 95 | Performed by: FAMILY MEDICINE

## 2022-05-26 PROCEDURE — 99215 OFFICE O/P EST HI 40 MIN: CPT | Mod: 95 | Performed by: FAMILY MEDICINE

## 2022-05-26 RX ORDER — LOSARTAN POTASSIUM AND HYDROCHLOROTHIAZIDE 12.5; 5 MG/1; MG/1
1 TABLET ORAL DAILY
COMMUNITY
Start: 2022-05-26

## 2022-05-26 RX ORDER — ESCITALOPRAM OXALATE 10 MG/1
10 TABLET ORAL DAILY
Qty: 30 TABLET | Refills: 1 | Status: SHIPPED | OUTPATIENT
Start: 2022-05-26 | End: 2022-08-22 | Stop reason: SINTOL

## 2022-05-26 RX ORDER — METOPROLOL SUCCINATE 50 MG/1
50 TABLET, EXTENDED RELEASE ORAL DAILY
COMMUNITY
Start: 2022-05-26 | End: 2022-08-22 | Stop reason: DRUGHIGH

## 2022-05-26 ASSESSMENT — ENCOUNTER SYMPTOMS: NERVOUS/ANXIOUS: 1

## 2022-05-26 ASSESSMENT — ANXIETY QUESTIONNAIRES
GAD7 TOTAL SCORE: 16
1. FEELING NERVOUS, ANXIOUS, OR ON EDGE: NEARLY EVERY DAY
GAD7 TOTAL SCORE: 16
2. NOT BEING ABLE TO STOP OR CONTROL WORRYING: NEARLY EVERY DAY
5. BEING SO RESTLESS THAT IT IS HARD TO SIT STILL: MORE THAN HALF THE DAYS
7. FEELING AFRAID AS IF SOMETHING AWFUL MIGHT HAPPEN: MORE THAN HALF THE DAYS
7. FEELING AFRAID AS IF SOMETHING AWFUL MIGHT HAPPEN: MORE THAN HALF THE DAYS
6. BECOMING EASILY ANNOYED OR IRRITABLE: NOT AT ALL
4. TROUBLE RELAXING: NEARLY EVERY DAY
GAD7 TOTAL SCORE: 16
3. WORRYING TOO MUCH ABOUT DIFFERENT THINGS: NEARLY EVERY DAY
8. IF YOU CHECKED OFF ANY PROBLEMS, HOW DIFFICULT HAVE THESE MADE IT FOR YOU TO DO YOUR WORK, TAKE CARE OF THINGS AT HOME, OR GET ALONG WITH OTHER PEOPLE?: SOMEWHAT DIFFICULT

## 2022-05-26 ASSESSMENT — PATIENT HEALTH QUESTIONNAIRE - PHQ9: SUM OF ALL RESPONSES TO PHQ QUESTIONS 1-9: 2

## 2022-05-26 NOTE — TELEPHONE ENCOUNTER
Routing refill request to provider for review/approval because:  Labs not current:  creatinine  BP Readings from Last 3 Encounters:   11/04/21 (!) 162/98   05/10/21 (!) 152/82   01/21/20 134/76     Appointments in Next Year      May 26, 2022  5:20 PM  (Arrive by 5:00 PM)  Provider Visit with Idania Marroquin MD  [facility] ([facility] ) [phone]     Aug 22, 2022  4:00 PM  (Arrive by 3:40 PM)  PHYSICAL with Idania Marroquin MD  [facility] ([facility] ) [phone]          Grace Hussein RN, BSN  [facility]

## 2022-05-26 NOTE — PATIENT INSTRUCTIONS
Begin Lexapro 10 mg daily in AM.     Use of alcohol with this medication may result in more significant effect on motor activities than you would expect without the medication.    Ok to go to 75 mg metoprolol for 2 weeks prior to increase to 100 mg daily.

## 2022-05-26 NOTE — PROGRESS NOTES
Gael is a 61 year old who is being evaluated via a billable video visit.      How would you like to obtain your AVS? MyChart  If the video visit is dropped, the invitation should be resent by: Text to cell phone: [phone]  Will anyone else be joining your video visit? No    Video Start Time: 5:20 PM    Assessment & Plan     Anxiety  Anxiety that is situational for the most part but frequent and hindering regular activities.  Discussed not mixing at same time as alcohol.  Decrease alcohol consumption with the use of lexapro due to increase in psychomotor impairment on the medication.  Follow up response in 4 weeks.   - escitalopram (LEXAPRO) 10 MG tablet; Take 1 tablet (10 mg) by mouth daily    Atrial fibrillation with RVR (H)  Paroxysmal atrial fibrillation (H)  Ongoing care with cardiology.  Increase in metoprolol recommended but he is still taking the 50 mg dose.  Concerned about going to 100 mg but may go to 75 mg dose for short term before going to 100 mg.       Benign essential hypertension  BP's at home 130-150/80-90's except after alcohol 100/70.  Has follow up with cardiology in September.    ED  lexapro unlikely to complicate this treatment.  May improve if less anxiety for performance.  Ok to use viagra or tadalafil for treatment.       Review of prior external note(s) from - [facility] information from [employer] and [facility]  reviewed  Review of the result(s) of each unique test - TSH, testosterone, ECHO  Ordering of each unique test  Prescription drug management  50 minutes spent on the date of the encounter doing chart review, review of outside records, review of test results, patient visit and documentation        Patient Instructions   Begin Lexapro 10 mg daily in AM.     Use of alcohol with this medication may result in more significant effect on motor activities than you would expect without the medication.    Ok to go to 75 mg metoprolol for 2 weeks prior to increase to 100 mg daily.              Return in about 4 weeks (around 6/23/2022) for recheck mood.    Idania Marroquin MD  [facility] CANDACE Mayo is a 61 year old who presents for the following health issues     Anxiety    History of Present Illness       Mental Health Follow-up:  Patient presents to follow-up on Anxiety.    Patient's anxiety since last visit has been:  No change  The patient is not having other symptoms associated with anxiety.  Any significant life events: health concerns  Patient is feeling anxious or having panic attacks.  Patient has concerns about alcohol or drug use.    He eats 0-1 servings of fruits and vegetables daily.He consumes 2 sweetened beverage(s) daily. He exercises with enough effort to increase his heart rate 6 days per week.   He is taking medications regularly.  Today's MIRIAN-7 Score: 16     PHQ 5/10/2021 5/26/2022   PHQ-9 Total Score 0 2   Q9: Thoughts of better off dead/self-harm past 2 weeks Not at all Not at all     MIRIAN-7 SCORE 10/12/2020 5/10/2021 5/26/2022   Total Score - - 16 (severe anxiety)   Total Score 18 13 16     Personal life and world situation triggers anxiety.  Panic episodes rare, more just underlying anxiety.    Perseverates on the symptoms if he develops any concerning symptoms.     Seeing Dr. Morris for ED and she recommended he follow up for anxiety symptoms.  He has been given the lexapro in past but did not take it.  Has had fluoxetine prescribed as well but did not use either.  Wondering if the lexapro will have a negative effect on ED.  His urologist thinks anxiety may be contributing to ED.  Discussed decreased libido with medication some 3% risk for impotence.     Hypertension Follow-up      Do you check your blood pressure regularly outside of the clinic? Yes     Are you following a low salt diet? Yes    Are your blood pressures ever more than 140 on the top number (systolic) OR more   than 90 on the bottom number (diastolic), for example  140/90? Yes    Paroxysmal A fibrillation   Has intermittent symptoms.      Review of Systems   Psychiatric/Behavioral: The patient is nervous/anxious.       Constitutional, HEENT, cardiovascular, pulmonary, gi and gu systems are negative, except as otherwise noted.      Objective    Vitals - Patient Reported  Systolic (Patient Reported): 135  Diastolic (Patient Reported): 87  Pain Score: No Pain (0)    Occasional irregular heartbeat on his Apple Watch noted.    Physical Exam   GENERAL: Healthy, alert and no distress  EYES: Eyes grossly normal to inspection.  No discharge or erythema, or obvious scleral/conjunctival abnormalities.  RESP: No audible wheeze, cough, or visible cyanosis.  No visible retractions or increased work of breathing.    SKIN: Visible skin clear. No significant rash, abnormal pigmentation or lesions.  NEURO: Cranial nerves grossly intact.  Mentation and speech appropriate for age.  PSYCH: mentation appears normal, affect normal/bright, anxious, judgement and insight intact and appearance well groomed                Video-Visit Details    Type of service:  Video Visit    Video End Time:6:01 PM    Originating Location (pt. Location): Home    Distant Location (provider location):  [facility]     Platform used for Video Visit: [employer]

## 2022-05-27 RX ORDER — AMLODIPINE BESYLATE 5 MG/1
5 TABLET ORAL DAILY
Qty: 90 TABLET | Refills: 1 | Status: SHIPPED | OUTPATIENT
Start: 2022-05-27 | End: 2022-11-22

## 2022-06-04 ENCOUNTER — TELEPHONE (OUTPATIENT)
Dept: NURSING | Facility: CLINIC | Age: 62
End: 2022-06-04
Payer: COMMERCIAL

## 2022-06-04 NOTE — TELEPHONE ENCOUNTER
Outreach to patient to discuss COVID testing for upcoming procedure.     Spoke with: patient  At this time if you are having a same day procedure you can complete an at home COVID test up to 4 days before your procedure and take a picture of the results to bring with you day of procedure.      Patient will complete testing outside of [facility]. No additional needs at this time.  Requesting to do Covid home test for screening.     Lashay Titus LPN

## 2022-06-07 ENCOUNTER — MYC MEDICAL ADVICE (OUTPATIENT)
Dept: FAMILY MEDICINE | Facility: CLINIC | Age: 62
End: 2022-06-07
Payer: COMMERCIAL

## 2022-06-07 NOTE — TELEPHONE ENCOUNTER
Routing to provider to review/advise.     See other My Chart message sent today as well with clarification.     Grace Hussein RN, BSN  [facility]

## 2022-06-13 ENCOUNTER — HOSPITAL ENCOUNTER (OUTPATIENT)
Facility: AMBULATORY SURGERY CENTER | Age: 62
Discharge: HOME | End: 2022-06-13
Attending: SURGERY | Admitting: SURGERY
Payer: COMMERCIAL

## 2022-06-13 VITALS
RESPIRATION RATE: 16 BRPM | HEART RATE: 102 BPM | OXYGEN SATURATION: 94 % | DIASTOLIC BLOOD PRESSURE: 96 MMHG | SYSTOLIC BLOOD PRESSURE: 143 MMHG | TEMPERATURE: 98.5 F

## 2022-06-13 LAB — COLONOSCOPY: NORMAL

## 2022-06-13 PROCEDURE — 99152 MOD SED SAME PHYS/QHP 5/>YRS: CPT | Mod: 59 | Performed by: SURGERY

## 2022-06-13 PROCEDURE — G8907 PT DOC NO EVENTS ON DISCHARG: HCPCS

## 2022-06-13 PROCEDURE — 45378 DIAGNOSTIC COLONOSCOPY: CPT

## 2022-06-13 PROCEDURE — G8918 PT W/O PREOP ORDER IV AB PRO: HCPCS

## 2022-06-13 PROCEDURE — G0105 COLORECTAL SCRN; HI RISK IND: HCPCS | Performed by: SURGERY

## 2022-06-13 RX ORDER — FENTANYL CITRATE 50 UG/ML
INJECTION, SOLUTION INTRAMUSCULAR; INTRAVENOUS PRN
Status: DISCONTINUED | OUTPATIENT
Start: 2022-06-13 | End: 2022-06-13 | Stop reason: HOSPADM

## 2022-06-13 RX ORDER — PROCHLORPERAZINE MALEATE 10 MG
10 TABLET ORAL EVERY 6 HOURS PRN
Status: DISCONTINUED | OUTPATIENT
Start: 2022-06-13 | End: 2022-06-14 | Stop reason: HOSPADM

## 2022-06-13 RX ORDER — FLUMAZENIL 0.1 MG/ML
0.2 INJECTION, SOLUTION INTRAVENOUS
Status: ACTIVE | OUTPATIENT
Start: 2022-06-13 | End: 2022-06-13

## 2022-06-13 RX ORDER — NALOXONE HYDROCHLORIDE 0.4 MG/ML
0.4 INJECTION, SOLUTION INTRAMUSCULAR; INTRAVENOUS; SUBCUTANEOUS
Status: DISCONTINUED | OUTPATIENT
Start: 2022-06-13 | End: 2022-06-14 | Stop reason: HOSPADM

## 2022-06-13 RX ORDER — NALOXONE HYDROCHLORIDE 0.4 MG/ML
0.2 INJECTION, SOLUTION INTRAMUSCULAR; INTRAVENOUS; SUBCUTANEOUS
Status: DISCONTINUED | OUTPATIENT
Start: 2022-06-13 | End: 2022-06-14 | Stop reason: HOSPADM

## 2022-06-13 RX ORDER — ONDANSETRON 2 MG/ML
4 INJECTION INTRAMUSCULAR; INTRAVENOUS EVERY 6 HOURS PRN
Status: DISCONTINUED | OUTPATIENT
Start: 2022-06-13 | End: 2022-06-14 | Stop reason: HOSPADM

## 2022-06-13 RX ORDER — LIDOCAINE 40 MG/G
CREAM TOPICAL
Status: DISCONTINUED | OUTPATIENT
Start: 2022-06-13 | End: 2022-06-14 | Stop reason: HOSPADM

## 2022-06-13 RX ORDER — ONDANSETRON 4 MG/1
4 TABLET, ORALLY DISINTEGRATING ORAL EVERY 6 HOURS PRN
Status: DISCONTINUED | OUTPATIENT
Start: 2022-06-13 | End: 2022-06-14 | Stop reason: HOSPADM

## 2022-06-13 NOTE — H&P
ENDOSCOPY PRE-SEDATION H&P FOR OUTPATIENT PROCEDURES    Gael Le  [id number]  1960    Procedure:   Colonoscopy possible biopsy, possible polypectomy, with moderate sedation.     Pre-procedure diagnosis: history of polyps    Past medical history:   Past Medical History:   Diagnosis Date     Arthritis     mild in knees and feet     Hypertension      Nontoxic multinodular goiter      Paroxysmal atrial fibrillation (H) 11/04/2021     Supraventricular premature beats        Past surgical history:   Past Surgical History:   Procedure Laterality Date     ABDOMEN SURGERY  1968    Hernia     BIOPSY  1993    Bone Cyst - none cancerous     COLONOSCOPY  11/21/2016    4 small polyps, some pockets     COLONOSCOPY WITH CO2 INSUFFLATION N/A 11/21/2016    Procedure: COLONOSCOPY WITH CO2 INSUFFLATION;  Surgeon: Adeel Graf MD;  Location: [facility] OR     FINGER SURGERY       HERNIA REPAIR       ORTHOPEDIC SURGERY  1993    Bone cyst in Right Ring finger       Current Outpatient Medications   Medication     ALPRAZolam (XANAX) 0.5 MG tablet     amLODIPine (NORVASC) 5 MG tablet     atorvastatin (LIPITOR) 20 MG tablet     escitalopram (LEXAPRO) 10 MG tablet     losartan (COZAAR) 50 MG tablet     losartan-hydrochlorothiazide (HYZAAR) 50-12.5 MG tablet     metoprolol succinate ER (TOPROL XL) 50 MG 24 hr tablet     Multiple Vitamin (MULTI-VITAMIN DAILY PO)     mupirocin (BACTROBAN) 2 % external ointment     omeprazole (PRILOSEC) 20 MG DR capsule     tadalafil (CIALIS) 5 MG tablet     Current Facility-Administered Medications   Medication     lidocaine (LMX4) kit     lidocaine 1 % 0.1-1 mL     sodium chloride (PF) 0.9% PF flush 3 mL     sodium chloride (PF) 0.9% PF flush 3 mL       Allergies   Allergen Reactions     Amoxicillin Hives     Lisinopril-Hydrochlorothiazide Cough     Pcn [Penicillins] Hives       History of Anesthesia/Sedation Problems: no    Physical Exam:    Mental status: alert  Heart: Normal  Lung:  Normal  Assessment of patient's airway: Normal  Other as pertinent for procedure: None     ASA Score: See Provation note    Mallampati score:  I - Faucial pillars, soft palate, and uvula are visible    Assessment/Plan:     The patient is an appropriate candidate to receive sedation.    Informed consent was discussed with the patient/family, including the risks, benefits, potential complications and any alternative options associated with sedation.    Patient assessment completed just prior to sedation and while under constant observation by the provider. Condition determined to be adequate for proceeding with sedation.    The specific risks for the procedure were discussed with the patient at the time of informed consent and include but are not limited to perforation which could require surgery, missing significant neoplasm or lesion, hemorrhage and adverse sedative complication.      Adeel Graf MD

## 2022-06-28 ENCOUNTER — MYC MEDICAL ADVICE (OUTPATIENT)
Dept: FAMILY MEDICINE | Facility: CLINIC | Age: 62
End: 2022-06-28

## 2022-06-28 ASSESSMENT — ANXIETY QUESTIONNAIRES
6. BECOMING EASILY ANNOYED OR IRRITABLE: NOT AT ALL
7. FEELING AFRAID AS IF SOMETHING AWFUL MIGHT HAPPEN: SEVERAL DAYS
1. FEELING NERVOUS, ANXIOUS, OR ON EDGE: SEVERAL DAYS
GAD7 TOTAL SCORE: 5
3. WORRYING TOO MUCH ABOUT DIFFERENT THINGS: SEVERAL DAYS
5. BEING SO RESTLESS THAT IT IS HARD TO SIT STILL: NOT AT ALL
GAD7 TOTAL SCORE: 5
8. IF YOU CHECKED OFF ANY PROBLEMS, HOW DIFFICULT HAVE THESE MADE IT FOR YOU TO DO YOUR WORK, TAKE CARE OF THINGS AT HOME, OR GET ALONG WITH OTHER PEOPLE?: NOT DIFFICULT AT ALL
4. TROUBLE RELAXING: SEVERAL DAYS
GAD7 TOTAL SCORE: 5
2. NOT BEING ABLE TO STOP OR CONTROL WORRYING: SEVERAL DAYS
7. FEELING AFRAID AS IF SOMETHING AWFUL MIGHT HAPPEN: SEVERAL DAYS

## 2022-06-28 ASSESSMENT — PATIENT HEALTH QUESTIONNAIRE - PHQ9
10. IF YOU CHECKED OFF ANY PROBLEMS, HOW DIFFICULT HAVE THESE PROBLEMS MADE IT FOR YOU TO DO YOUR WORK, TAKE CARE OF THINGS AT HOME, OR GET ALONG WITH OTHER PEOPLE: NOT DIFFICULT AT ALL
SUM OF ALL RESPONSES TO PHQ QUESTIONS 1-9: 0
SUM OF ALL RESPONSES TO PHQ QUESTIONS 1-9: 0

## 2022-06-28 NOTE — TELEPHONE ENCOUNTER
PHQ 5/10/2021 5/26/2022 6/28/2022   PHQ-9 Total Score 0 2 0   Q9: Thoughts of better off dead/self-harm past 2 weeks Not at all Not at all Not at all     MIRIAN-7 SCORE 5/10/2021 5/26/2022 6/28/2022   Total Score - 16 (severe anxiety) 5 (mild anxiety)   Total Score 13 16 5

## 2022-07-10 ENCOUNTER — HEALTH MAINTENANCE LETTER (OUTPATIENT)
Age: 62
End: 2022-07-10

## 2022-07-12 DIAGNOSIS — F41.1 ANXIETY STATE: ICD-10-CM

## 2022-07-13 RX ORDER — ALPRAZOLAM 0.5 MG
TABLET ORAL
Qty: 30 TABLET | Refills: 0 | Status: SHIPPED | OUTPATIENT
Start: 2022-07-13 | End: 2022-09-13

## 2022-08-02 DIAGNOSIS — E78.2 MIXED HYPERLIPIDEMIA: ICD-10-CM

## 2022-08-02 RX ORDER — ATORVASTATIN CALCIUM 20 MG/1
TABLET, FILM COATED ORAL
Qty: 90 TABLET | Refills: 0 | Status: SHIPPED | OUTPATIENT
Start: 2022-08-02 | End: 2022-11-10

## 2022-08-19 ASSESSMENT — ENCOUNTER SYMPTOMS
MYALGIAS: 1
NAUSEA: 0
SHORTNESS OF BREATH: 0
FEVER: 0
DIARRHEA: 1
HEARTBURN: 0
ABDOMINAL PAIN: 0
ARTHRALGIAS: 0
DIZZINESS: 0
NERVOUS/ANXIOUS: 1
HEMATURIA: 0
PALPITATIONS: 0
WEAKNESS: 0
PARESTHESIAS: 0
DYSURIA: 0
COUGH: 1
JOINT SWELLING: 0
CONSTIPATION: 0
FREQUENCY: 0
HEMATOCHEZIA: 0
SORE THROAT: 0
HEADACHES: 0
EYE PAIN: 0
CHILLS: 0

## 2022-08-22 ENCOUNTER — OFFICE VISIT (OUTPATIENT)
Dept: FAMILY MEDICINE | Facility: CLINIC | Age: 62
End: 2022-08-22
Payer: COMMERCIAL

## 2022-08-22 VITALS
OXYGEN SATURATION: 99 % | SYSTOLIC BLOOD PRESSURE: 164 MMHG | DIASTOLIC BLOOD PRESSURE: 82 MMHG | HEIGHT: 69 IN | WEIGHT: 178.4 LBS | BODY MASS INDEX: 26.42 KG/M2 | HEART RATE: 79 BPM | TEMPERATURE: 98.6 F

## 2022-08-22 DIAGNOSIS — Z00.00 ROUTINE GENERAL MEDICAL EXAMINATION AT A HEALTH CARE FACILITY: Primary | ICD-10-CM

## 2022-08-22 DIAGNOSIS — I48.0 PAROXYSMAL ATRIAL FIBRILLATION (H): ICD-10-CM

## 2022-08-22 DIAGNOSIS — Z13.1 SCREENING FOR DIABETES MELLITUS: ICD-10-CM

## 2022-08-22 DIAGNOSIS — I10 BENIGN ESSENTIAL HYPERTENSION: ICD-10-CM

## 2022-08-22 DIAGNOSIS — E04.2 NONTOXIC MULTINODULAR GOITER: ICD-10-CM

## 2022-08-22 DIAGNOSIS — E78.00 HYPERCHOLESTEREMIA: ICD-10-CM

## 2022-08-22 PROCEDURE — 99396 PREV VISIT EST AGE 40-64: CPT | Performed by: FAMILY MEDICINE

## 2022-08-22 RX ORDER — METOPROLOL SUCCINATE 100 MG/1
100 TABLET, EXTENDED RELEASE ORAL DAILY
COMMUNITY
Start: 2022-08-22 | End: 2022-10-26

## 2022-08-22 ASSESSMENT — ENCOUNTER SYMPTOMS
HEADACHES: 0
NERVOUS/ANXIOUS: 1
CONSTIPATION: 0
HEMATOCHEZIA: 0
HEARTBURN: 0
SORE THROAT: 0
NAUSEA: 0
DIARRHEA: 1
JOINT SWELLING: 0
ABDOMINAL PAIN: 0
EYE PAIN: 0
ARTHRALGIAS: 0
COUGH: 1
SHORTNESS OF BREATH: 0
FEVER: 0
DIZZINESS: 0
WEAKNESS: 0
CHILLS: 0
HEMATURIA: 0
MYALGIAS: 1
FREQUENCY: 0
PALPITATIONS: 0
PARESTHESIAS: 0
DYSURIA: 0

## 2022-08-22 NOTE — PATIENT INSTRUCTIONS
Return to clinic for fasting labs.    Refills will be updated when results return.      Preventive Health Recommendations  Male Ages 50 - 64    Yearly exam:             See your health care provider every year in order to  o   Review health changes.   o   Discuss preventive care.    o   Review your medicines if your doctor has prescribed any.   Have a cholesterol test every 5 years, or more frequently if you are at risk for high cholesterol/heart disease.   Have a diabetes test (fasting glucose) every three years. If you are at risk for diabetes, you should have this test more often.   Have a colonoscopy at age 50, or have a yearly FIT test (stool test). These exams will check for colon cancer.    Talk with your health care provider about whether or not a prostate cancer screening test (PSA) is right for you.  You should be tested each year for STDs (sexually transmitted diseases), if you re at risk.     Shots: Get a flu shot each year. Get a tetanus shot every 10 years.     Nutrition:  Eat at least 5 servings of fruits and vegetables daily.   Eat whole-grain bread, whole-wheat pasta and brown rice instead of white grains and rice.   Get adequate Calcium and Vitamin D.     Lifestyle  Exercise for at least 150 minutes a week (30 minutes a day, 5 days a week). This will help you control your weight and prevent disease.   Limit alcohol to one drink per day.   No smoking.   Wear sunscreen to prevent skin cancer.   See your dentist every six months for an exam and cleaning.   See your eye doctor every 1 to 2 years.

## 2022-08-22 NOTE — PROGRESS NOTES
SUBJECTIVE:   CC: Gael Le is an 62 year old male who presents for preventative health visit.       Patient has been advised of split billing requirements and indicates understanding: Yes     Healthy Habits:     Getting at least 3 servings of Calcium per day:  Yes    Bi-annual eye exam:  Yes    Dental care twice a year:  Yes    Sleep apnea or symptoms of sleep apnea:  None    Diet:  Regular (no restrictions)    Frequency of exercise:  4-5 days/week    Duration of exercise:  45-60 minutes    Taking medications regularly:  Yes    Medication side effects:  None    PHQ-2 Total Score: 0    Additional concerns today:  Yes  walking - 20K steps           Hyperlipidemia Follow-Up      Are you regularly taking any medication or supplement to lower your cholesterol?   Yes- Lipitor    Are you having muscle aches or other side effects that you think could be caused by your cholesterol lowering medication?  No    Hypertension Follow-up  Cardiologist had increased his metoprolol to 100 mg back in May.  Blood pressure at that time was 160/78.  He has continued on the 50 mg dosage but plans to increase to 75 mg now.    Do you check your blood pressure regularly outside of the clinic? Yes     Are you following a low salt diet? Yes    Are your blood pressures ever more than 140 on the top number (systolic) OR more   than 90 on the bottom number (diastolic), for example 140/90? Yes    Anxiety Follow-Up    How are you doing with your anxiety since your last visit? No change    Are you having other symptoms that might be associated with anxiety? No    Have you had a significant life event? No     Are you feeling depressed? No    Do you have any concerns with your use of alcohol or other drugs? No    Social History     Tobacco Use     Smoking status: Never Smoker     Smokeless tobacco: Never Used   Vaping Use     Vaping Use: Never used   Substance Use Topics     Alcohol use: Yes     Comment: 3 drinks daily     Drug use: No      MIRIAN-7 SCORE 5/10/2021 5/26/2022 6/28/2022   Total Score - 16 (severe anxiety) 5 (mild anxiety)   Total Score 13 16 5     PHQ 5/10/2021 5/26/2022 6/28/2022   PHQ-9 Total Score 0 2 0   Q9: Thoughts of better off dead/self-harm past 2 weeks Not at all Not at all Not at all           Today's PHQ-2 Score:   PHQ-2 ( 1999 [employer]) 8/19/2022   Q1: Little interest or pleasure in doing things 0   Q2: Feeling down, depressed or hopeless 0   PHQ-2 Score 0   PHQ-2 Total Score (12-17 Years)- Positive if 3 or more points; Administer PHQ-A if positive -   Q1: Little interest or pleasure in doing things Not at all   Q2: Feeling down, depressed or hopeless Not at all   PHQ-2 Score 0       Abuse: Current or Past(Physical, Sexual or Emotional)- No  Do you feel safe in your environment? Yes        Social History     Tobacco Use     Smoking status: Never Smoker     Smokeless tobacco: Never Used   Substance Use Topics     Alcohol use: Yes     Comment: 3 drinks daily         Alcohol Use 8/19/2022   Prescreen: >3 drinks/day or >7 drinks/week? Yes   Prescreen: >3 drinks/day or >7 drinks/week? -   AUDIT SCORE  9       Last PSA:   PSA   Date Value Ref Range Status   10/15/2020 0.66 0 - 4 ug/L Final     Comment:     Assay Method:  Chemiluminescence using Siemens Vista analyzer       Reviewed orders with patient. Reviewed health maintenance and updated orders accordingly - Yes  BP Readings from Last 3 Encounters:   08/22/22 (!) 179/84   06/13/22 (!) 143/96   11/04/21 (!) 162/98    Wt Readings from Last 3 Encounters:   08/22/22 80.9 kg (178 lb 6.4 oz)   11/04/21 83.5 kg (184 lb)   05/10/21 80.4 kg (177 lb 4.8 oz)                    Reviewed and updated as needed this visit by clinical staff   Tobacco  Allergies  Meds   Med Hx  Surg Hx  Fam Hx  Soc Hx          Reviewed and updated as needed this visit by Provider   Tobacco  Allergies  Meds   Med Hx  Surg Hx  Fam Hx  Soc Hx         Past Medical History:   Diagnosis Date     Arthritis      mild in knees and feet     Hypertension      Nontoxic multinodular goiter      Paroxysmal atrial fibrillation (H) 11/04/2021     Supraventricular premature beats       Past Surgical History:   Procedure Laterality Date     ABDOMEN SURGERY  1968    Hernia     BIOPSY  1993    Bone Cyst - none cancerous     COLONOSCOPY  11/21/2016    4 small polyps, some pockets     COLONOSCOPY WITH CO2 INSUFFLATION N/A 11/21/2016    Procedure: COLONOSCOPY WITH CO2 INSUFFLATION;  Surgeon: Adeel Graf MD;  Location: [facility] OR     COLONOSCOPY WITH CO2 INSUFFLATION N/A 6/13/2022    Procedure: COLONOSCOPY, WITH CO2 INSUFFLATION;  Surgeon: Adeel Graf MD;  Location: [facility] OR     FINGER SURGERY       HERNIA REPAIR       ORTHOPEDIC SURGERY  1993    Bone cyst in Right Ring finger       Review of Systems   Constitutional: Negative for chills and fever.   HENT: Positive for hearing loss. Negative for congestion, ear pain and sore throat.    Eyes: Negative for pain and visual disturbance.   Respiratory: Positive for cough. Negative for shortness of breath.    Cardiovascular: Negative for chest pain, palpitations and peripheral edema.   Gastrointestinal: Positive for diarrhea. Negative for abdominal pain, constipation, heartburn, hematochezia and nausea.   Genitourinary: Positive for impotence and urgency. Negative for dysuria, frequency, genital sores, hematuria and penile discharge.   Musculoskeletal: Positive for myalgias. Negative for arthralgias and joint swelling.   Skin: Negative for rash.   Neurological: Negative for dizziness, weakness, headaches and paresthesias.   Psychiatric/Behavioral: Negative for mood changes. The patient is nervous/anxious.      Has hearing aids - will follow up for adjustment.  Cough - daily in AM - allergy evaluation - idiopathic rhinitis  - resolves with benadryl if occurring during the night.  Diarrhea - history - normal stool with diarrhea a few hours later.  Had colonoscopy and symptoms  "resolved for 1 - 1 1/2 months.    Low back pain - lifting related.  Using exercises from PHYSICAL THERAPY>    OBJECTIVE:   BP (!) 179/84 (BP Location: Right arm, Patient Position: Sitting, Cuff Size: Adult Regular)   Pulse 79   Temp 98.6  F (37  C) (Oral)   Ht 1.75 m (5' 8.9\")   Wt 80.9 kg (178 lb 6.4 oz)   SpO2 99%   BMI 26.42 kg/m      Physical Exam  GENERAL: alert, no distress and over weight  EYES: Eyes grossly normal to inspection, PERRL and conjunctivae and sclerae normal  HENT: ear canals and TM's normal, nose and mouth without ulcers or lesions  NECK: no adenopathy, no asymmetry, masses, or scars and thyroid normal to palpation  RESP: lungs clear to auscultation - no rales, rhonchi or wheezes  CV: regular rate and rhythm, normal S1 S2, no S3 or S4, no murmur, click or rub, no peripheral edema and peripheral pulses strong  ABDOMEN: soft, nontender, no hepatosplenomegaly, no masses and bowel sounds normal  MS: no gross musculoskeletal defects noted, no edema  SKIN: no suspicious lesions or rashes  NEURO: Normal strength and tone, mentation intact and speech normal  PSYCH: mentation appears normal, affect normal/bright    Diagnostic Test Results:  Labs reviewed in Epic      ASSESSMENT/PLAN:   (Z00.00) Routine general medical examination at a health care facility  (primary encounter diagnosis)  Comment: Will return to clinic for fasting labs  Plan: Screening and preventive care discussed    (E78.00) Hypercholesteremia  Comment: On Lipitor 20 mg daily  Plan: Lipid panel reflex to direct LDL Fasting        Return to clinic for lab and refill can be given after that is reviewed    (E04.2) Nontoxic multinodular goiter  Comment: Reassessment of thyroid function with his history of multinodular goiter planned.  Plan: TSH with free T4 reflex            (I10) Benign essential hypertension  Comment: Blood pressure remains elevated.  I have encouraged him to increase his metoprolol as recommended by his " "cardiologist.  Plan: Comprehensive metabolic panel (BMP + Alb, Alk         Phos, ALT, AST, Total. Bili, TP), CBC with         platelets, Albumin Random Urine Quantitative         with Creat Ratio        He plans to gradually increase the metoprolol until blood pressure control to a dose of 100 mg daily.    (Z13.1) Screening for diabetes mellitus  Comment: Return for labs  Plan: Comprehensive metabolic panel (BMP + Alb, Alk         Phos, ALT, AST, Total. Bili, TP)            (I48.0) Paroxysmal atrial fibrillation (H)  Comment: Reports no recent sustained episodes of A. fib.  Plan: Continue follow-up with cardiology every 6 to 12 months.    Patient has been advised of split billing requirements and indicates understanding: Yes    COUNSELING:   Reviewed preventive health counseling, as reflected in patient instructions       Regular exercise       Healthy diet/nutrition       Vision screening       Hearing screening       Immunizations    Declined: Pneumococcal due to Other we will consider in the future               Aspirin prophylaxis        Colorectal cancer screening       Prostate cancer screening -declined screening       Osteoporosis prevention/bone health       Advance Care Planning    Estimated body mass index is 26.42 kg/m  as calculated from the following:    Height as of this encounter: 1.75 m (5' 8.9\").    Weight as of this encounter: 80.9 kg (178 lb 6.4 oz).     Weight management plan: Discussed healthy diet and exercise guidelines    He reports that he has never smoked. He has never used smokeless tobacco.      Counseling Resources:  ATP IV Guidelines  Pooled Cohorts Equation Calculator  FRAX Risk Assessment  ICSI Preventive Guidelines  Dietary Guidelines for Americans, 2010  USDA's MyPlate  ASA Prophylaxis  Lung CA Screening    Idania Marroquin MD  [facility]      Patient Instructions   Return to clinic for fasting labs.    Refills will be updated when results return.  "

## 2022-09-04 ENCOUNTER — HEALTH MAINTENANCE LETTER (OUTPATIENT)
Age: 62
End: 2022-09-04

## 2022-09-10 DIAGNOSIS — F41.1 ANXIETY STATE: ICD-10-CM

## 2022-09-13 RX ORDER — ALPRAZOLAM 0.5 MG
TABLET ORAL
Qty: 30 TABLET | Refills: 0 | Status: SHIPPED | OUTPATIENT
Start: 2022-09-13 | End: 2022-10-26

## 2022-10-07 ENCOUNTER — LAB (OUTPATIENT)
Dept: LAB | Facility: CLINIC | Age: 62
End: 2022-10-07
Payer: COMMERCIAL

## 2022-10-07 DIAGNOSIS — E04.2 NONTOXIC MULTINODULAR GOITER: ICD-10-CM

## 2022-10-07 DIAGNOSIS — I10 BENIGN ESSENTIAL HYPERTENSION: ICD-10-CM

## 2022-10-07 DIAGNOSIS — E78.00 HYPERCHOLESTEREMIA: ICD-10-CM

## 2022-10-07 DIAGNOSIS — Z13.1 SCREENING FOR DIABETES MELLITUS: ICD-10-CM

## 2022-10-07 LAB
ALBUMIN SERPL-MCNC: 3.7 G/DL (ref 3.4–5)
ALP SERPL-CCNC: 73 U/L (ref 40–150)
ALT SERPL W P-5'-P-CCNC: 41 U/L (ref 0–70)
ANION GAP SERPL CALCULATED.3IONS-SCNC: 6 MMOL/L (ref 3–14)
AST SERPL W P-5'-P-CCNC: 32 U/L (ref 0–45)
BILIRUB SERPL-MCNC: 1.1 MG/DL (ref 0.2–1.3)
BUN SERPL-MCNC: 16 MG/DL (ref 7–30)
CALCIUM SERPL-MCNC: 9.1 MG/DL (ref 8.5–10.1)
CHLORIDE BLD-SCNC: 105 MMOL/L (ref 94–109)
CHOLEST SERPL-MCNC: 198 MG/DL
CO2 SERPL-SCNC: 28 MMOL/L (ref 20–32)
CREAT SERPL-MCNC: 0.8 MG/DL (ref 0.66–1.25)
CREAT UR-MCNC: 122 MG/DL
ERYTHROCYTE [DISTWIDTH] IN BLOOD BY AUTOMATED COUNT: 12.6 % (ref 10–15)
FASTING STATUS PATIENT QL REPORTED: NO
GFR SERPL CREATININE-BSD FRML MDRD: >90 ML/MIN/1.73M2
GLUCOSE BLD-MCNC: 103 MG/DL (ref 70–99)
HCT VFR BLD AUTO: 43.2 % (ref 40–53)
HDLC SERPL-MCNC: 58 MG/DL
HGB BLD-MCNC: 15.1 G/DL (ref 13.3–17.7)
LDLC SERPL CALC-MCNC: 85 MG/DL
MCH RBC QN AUTO: 33.3 PG (ref 26.5–33)
MCHC RBC AUTO-ENTMCNC: 35 G/DL (ref 31.5–36.5)
MCV RBC AUTO: 95 FL (ref 78–100)
MICROALBUMIN UR-MCNC: 30 MG/L
MICROALBUMIN/CREAT UR: 24.59 MG/G CR (ref 0–17)
NONHDLC SERPL-MCNC: 140 MG/DL
PLATELET # BLD AUTO: 177 10E3/UL (ref 150–450)
POTASSIUM BLD-SCNC: 3.8 MMOL/L (ref 3.4–5.3)
PROT SERPL-MCNC: 7.1 G/DL (ref 6.8–8.8)
RBC # BLD AUTO: 4.54 10E6/UL (ref 4.4–5.9)
SODIUM SERPL-SCNC: 139 MMOL/L (ref 133–144)
TRIGL SERPL-MCNC: 275 MG/DL
TSH SERPL DL<=0.005 MIU/L-ACNC: 3.5 MU/L (ref 0.4–4)
WBC # BLD AUTO: 7 10E3/UL (ref 4–11)

## 2022-10-07 PROCEDURE — 36415 COLL VENOUS BLD VENIPUNCTURE: CPT

## 2022-10-07 PROCEDURE — 82043 UR ALBUMIN QUANTITATIVE: CPT

## 2022-10-07 PROCEDURE — 80053 COMPREHEN METABOLIC PANEL: CPT

## 2022-10-07 PROCEDURE — 84443 ASSAY THYROID STIM HORMONE: CPT

## 2022-10-07 PROCEDURE — 85027 COMPLETE CBC AUTOMATED: CPT

## 2022-10-07 PROCEDURE — 80061 LIPID PANEL: CPT

## 2022-10-09 NOTE — RESULT ENCOUNTER NOTE
Your urine testing indicates elevated protein level.  This is often seen related to elevated blood pressures.  Use of losartan is designed to help protect the kidney function.  We will continue to monitor this testing every 6-12 months.  Improved BP control will hopefully protect the kidney function form decline.  Your thyroid testing is normal.  Your cholesterol testing indicates good control of LDL, bad cholesterol.  The triglyceride level is elevated but that is not unexpected if you were not fasting for these tests.  Your blood testing for liver testing and kidney function testing are normal.   Your blood sugar is borderline elevated but that is expected  if you were not fating for the appointment.  Your blood cell counts are normal.   Please call or MyChart message me if you have any questions.      PSK

## 2022-10-25 DIAGNOSIS — F41.1 ANXIETY STATE: ICD-10-CM

## 2022-10-26 RX ORDER — ALPRAZOLAM 0.5 MG
TABLET ORAL
Qty: 30 TABLET | Refills: 0 | Status: SHIPPED | OUTPATIENT
Start: 2022-10-26 | End: 2022-11-25

## 2022-10-26 RX ORDER — SOTALOL HYDROCHLORIDE 80 MG/1
80 TABLET ORAL
COMMUNITY
Start: 2022-09-08 | End: 2024-01-22 | Stop reason: SINTOL

## 2022-10-26 RX ORDER — METOPROLOL SUCCINATE 100 MG/1
50 TABLET, EXTENDED RELEASE ORAL DAILY
COMMUNITY
Start: 2022-10-26 | End: 2024-01-22

## 2022-11-21 DIAGNOSIS — I10 BENIGN ESSENTIAL HYPERTENSION: ICD-10-CM

## 2022-11-22 RX ORDER — AMLODIPINE BESYLATE 5 MG/1
TABLET ORAL
Qty: 90 TABLET | Refills: 0 | Status: SHIPPED | OUTPATIENT
Start: 2022-11-22 | End: 2023-02-19

## 2022-11-24 DIAGNOSIS — F41.1 ANXIETY STATE: ICD-10-CM

## 2022-11-25 RX ORDER — ALPRAZOLAM 0.5 MG
TABLET ORAL
Qty: 30 TABLET | Refills: 0 | Status: SHIPPED | OUTPATIENT
Start: 2022-11-25 | End: 2023-01-14

## 2022-12-26 ENCOUNTER — VIRTUAL VISIT (OUTPATIENT)
Dept: FAMILY MEDICINE | Facility: OTHER | Age: 62
End: 2022-12-26
Payer: COMMERCIAL

## 2022-12-26 DIAGNOSIS — U07.1 INFECTION DUE TO 2019 NOVEL CORONAVIRUS: Primary | ICD-10-CM

## 2022-12-26 PROCEDURE — 99213 OFFICE O/P EST LOW 20 MIN: CPT | Mod: CS | Performed by: PHYSICIAN ASSISTANT

## 2022-12-26 NOTE — PROGRESS NOTES
Gael is a 62 year old who is being evaluated via a billable video visit.      How would you like to obtain your AVS? MyChart  If the video visit is dropped, the invitation should be resent by: Text to cell phone: [phone]  Will anyone else be joining your video visit? No      Assessment & Plan     Infection due to 2019 novel coronavirus  Gael has high risk factors with his heart disease.  We elected to start him on Paxlovid.  He has no liver or kidney disease, he has drug interactions - he will reduce or avoid use of Alprazolam while on medication, he will hold atorvastatin until after finished Paxlovid, he can continue on full dose of amlodipine as his BP tends to run high and he will reduce dose or hold his tadalafil dose while on Paxlovid. We discussed potential side effects of medication including Paxlovid rebound, we discussed OTC management of his symptoms and symptoms which warrant further evaluation in clinic.  We discussed isolation recommendations and retesting as well.   - nirmatrelvir and ritonavir (PAXLOVID) therapy pack; Take 3 tablets by mouth 2 times daily for 5 days (Take 2 Nirmatrelvir tablets and 1 Ritonavir tablet twice daily for 5 days)    Return in about 5 days (around 12/31/2022) for If not improving, sooner if worse or new concerns.    Options for treatment and follow-up care were reviewed with the patient and/or guardian. Patient and/or guardian engaged in the decision making process and verbalized understanding of the options discussed and agreed with the final plan.    [facility]   Gael is a 62 year old, presenting for the following health issues:  Covid Concern      HPI       COVID-19 Symptom Review  How many days ago did these symptoms start? 3 days, tested positive 12/25    Are any of the following symptoms significant for you?    New or worsening difficulty breathing? No    Worsening cough? No    Fever or chills? Yes,  I felt feverish or had chills. Maybe around 100 yesterday morning but it resolved.     Headache: No    Sore throat: YES- last couple of days but better today    Chest pain: No    Diarrhea: YES    Body aches? YES    What treatments has patient tried? advil   Does patient live in a nursing home, group home, or shelter? No  Does patient have a way to get food/medications during quarantined? Yes, I have a friend or family member who can help me.     Wife had COVID around the 18th.   BP has been high but this happens when anxious.    Has not had any atrial fibrillation since Sotalol.     Review of Systems   Constitutional, HEENT, cardiovascular, pulmonary, gi and gu systems are negative, except as otherwise noted.      Objective    Vitals - Patient Reported  Systolic (Patient Reported): (!) 159  Diastolic (Patient Reported): (!) 95  SpO2 (Patient Reported): 97  Pulse (Patient Reported): 75  Pain Score: No Pain (0)      Vitals:  No vitals were obtained today due to virtual visit.    Physical Exam   GENERAL: Healthy, alert and no distress  EYES: Eyes grossly normal to inspection.  No discharge or erythema, or obvious scleral/conjunctival abnormalities.  RESP: No audible wheeze, cough, or visible cyanosis.  No visible retractions or increased work of breathing.    SKIN: Visible skin clear. No significant rash, abnormal pigmentation or lesions.  NEURO: Cranial nerves grossly intact.  Mentation and speech appropriate for age.  PSYCH: Mentation appears normal, affect normal/bright, judgement and insight intact, normal speech and appearance well-groomed.            Video-Visit Details    Type of service:  Video Visit     Originating Location (pt. Location): Home  Distant Location (provider location):  Off-site  Platform used for Video Visit: Net[employer]

## 2023-01-14 DIAGNOSIS — F41.1 ANXIETY STATE: ICD-10-CM

## 2023-01-14 RX ORDER — ALPRAZOLAM 0.5 MG
TABLET ORAL
Qty: 30 TABLET | Refills: 0 | Status: SHIPPED | OUTPATIENT
Start: 2023-01-14 | End: 2023-02-19

## 2023-02-09 DIAGNOSIS — E78.2 MIXED HYPERLIPIDEMIA: ICD-10-CM

## 2023-02-09 RX ORDER — ATORVASTATIN CALCIUM 20 MG/1
TABLET, FILM COATED ORAL
Qty: 90 TABLET | Refills: 1 | Status: SHIPPED | OUTPATIENT
Start: 2023-02-09 | End: 2023-08-21

## 2023-02-17 DIAGNOSIS — I10 BENIGN ESSENTIAL HYPERTENSION: ICD-10-CM

## 2023-02-17 DIAGNOSIS — F41.1 ANXIETY STATE: ICD-10-CM

## 2023-02-19 RX ORDER — ALPRAZOLAM 0.5 MG
0.5 TABLET ORAL DAILY PRN
Qty: 30 TABLET | Refills: 0 | Status: SHIPPED | OUTPATIENT
Start: 2023-02-19 | End: 2023-04-01

## 2023-02-19 RX ORDER — AMLODIPINE BESYLATE 5 MG/1
TABLET ORAL
Qty: 90 TABLET | Refills: 0 | Status: SHIPPED | OUTPATIENT
Start: 2023-02-19 | End: 2023-05-27

## 2023-03-28 DIAGNOSIS — F41.1 ANXIETY STATE: ICD-10-CM

## 2023-03-28 NOTE — TELEPHONE ENCOUNTER
Patient needs to be seen by Idania Marroquin MD - virtual ok (provider or resource)  Is there a time frame in which the patient should be seen Yes: within month  What does the patient need to be seen regarding anxiety, HTN   Does patient need to come fasting No  Phone: [phone] (home)  When workflow completed Route to provider if refill needed to get to follow up appointment.      Clinic: [facility] at [phone]    'Hi, this is (your name) I am calling from (provider's name) office. After reviewing your chart, (Provider's name) has indicated that you need an appointment to review (reason for visit). May I assist you in making this appointment? (Please contact us via Infrascalet or by phone at (Clinic name and Phone number) to schedule this appointment at your earliest convenience)'    Was first outreach attempted?No  If yes, the Second attempt due on:

## 2023-03-30 DIAGNOSIS — F41.1 ANXIETY STATE: ICD-10-CM

## 2023-03-30 NOTE — TELEPHONE ENCOUNTER
Medication Question or Refill        What medication are you calling about (include dose and sig)?:   ALPRAZolam (XANAX) 0.5 MG tablet  0.5 mg, DAILY PRN         Preferred Pharmacy:    [facility]/pharmacy #[id number] - [facility] [address] [facility] [place] [place] [facility] [address]  Phone: [phone] Fax: [phone]      Controlled Substance Agreement on file:   CSA -- Patient Level:    CSA: None found at the patient level.       Who prescribed the medication?: Idania Marroquin  Do you need a refill? Yes

## 2023-03-31 ENCOUNTER — MYC MEDICAL ADVICE (OUTPATIENT)
Dept: FAMILY MEDICINE | Facility: CLINIC | Age: 63
End: 2023-03-31
Payer: COMMERCIAL

## 2023-03-31 ENCOUNTER — VIRTUAL VISIT (OUTPATIENT)
Dept: FAMILY MEDICINE | Facility: CLINIC | Age: 63
End: 2023-03-31
Payer: COMMERCIAL

## 2023-03-31 ENCOUNTER — MYC REFILL (OUTPATIENT)
Dept: FAMILY MEDICINE | Facility: CLINIC | Age: 63
End: 2023-03-31

## 2023-03-31 DIAGNOSIS — F41.1 ANXIETY STATE: ICD-10-CM

## 2023-03-31 DIAGNOSIS — Z79.899 CHRONIC PRESCRIPTION BENZODIAZEPINE USE: Primary | ICD-10-CM

## 2023-03-31 PROBLEM — N52.9 ERECTILE DYSFUNCTION: Status: ACTIVE | Noted: 2017-10-24

## 2023-03-31 PROCEDURE — 99207 PR PRP NO CHARGE VIRTUAL VISIT WORK RVUS: CPT | Mod: 25 | Performed by: FAMILY MEDICINE

## 2023-03-31 RX ORDER — ALPRAZOLAM 0.5 MG
0.5 TABLET ORAL DAILY PRN
Qty: 30 TABLET | Refills: 0 | Status: CANCELLED | OUTPATIENT
Start: 2023-03-31

## 2023-03-31 ASSESSMENT — ANXIETY QUESTIONNAIRES
GAD7 TOTAL SCORE: 14
6. BECOMING EASILY ANNOYED OR IRRITABLE: NOT AT ALL
4. TROUBLE RELAXING: NEARLY EVERY DAY
8. IF YOU CHECKED OFF ANY PROBLEMS, HOW DIFFICULT HAVE THESE MADE IT FOR YOU TO DO YOUR WORK, TAKE CARE OF THINGS AT HOME, OR GET ALONG WITH OTHER PEOPLE?: NOT DIFFICULT AT ALL
GAD7 TOTAL SCORE: 14
2. NOT BEING ABLE TO STOP OR CONTROL WORRYING: NEARLY EVERY DAY
GAD7 TOTAL SCORE: 14
7. FEELING AFRAID AS IF SOMETHING AWFUL MIGHT HAPPEN: MORE THAN HALF THE DAYS
1. FEELING NERVOUS, ANXIOUS, OR ON EDGE: NEARLY EVERY DAY
IF YOU CHECKED OFF ANY PROBLEMS ON THIS QUESTIONNAIRE, HOW DIFFICULT HAVE THESE PROBLEMS MADE IT FOR YOU TO DO YOUR WORK, TAKE CARE OF THINGS AT HOME, OR GET ALONG WITH OTHER PEOPLE: NOT DIFFICULT AT ALL
5. BEING SO RESTLESS THAT IT IS HARD TO SIT STILL: NOT AT ALL
7. FEELING AFRAID AS IF SOMETHING AWFUL MIGHT HAPPEN: MORE THAN HALF THE DAYS
3. WORRYING TOO MUCH ABOUT DIFFERENT THINGS: NEARLY EVERY DAY

## 2023-03-31 NOTE — TELEPHONE ENCOUNTER
See refill encounter 3/28/2023. Patient due for f/u for any further refills. Patient instructed to schedule visit.    Pauline Hou RN  [facility]

## 2023-03-31 NOTE — PROGRESS NOTES
"THE PATIENT CHOSE ME BASED ON AVAILABILITY FOR A REFILL OF XANAX.  I EXPLAINED THAT ONE MUST STAY WITH THE SAME PROVIDER FOR THAT MEDICATION.  HE WILL CALL THE SCHEDULERS TO SEE WHEN HE CAN GET IN WITH HIS PCP.    He asked why. I explained it was abusable and addicting and so we require only one prescriber. He is getting near 64 yo and that will make this a \"never\" drug. He is not on an selective serotonin reuptake inhibitor or SNRI. I mentioned counseling and he did not imply that he has had any for anxiety.    NO CHARGE. (3:57)        Gael is a 62 year old who is being evaluated via a billable video visit.      How would you like to obtain your AVS? MyChart  If the video visit is dropped, the invitation should be resent by: Text to cell phone: [phone]  Will anyone else be joining your video visit? No            Subjective   Gael is a 62 year old, presenting for the following health issues:  Office Visit (Refill for xanax -needs to have virtual visit prior to refill. Hypertension.) and Recheck Medication    Additional Questions 3/31/2023   Roomed by nika   Accompanied by self     Patient Reported Additional Medications 3/31/2023   Patient reports taking the following new medications no     History of Present Illness       Mental Health Follow-up:  Patient presents to follow-up on Anxiety.    Patient's anxiety since last visit has been:  No change  The patient is not having other symptoms associated with anxiety.  Any significant life events: other  Patient is feeling anxious or having panic attacks.  Patient has concerns about alcohol or drug use.    Hypertension: He presents for follow up of hypertension.  He does not check blood pressure  regularly outside of the clinic. Outside blood pressures have been over 140/90. He does not follow a low salt diet.     He eats 0-1 servings of fruits and vegetables daily.He consumes 0 sweetened beverage(s) daily.He exercises with enough effort to increase his heart rate " 30 to 60 minutes per day.  He exercises with enough effort to increase his heart rate 5 days per week.   He is taking medications regularly.  Today's MIRIAN-7 Score: 14               Review of Systems         Objective           Vitals:  No vitals were obtained today due to virtual visit.    Physical Exam                   Video-Visit Details    Type of service:  Video Visit     Originating Location (pt. Location): Home  Location (provider location):  office  Platform used for Video Visit:      impairments found/rehab potential

## 2023-04-01 RX ORDER — ALPRAZOLAM 0.5 MG
0.5 TABLET ORAL DAILY PRN
Qty: 15 TABLET | Refills: 0 | Status: SHIPPED | OUTPATIENT
Start: 2023-04-01 | End: 2023-04-05

## 2023-04-04 ASSESSMENT — ANXIETY QUESTIONNAIRES: GAD7 TOTAL SCORE: 14

## 2023-04-04 NOTE — PROGRESS NOTES
"Gael is a 62 year old who is being evaluated via a billable video visit.      How would you like to obtain your AVS? MyChart  If the video visit is dropped, the invitation should be resent by: Text to cell phone: [phone]  Will anyone else be joining your video visit? No    Assessment & Plan     Anxiety state  Ongoing anxiety with specific triggers.  Counseling recommended to help establish techniques to defuse triggers when they occur.  Has not tolerated 2 different selective serotonin reuptake inhibitor medications.  Agreeable to trying alternative preventative medication in the form of buspar to take 5 mg twice a day.  Follow up after 4-6 weeks to assess response.  Refill xanax for max use once daily.    - ALPRAZolam (XANAX) 0.5 MG tablet; Take 1 tablet (0.5 mg) by mouth daily as needed for anxiety  - Adult Mental Health  Referral; Future  - busPIRone (BUSPAR) 5 MG tablet; Take 1 tablet (5 mg) by mouth 2 times daily    Paroxysmal atrial fibrillation (H)  No recurrent symptoms on Sotalol.  Ongoing care with cardiology.      Benign essential hypertension  Elevated BP - improves after taking xanax.  Feels anxiety is triggering BP.  Discussed importance of BP control to prevent issues in future related to high BP.  Blood pressure improves with better management of his anxiety symptoms, he may not need an adjustment in his medication for blood pressure.  If blood pressure remains high over the next few weeks, I would recommend an increase in either the losartan-hydrochlorothiazide or metoprolol.       Review of prior external note(s) from - [facility]Everywhere information from [facility] reviewed  Review of the result(s) of each unique test - EKG, TSH  Prescription drug management         BMI:   Estimated body mass index is 26.42 kg/m  as calculated from the following:    Height as of 8/22/22: 1.75 m (5' 8.9\").    Weight as of 8/22/22: 80.9 kg (178 lb 6.4 oz).   Weight management plan: Discussed healthy diet and " exercise guidelines    Patient Instructions   Referral given for counseling/therapy.  Someone will contact you to set up appointment.    Begin Buspar 5 mg twice a day.  If tolerated well, we can gradually increase the dose if symptoms are not controlled on this dosage.  Refill of the xanax sent for use as needed - our goal is decreased use with control of daily symptoms with the Buspar.      Monitor BP a few times a week over the next couple of weeks.  Send BP readings and if up on a consistent basis, adjustment of BP medication may be needed.  If with treatment of anxiety, BP is improved then no changes are needed.          Idania Marroquin MD  [facility] CANDACE Mayo is a 62 year old, presenting for the following health issues:  Anxiety and Hypertension    History of Present Illness       Mental Health Follow-up:  Patient presents to follow-up on Anxiety.    Patient's anxiety since last visit has been:  No change  The patient is not having other symptoms associated with anxiety.  Any significant life events: other  Patient is feeling anxious or having panic attacks.  Patient has concerns about alcohol or drug use.    Hypertension: He presents for follow up of hypertension.  He does not check blood pressure  regularly outside of the clinic. Outside blood pressures have been over 140/90. He does not follow a low salt diet.     He eats 0-1 servings of fruits and vegetables daily.He consumes 0 sweetened beverage(s) daily.He exercises with enough effort to increase his heart rate 30 to 60 minutes per day.  He exercises with enough effort to increase his heart rate 5 days per week.   He is taking medications regularly.  Today's MIRIAN-7 Score: 14     Atrial Fibrillation Follow-up      Symptoms: no recent chest pain, significant palpitations, dizziness/lightheadedness, dyspnea, or increased peripheral edema. and recent start Sotalol  No episode since then      Stroke prevention: None         6/13/2022     9:00 AM 6/13/2022     9:05 AM 6/13/2022     9:10 AM 6/13/2022     9:15 AM 8/22/2022     4:27 PM   Date   Pulse 92 92 97 102 79     Current [id number] Score: 1 point, which represents a 0.6% annual risk of major embolic event, without anti-coagulation or an LAAO device.     Hypertension Follow-up      Do you check your blood pressure regularly outside of the clinic? Yes     Are you following a low salt diet? No    Are your blood pressures ever more than 140 on the top number (systolic) OR more   than 90 on the bottom number (diastolic), for example 140/90? Yes    Work anxiety - daughter's health.  Can go from high anxiety to no anxiety.  In Dec  113/72, 118/83    Anxiety   Taking xanax in AM and calms down rumination - cutting alcohol consumption   Taking 75% of time.  No later in day use.  Did not tolerate the fluoxetine or escitalopram due to side effects - sweating.  Has done therapy in past for a particular phobia.      Review of Systems   Constitutional, HEENT, cardiovascular, pulmonary, gi and gu systems are negative, except as otherwise noted.      Objective         Vitals:  No vitals were obtained today due to virtual visit.    Physical Exam   GENERAL: Healthy, alert and no distress  EYES: Eyes grossly normal to inspection.  No discharge or erythema, or obvious scleral/conjunctival abnormalities.  RESP: No audible wheeze, cough, or visible cyanosis.  No visible retractions or increased work of breathing.    SKIN: Visible skin clear. No significant rash, abnormal pigmentation or lesions.  NEURO: Cranial nerves grossly intact.  Mentation and speech appropriate for age.  PSYCH: Mentation appears normal, affect normal/bright, judgement and insight intact, normal speech and appearance well-groomed.    Lab on 10/07/2022   Component Date Value Ref Range Status     Cholesterol 10/07/2022 198  <200 mg/dL Final     Triglycerides 10/07/2022 275 (H)  <150 mg/dL Final     Direct Measure HDL 10/07/2022 58   >=40 mg/dL Final     LDL Cholesterol Calculated 10/07/2022 85  <=100 mg/dL Final     Non HDL Cholesterol 10/07/2022 140 (H)  <130 mg/dL Final     Patient Fasting > 8hrs? 10/07/2022 No   Final     Sodium 10/07/2022 139  133 - 144 mmol/L Final     Potassium 10/07/2022 3.8  3.4 - 5.3 mmol/L Final     Chloride 10/07/2022 105  94 - 109 mmol/L Final     Carbon Dioxide (CO2) 10/07/2022 28  20 - 32 mmol/L Final     Anion Gap 10/07/2022 6  3 - 14 mmol/L Final     Urea Nitrogen 10/07/2022 16  7 - 30 mg/dL Final     Creatinine 10/07/2022 0.80  0.66 - 1.25 mg/dL Final     Calcium 10/07/2022 9.1  8.5 - 10.1 mg/dL Final     Glucose 10/07/2022 103 (H)  70 - 99 mg/dL Final     Alkaline Phosphatase 10/07/2022 73  40 - 150 U/L Final     AST 10/07/2022 32  0 - 45 U/L Final     ALT 10/07/2022 41  0 - 70 U/L Final     Protein Total 10/07/2022 7.1  6.8 - 8.8 g/dL Final     Albumin 10/07/2022 3.7  3.4 - 5.0 g/dL Final     Bilirubin Total 10/07/2022 1.1  0.2 - 1.3 mg/dL Final     GFR Estimate 10/07/2022 >90  >60 mL/min/1.73m2 Final    Effective December 21, 2021 eGFRcr in adults is calculated using the 2021 CKD-EPI creatinine equation which includes age and gender (Singh et al., NEJM, DOI: 10.1056/LOPKcf0304974)     WBC Count 10/07/2022 7.0  4.0 - 11.0 10e3/uL Final     RBC Count 10/07/2022 4.54  4.40 - 5.90 10e6/uL Final     Hemoglobin 10/07/2022 15.1  13.3 - 17.7 g/dL Final     Hematocrit 10/07/2022 43.2  40.0 - 53.0 % Final     MCV 10/07/2022 95  78 - 100 fL Final     MCH 10/07/2022 33.3 (H)  26.5 - 33.0 pg Final     MCHC 10/07/2022 35.0  31.5 - 36.5 g/dL Final     RDW 10/07/2022 12.6  10.0 - 15.0 % Final     Platelet Count 10/07/2022 177  150 - 450 10e3/uL Final     TSH 10/07/2022 3.50  0.40 - 4.00 mU/L Final     Creatinine Urine mg/dL 10/07/2022 122  mg/dL Final     Albumin Urine mg/L 10/07/2022 30  mg/L Final     Albumin Urine mg/g Cr 10/07/2022 24.59 (H)  0.00 - 17.00 mg/g Cr Final               Video-Visit Details    Type of  service:  Video Visit     Originating Location (pt. Location): Home  Distant Location (provider location):  Off-site  Platform used for Video Visit: Wilder

## 2023-04-05 ENCOUNTER — VIRTUAL VISIT (OUTPATIENT)
Dept: FAMILY MEDICINE | Facility: CLINIC | Age: 63
End: 2023-04-05
Payer: COMMERCIAL

## 2023-04-05 DIAGNOSIS — I48.0 PAROXYSMAL ATRIAL FIBRILLATION (H): ICD-10-CM

## 2023-04-05 DIAGNOSIS — F41.1 ANXIETY STATE: Primary | ICD-10-CM

## 2023-04-05 DIAGNOSIS — I10 BENIGN ESSENTIAL HYPERTENSION: ICD-10-CM

## 2023-04-05 PROCEDURE — 99214 OFFICE O/P EST MOD 30 MIN: CPT | Mod: VID | Performed by: FAMILY MEDICINE

## 2023-04-05 RX ORDER — BUSPIRONE HYDROCHLORIDE 5 MG/1
5 TABLET ORAL 2 TIMES DAILY
Qty: 60 TABLET | Refills: 0 | Status: SHIPPED | OUTPATIENT
Start: 2023-04-05 | End: 2023-05-08

## 2023-04-05 RX ORDER — ALPRAZOLAM 0.5 MG
0.5 TABLET ORAL DAILY PRN
Qty: 30 TABLET | Refills: 0 | Status: SHIPPED | OUTPATIENT
Start: 2023-04-05 | End: 2023-05-27

## 2023-04-05 NOTE — PATIENT INSTRUCTIONS
Referral given for counseling/therapy.  Someone will contact you to set up appointment.    Begin Buspar 5 mg twice a day.  If tolerated well, we can gradually increase the dose if symptoms are not controlled on this dosage.  Refill of the xanax sent for use as needed - our goal is decreased use with control of daily symptoms with the Buspar.      Monitor BP a few times a week over the next couple of weeks.  Send BP readings and if up on a consistent basis, adjustment of BP medication may be needed.  If with treatment of anxiety, BP is improved then no changes are needed.

## 2023-04-19 NOTE — TELEPHONE ENCOUNTER
Patient had virtual appt 4/5/23   In person scheduled for 8/17/23     Routing to provider to address pended refills so encounter can be signed/closed

## 2023-04-20 RX ORDER — ALPRAZOLAM 0.5 MG
0.5 TABLET ORAL DAILY PRN
Qty: 30 TABLET | Refills: 0 | OUTPATIENT
Start: 2023-04-20

## 2023-05-06 DIAGNOSIS — F41.1 ANXIETY STATE: ICD-10-CM

## 2023-05-08 RX ORDER — BUSPIRONE HYDROCHLORIDE 5 MG/1
TABLET ORAL
Qty: 60 TABLET | Refills: 0 | Status: SHIPPED | OUTPATIENT
Start: 2023-05-08 | End: 2023-05-22

## 2023-05-22 DIAGNOSIS — F41.1 ANXIETY STATE: ICD-10-CM

## 2023-05-22 RX ORDER — BUSPIRONE HYDROCHLORIDE 5 MG/1
TABLET ORAL
Qty: 180 TABLET | Refills: 0 | Status: SHIPPED | OUTPATIENT
Start: 2023-05-22 | End: 2023-08-03

## 2023-05-26 DIAGNOSIS — I10 BENIGN ESSENTIAL HYPERTENSION: ICD-10-CM

## 2023-05-26 DIAGNOSIS — F41.1 ANXIETY STATE: ICD-10-CM

## 2023-05-27 RX ORDER — AMLODIPINE BESYLATE 5 MG/1
TABLET ORAL
Qty: 90 TABLET | Refills: 0 | Status: SHIPPED | OUTPATIENT
Start: 2023-05-27 | End: 2023-08-22

## 2023-05-27 RX ORDER — ALPRAZOLAM 0.5 MG
TABLET ORAL
Qty: 30 TABLET | Refills: 0 | Status: SHIPPED | OUTPATIENT
Start: 2023-05-27 | End: 2023-08-17

## 2023-05-27 NOTE — TELEPHONE ENCOUNTER
Last refill xanax - 4/14/23-  On buspar now.  Refill for as needed use.  Follow up if frequent symptoms remain.    PSK

## 2023-08-01 ENCOUNTER — E-VISIT (OUTPATIENT)
Dept: FAMILY MEDICINE | Facility: CLINIC | Age: 63
End: 2023-08-01
Payer: COMMERCIAL

## 2023-08-01 DIAGNOSIS — I48.0 PAROXYSMAL ATRIAL FIBRILLATION (H): ICD-10-CM

## 2023-08-01 DIAGNOSIS — F41.1 ANXIETY STATE: ICD-10-CM

## 2023-08-01 DIAGNOSIS — I10 BENIGN ESSENTIAL HYPERTENSION: ICD-10-CM

## 2023-08-01 DIAGNOSIS — R53.83 FATIGUE, UNSPECIFIED TYPE: Primary | ICD-10-CM

## 2023-08-01 PROCEDURE — 99421 OL DIG E/M SVC 5-10 MIN: CPT | Performed by: FAMILY MEDICINE

## 2023-08-17 ENCOUNTER — PATIENT OUTREACH (OUTPATIENT)
Dept: CARE COORDINATION | Facility: CLINIC | Age: 63
End: 2023-08-17

## 2023-08-17 ENCOUNTER — OFFICE VISIT (OUTPATIENT)
Dept: FAMILY MEDICINE | Facility: CLINIC | Age: 63
End: 2023-08-17
Attending: FAMILY MEDICINE
Payer: COMMERCIAL

## 2023-08-17 VITALS
TEMPERATURE: 97.9 F | BODY MASS INDEX: 26.66 KG/M2 | HEIGHT: 69 IN | RESPIRATION RATE: 18 BRPM | WEIGHT: 180 LBS | HEART RATE: 74 BPM | SYSTOLIC BLOOD PRESSURE: 155 MMHG | OXYGEN SATURATION: 98 % | DIASTOLIC BLOOD PRESSURE: 90 MMHG

## 2023-08-17 DIAGNOSIS — F41.1 ANXIETY STATE: ICD-10-CM

## 2023-08-17 DIAGNOSIS — M25.552 HIP PAIN, LEFT: ICD-10-CM

## 2023-08-17 DIAGNOSIS — K21.9 GASTROESOPHAGEAL REFLUX DISEASE WITHOUT ESOPHAGITIS: ICD-10-CM

## 2023-08-17 DIAGNOSIS — Z12.5 SCREENING FOR PROSTATE CANCER: ICD-10-CM

## 2023-08-17 DIAGNOSIS — Z13.1 SCREENING FOR DIABETES MELLITUS: ICD-10-CM

## 2023-08-17 DIAGNOSIS — Z13.6 ENCOUNTER FOR ABDOMINAL AORTIC ANEURYSM (AAA) SCREENING: ICD-10-CM

## 2023-08-17 DIAGNOSIS — I48.0 PAROXYSMAL ATRIAL FIBRILLATION (H): ICD-10-CM

## 2023-08-17 DIAGNOSIS — I10 BENIGN ESSENTIAL HYPERTENSION: ICD-10-CM

## 2023-08-17 DIAGNOSIS — Z23 PNEUMOCOCCAL VACCINE ADMINISTERED: ICD-10-CM

## 2023-08-17 DIAGNOSIS — Z79.899 CHRONIC PRESCRIPTION BENZODIAZEPINE USE: ICD-10-CM

## 2023-08-17 DIAGNOSIS — E78.2 MIXED HYPERLIPIDEMIA: ICD-10-CM

## 2023-08-17 DIAGNOSIS — Z00.00 ROUTINE GENERAL MEDICAL EXAMINATION AT A HEALTH CARE FACILITY: Primary | ICD-10-CM

## 2023-08-17 PROCEDURE — 90471 IMMUNIZATION ADMIN: CPT | Performed by: FAMILY MEDICINE

## 2023-08-17 PROCEDURE — 99214 OFFICE O/P EST MOD 30 MIN: CPT | Mod: 25 | Performed by: FAMILY MEDICINE

## 2023-08-17 PROCEDURE — 90677 PCV20 VACCINE IM: CPT | Performed by: FAMILY MEDICINE

## 2023-08-17 PROCEDURE — 99397 PER PM REEVAL EST PAT 65+ YR: CPT | Mod: 25 | Performed by: FAMILY MEDICINE

## 2023-08-17 RX ORDER — ALPRAZOLAM 0.5 MG
0.5 TABLET ORAL DAILY PRN
Qty: 30 TABLET | Refills: 1 | Status: SHIPPED | OUTPATIENT
Start: 2023-08-17 | End: 2024-01-19

## 2023-08-17 ASSESSMENT — ENCOUNTER SYMPTOMS
ABDOMINAL PAIN: 0
COUGH: 1
HEMATOCHEZIA: 0
CHILLS: 0
SORE THROAT: 0
CONSTIPATION: 0
WEAKNESS: 0
DIARRHEA: 1
PARESTHESIAS: 0
HEADACHES: 0
SHORTNESS OF BREATH: 0
ARTHRALGIAS: 0
FEVER: 0
MYALGIAS: 1
HEARTBURN: 0
HEMATURIA: 0
EYE PAIN: 0
NERVOUS/ANXIOUS: 1
NAUSEA: 0
JOINT SWELLING: 0
PALPITATIONS: 0
DIZZINESS: 0
DYSURIA: 0
FREQUENCY: 0

## 2023-08-17 ASSESSMENT — PATIENT HEALTH QUESTIONNAIRE - PHQ9
SUM OF ALL RESPONSES TO PHQ QUESTIONS 1-9: 2
5. POOR APPETITE OR OVEREATING: NEARLY EVERY DAY

## 2023-08-17 ASSESSMENT — ANXIETY QUESTIONNAIRES
GAD7 TOTAL SCORE: 14
5. BEING SO RESTLESS THAT IT IS HARD TO SIT STILL: NOT AT ALL
6. BECOMING EASILY ANNOYED OR IRRITABLE: NOT AT ALL
7. FEELING AFRAID AS IF SOMETHING AWFUL MIGHT HAPPEN: NEARLY EVERY DAY
1. FEELING NERVOUS, ANXIOUS, OR ON EDGE: NEARLY EVERY DAY
GAD7 TOTAL SCORE: 14
2. NOT BEING ABLE TO STOP OR CONTROL WORRYING: MORE THAN HALF THE DAYS
3. WORRYING TOO MUCH ABOUT DIFFERENT THINGS: NEARLY EVERY DAY
IF YOU CHECKED OFF ANY PROBLEMS ON THIS QUESTIONNAIRE, HOW DIFFICULT HAVE THESE PROBLEMS MADE IT FOR YOU TO DO YOUR WORK, TAKE CARE OF THINGS AT HOME, OR GET ALONG WITH OTHER PEOPLE: NOT DIFFICULT AT ALL

## 2023-08-17 ASSESSMENT — ACTIVITIES OF DAILY LIVING (ADL): CURRENT_FUNCTION: NO ASSISTANCE NEEDED

## 2023-08-17 NOTE — NURSING NOTE
Prior to immunization administration, verified patients identity using patient s name and date of birth. Please see Immunization Activity for additional information.     Screening Questionnaire for Adult Immunization    Are you sick today?   No   Do you have allergies to medications, food, a vaccine component or latex?   No   Have you ever had a serious reaction after receiving a vaccination?   No   Do you have a long-term health problem with heart, lung, kidney, or metabolic disease (e.g., diabetes), asthma, a blood disorder, no spleen, complement component deficiency, a cochlear implant, or a spinal fluid leak?  Are you on long-term aspirin therapy?   No   Do you have cancer, leukemia, HIV/AIDS, or any other immune system problem?   No   Do you have a parent, brother, or sister with an immune system problem?   No   In the past 3 months, have you taken medications that affect  your immune system, such as prednisone, other steroids, or anticancer drugs; drugs for the treatment of rheumatoid arthritis, Crohn s disease, or psoriasis; or have you had radiation treatments?   No   Have you had a seizure, or a brain or other nervous system problem?   No   During the past year, have you received a transfusion of blood or blood    products, or been given immune (gamma) globulin or antiviral drug?   No   For women: Are you pregnant or is there a chance you could become       pregnant during the next month?   No   Have you received any vaccinations in the past 4 weeks?   No     Immunization questionnaire answers were all negative.      Patient instructed to remain in clinic for 15 minutes afterwards, and to report any adverse reactions.     Screening performed by Safia Arriaga MA on 8/17/2023 at 7:57 AM.

## 2023-08-17 NOTE — PROGRESS NOTES
"SUBJECTIVE:   CC: Gael is an 63 year old who presents for preventative health visit.       8/17/2023     7:02 AM   Additional Questions   Roomed by Liseth EWING   Accompanied by Self         8/17/2023     7:02 AM   Patient Reported Additional Medications   Patient reports taking the following new medications None       Healthy Habits:     In general, how would you rate your overall health?  Good    Frequency of exercise:  2-3 days/week    Duration of exercise:  45-60 minutes    Do you usually eat at least 4 servings of fruit and vegetables a day, include whole grains    & fiber and avoid regularly eating high fat or \"junk\" foods?  No    Taking medications regularly:  Yes    Medication side effects:  Other    Ability to successfully perform activities of daily living:  No assistance needed    Home Safety:  Lack of grab bars in the bathroom    Hearing Impairment:  Difficulty following a conversation in a noisy restaurant or crowded room, need to ask people to speak up or repeat themselves and difficulty understanding soft or whispered speech    In the past 6 months, have you been bothered by leaking of urine?  No    In general, how would you rate your overall mental or emotional health?  Good    Additional concerns today:  Yes                Short episode last weekend - A fib - resolved after a glass of wine.  Has resolved previously with this.  Stress is a trigger for him  Has appointment with cardiology on 9/11.  Fatigue - metoprolol switched to pm and helped this.    Hip pain - left - worse when standing - intermittent symptoms - no correlation.  Does speed walking up hill.  Some radiation down leg.  No numbness.    Hyperlipidemia Follow-Up    Are you regularly taking any medication or supplement to lower your cholesterol?   Yes- lipitor  Are you having muscle aches or other side effects that you think could be caused by your cholesterol lowering medication?  No    Hypertension Follow-up    Do you check your blood " pressure regularly outside of the clinic? Yes   Are you following a low salt diet? Yes  Are your blood pressures ever more than 140 on the top number (systolic) OR more   than 90 on the bottom number (diastolic), for example 140/90? No    Atrial Fibrillation Follow-up    Symptoms: no recent chest pain, significant palpitations, dizziness/lightheadedness, dyspnea, or increased peripheral edema. and recent episode of atrial fib treated in ED 7/22/23.  Follow up with cardiology upcoming   Stroke prevention: None        6/13/2022     9:10 AM 6/13/2022     9:15 AM 8/22/2022     4:27 PM 8/17/2023     7:05 AM 8/17/2023     7:09 AM   Date   Pulse 97 102 79 63 74     Current [id number] Score: 1 point, which represents a 0.6% annual risk of major embolic event, without anti-coagulation or an LAAO device.       Anxiety Follow-Up  How are you doing with your anxiety since your last visit? No change  Are you having other symptoms that might be associated with anxiety? No  Have you had a significant life event? Health Concerns   Are you feeling depressed? No  Do you have any concerns with your use of alcohol or other drugs? No  Regularly consumes alcohol for anxiety self medicating.    Social History     Tobacco Use    Smoking status: Never     Passive exposure: Never    Smokeless tobacco: Never   Vaping Use    Vaping Use: Never used   Substance Use Topics    Alcohol use: Yes     Comment: 3 drinks daily    Drug use: No         6/28/2022     2:16 PM 3/31/2023    12:41 PM 8/17/2023     8:10 AM   MIRIAN-7 SCORE   Total Score 5 (mild anxiety) 14 (moderate anxiety)    Total Score 5 14 14         5/26/2022     3:32 PM 6/28/2022     2:16 PM 8/17/2023     8:10 AM   PHQ   PHQ-9 Total Score 2 0 2   Q9: Thoughts of better off dead/self-harm past 2 weeks Not at all Not at all Not at all       Have you ever done Advance Care Planning? (For example, a Health Directive, POLST, or a discussion with a medical provider or your loved ones about your  wishes): No, advance care planning information given to patient to review.  Patient declined advance care planning discussion at this time.    Social History     Tobacco Use    Smoking status: Never     Passive exposure: Never    Smokeless tobacco: Never   Substance Use Topics    Alcohol use: Yes     Comment: 3 drinks daily             8/17/2023     7:04 AM   Alcohol Use   Prescreen: >3 drinks/day or >7 drinks/week? Yes   AUDIT SCORE  12   Cutting down on alcohol consumption.    3-4/night.  Previously 4-7  Last PSA:   PSA   Date Value Ref Range Status   10/15/2020 0.66 0 - 4 ug/L Final     Comment:     Assay Method:  Chemiluminescence using Siemens Vista analyzer       Reviewed orders with patient. Reviewed health maintenance and updated orders accordingly - Yes  BP Readings from Last 3 Encounters:   08/17/23 (!) 155/90   08/22/22 (!) 164/82   06/13/22 (!) 143/96    Wt Readings from Last 3 Encounters:   08/17/23 81.6 kg (180 lb)   08/22/22 80.9 kg (178 lb 6.4 oz)   11/04/21 83.5 kg (184 lb)                    Reviewed and updated as needed this visit by clinical staff   Tobacco  Allergies  Meds   Med Hx  Surg Hx  Fam Hx          Reviewed and updated as needed this visit by Provider   Tobacco  Allergies  Meds   Med Hx  Surg Hx  Fam Hx         Past Medical History:   Diagnosis Date    Arthritis     mild in knees and feet    Hypertension     Nontoxic multinodular goiter     Paroxysmal atrial fibrillation (H) 11/04/2021    Supraventricular premature beats       Past Surgical History:   Procedure Laterality Date    ABDOMEN SURGERY  1968    Hernia    BIOPSY  1993    Bone Cyst - none cancerous    COLONOSCOPY  11/21/2016    4 small polyps, some pockets    COLONOSCOPY WITH CO2 INSUFFLATION N/A 11/21/2016    Procedure: COLONOSCOPY WITH CO2 INSUFFLATION;  Surgeon: Adeel Graf MD;  Location: [facility] OR    COLONOSCOPY WITH CO2 INSUFFLATION N/A 6/13/2022    Procedure: COLONOSCOPY, WITH CO2 INSUFFLATION;   "Surgeon: Adeel Graf MD;  Location: [facility] OR    FINGER SURGERY      HERNIA REPAIR      ORTHOPEDIC SURGERY  1993    Bone cyst in Right Ring finger       Review of Systems   Constitutional:  Negative for chills and fever.   HENT:  Positive for congestion and hearing loss. Negative for ear pain and sore throat.    Eyes:  Negative for pain and visual disturbance.   Respiratory:  Positive for cough. Negative for shortness of breath.    Cardiovascular:  Negative for chest pain, palpitations and peripheral edema.   Gastrointestinal:  Positive for diarrhea. Negative for abdominal pain, constipation, heartburn, hematochezia and nausea.   Genitourinary:  Positive for impotence. Negative for dysuria, frequency, genital sores, hematuria, penile discharge and urgency.   Musculoskeletal:  Positive for myalgias. Negative for arthralgias and joint swelling.   Skin:  Negative for rash.   Neurological:  Negative for dizziness, weakness, headaches and paresthesias.   Psychiatric/Behavioral:  Negative for mood changes. The patient is nervous/anxious.      Congestion/cough - postnasal drip - seasonal variation.  Resolves with benadryl.    Diarrhea - since taking prilosec.  AM symptoms.  Not troubling.    Treatment with  Cialis for ED through urology clinic.        OBJECTIVE:   BP (!) 155/90 (BP Location: Right arm, Patient Position: Sitting, Cuff Size: Adult Large)   Pulse 74   Temp 97.9  F (36.6  C) (Oral)   Resp 18   Ht 1.746 m (5' 8.75\")   Wt 81.6 kg (180 lb)   SpO2 98%   BMI 26.78 kg/m      Physical Exam  GENERAL: healthy, alert and no distress  EYES: Eyes grossly normal to inspection, PERRL and conjunctivae and sclerae normal  HENT: ear canals and TM's normal, nose and mouth without ulcers or lesions  NECK: no adenopathy, no asymmetry, masses, or scars and thyroid normal to palpation  RESP: lungs clear to auscultation - no rales, rhonchi or wheezes  CV: regular rate and rhythm, normal S1 S2, no S3 or S4, no murmur, " click or rub, no peripheral edema and peripheral pulses strong  ABDOMEN: soft, nontender, no hepatosplenomegaly, no masses and bowel sounds normal  MS: tenderness to palpation posterior left hip.  FROM without pain hips bilaterally.    SKIN: no suspicious lesions or rashes  NEURO: Normal strength and tone, mentation intact and speech normal  PSYCH: mentation appears normal, affect normal/bright, and anxious    Diagnostic Test Results:  Labs reviewed in [facility]  CBC W PLT NO DIFF  Order: [id number]   Ref Range & Units 3 wk ago   WHITE BLOOD COUNT         4.5 - 11.0 thou/cu mm 6.4   RED BLOOD COUNT           4.30 - 5.90 mil/cu mm 4.50   HEMOGLOBIN               13.5 - 17.5 g/dL 15.4   HEMATOCRIT               37.0 - 53.0 % 43.3   MCV                       80 - 100 fL 96   MCH                       26.0 - 34.0 pg 34.2 High    MCHC                     32.0 - 36.0 g/dL 35.6   RDW                       11.5 - 15.5 % 13.0   PLATELET COUNT           140 - 440 thou/cu mm 182   MPV                       6.5 - 11.0 fL 10.2   Resulting Agency  formerly [facility]     ntains abnormal data BASIC METABOLIC PANEL  Order: [id number]   Ref Range & Units 3 wk ago   SODIUM 136 - 145 mmol/L 139   POTASSIUM 3.5 - 5.1 mmol/L 4.0   CHLORIDE 98 - 107 mmol/L 102   CO2,TOTAL 22 - 29 mmol/L 24   ANION GAP 5 - 18 13   GLUCOSE 70 - 99 mg/dL 129 High    CALCIUM 8.8 - 10.2 mg/dL 9.2   BUN 8 - 23 mg/dL 15   CREATININE 0.70 - 1.20 mg/dL 0.78   BUN/CREAT RATIO 10 - 20 19   eGFR >90 mL/min/1.73m2 >90   TSH  Order: [id number]   Ref Range & Units 3 wk ago   TSH 0.27 - 4.20 uIU/mL 2.10   Resulting Agency  formerly [facility]     ASSESSMENT/PLAN:   (Z00.00) Routine general medical examination at a health care facility  (primary encounter diagnosis)  Comment: nonfasting  Plan: screening and preventative care discussed.  Will return to clinic for fasting labs.  Reviewed prior lab results from ED as noted above.      (F41.1) Anxiety state  Comment: was on buspar previously -  stopped due to concerns about side effects.  Still having symptoms that he attributed to medication so willing to retry Buspar again now.   (Z79.899) Chronic prescription benzodiazepine use  Plan: ALPRAZolam (XANAX) 0.5 MG tablet        Resume buspar and monitor symptoms.      (I48.0) Paroxysmal atrial fibrillation (H)  Comment: recent episode 7/22.  No current symptoms.  Plan: ongoing care with cardiology - taking sotalol and metoprolol as previous.      (I10) Benign essential hypertension  Comment: elevated BP here today.  Monitoring at home normal.  Has follow up with cardiology in coming weeks.  Plan: Comprehensive metabolic panel (BMP + Alb, Alk         Phos, ALT, AST, Total. Bili, TP)        Continue amlodipine, metoprolol, losartan-hydrochlorothiazide as previous.     (K21.9) Gastroesophageal reflux disease without esophagitis  Comment: symptoms controlled.   Plan: continue omeprazole.    Left hip pain   Stretching exercises recommended.  Does not appear to be related to the joint on exam.  PHYSICAL THERAPY referral can be given if desired.      (E78.2) Mixed hyperlipidemia  Comment: return to clinic for fasting labs.  Plan: Lipid panel reflex to direct LDL Fasting        Refill lipitor when results return.     (Z23) Pneumococcal vaccine administered  Comment:   Plan: Pneumococcal 20 Valent Conjugate (PCV20)            (Z12.5) Screening for prostate cancer  Comment:   Plan: PSA, screen            (Z13.1) Screening for diabetes mellitus  Comment: return to clinic for labs]  Plan: Comprehensive metabolic panel (BMP + Alb, Alk         Phos, ALT, AST, Total. Bili, TP)            (Z13.6) Encounter for abdominal aortic aneurysm (AAA) screening  Comment: former smoker, HTN  Plan: US Abdominal Aorta Imaging        Screening recommended.    Patient has been advised of split billing requirements and indicates understanding: Yes      COUNSELING:   Reviewed preventive health counseling, as reflected in patient  instructions       Consider AAA screening for ages 65-75 and > 100 cig smoking history or family history of AAA       Regular exercise       Healthy diet/nutrition       Vision screening       Hearing screening       Pneumococcal Vaccine          Alcohol Use        Prostate cancer screening       Osteoporosis prevention/bone health        He reports that he has never smoked. He has never been exposed to tobacco smoke. He has never used smokeless tobacco.            Idania Marroquin MD  [facility]  The patient reports that he drinks more than one alcoholic drink per day and sometimes engages in binge or excessive drinking. He was counseled and given information about possible harmful effects of excessive alcohol intake as well as where to get help for alcohol problems.  The patient was counseled and encouraged to consider modifying their diet and eating habits. He was provided with information on recommended healthy diet options.  The patient was provided with written information regarding signs of hearing loss.            Patient Instructions   Return to clinic for fasting labs.   I will update scripts when results return.    Monitor BP at home.  If >140/90, follow up is recommended.      Ultrasound of the aorta recommended to screen for aneurysm.   You can call [phone] to schedule this at the [facility] building in [place]     Pneumonia vaccine.    Refill xanax now.    Stretching exercises for the left hip.  Follow up if persistent symptoms and referral to PHYSICAL THERAPY can be given.

## 2023-08-17 NOTE — PATIENT INSTRUCTIONS
Return to clinic for fasting labs.   I will update scripts when results return.    Monitor BP at home.  If >140/90, follow up is recommended.      Ultrasound of the aorta recommended to screen for aneurysm.   You can call [phone] to schedule this at the [facility] building in [place]     Pneumonia vaccine.    Refill xanax now.    Stretching exercises for the left hip.  Follow up if persistent symptoms and referral to PHYSICAL THERAPY can be given.          Patient Education   Personalized Prevention Plan  You are due for the preventive services outlined below.  Your care team is available to assist you in scheduling these services.  If you have already completed any of these items, please share that information with your care team to update in your medical record.  Health Maintenance Due   Topic Date Due    Pneumococcal Vaccine (1 - PCV) Never done    AORTIC ANEURYSM SCREENING (SYSTEM ASSIGNED)  Never done    COVID-19 Vaccine (6 - Pfizer series) 08/13/2023    Annual Wellness Visit  08/22/2023      Patient Education   Alcohol Use     Many people can enjoy a glass of wine or beer without any negative consequences to their health. According to the Centers for Disease Control and Prevention (CDC), having one or fewer drinks per day for women and two or fewer per day for men is considered moderate drinking.     When people drink more than moderately, it can become concerning. Excessive drinking is defined as consuming 15 drinks or more per week for men and 8 drinks or more per week for women. There are various health problems associated with excessive drinking, which include:  Damage to vital organs like the heart, brain, liver and pancreas  Harm to the digestive tract  Weaken the immune system  Higher risk for heart disease and cancer    There are many resources available to people who are addicted to alcohol. A counselor or other health care provider can give you support. So can a , , or rabbi who  is trained in substance abuse counseling. Friends and family may also help once you are connected with professionals.           Learning About Dietary Guidelines  What are the Dietary Guidelines for Americans?     Dietary Guidelines for Americans provide tips for eating well and staying healthy. This helps reduce the risk for long-term (chronic) diseases.  These guidelines recommend that you:  Eat and drink the right amount for you. The U.[place]. government's food guide is called MyPlate. It can help you make your own well-balanced eating plan.  Try to balance your eating with your activity. This helps you stay at a healthy weight.  Drink alcohol in moderation, if at all.  Limit foods high in salt, saturated fat, trans fat, and added sugar.  These guidelines are from the U.S. Department of Agriculture and the U.S. Department of Health and Human Services. They are updated every 5 years.  What is MyPlate?  MyPlate is the U.S. government's food guide. It can help you make your own well-balanced eating plan. A balanced eating plan means that you eat enough, but not too much, and that your food gives you the nutrients you need to stay healthy.  MyPlate focuses on eating plenty of whole grains, fruits, and vegetables, and on limiting fat and sugar. It is available online at www.ChooseMyPlate.gov.  How can you get started?  If you're trying to eat healthier, you can slowly change your eating habits over time. You don't have to make big changes all at once. Start by adding one or two healthy foods to your meals each day.  Grains  Choose whole-grain breads and cereals and whole-wheat pasta and whole-grain crackers.  Vegetables  Eat a variety of vegetables every day. They have lots of nutrients and are part of a heart-healthy diet.  Fruits  Eat a variety of fruits every day. Fruits contain lots of nutrients. Choose fresh fruit instead of fruit juice.  Protein foods  Choose fish and lean poultry more often. Eat red meat and fried  "meats less often. Dried beans, tofu, and nuts are also good sources of protein.  Dairy  Choose low-fat or fat-free products from this food group. If you have problems digesting milk, try eating cheese or yogurt instead.  Fats and oils  Limit fats and oils if you're trying to cut calories. Choose healthy fats when you cook. These include canola oil and olive oil.  Where can you learn more?  Go to https://www.[employer].net/patiented  Enter D676 in the search box to learn more about \"Learning About Dietary Guidelines.\"  Current as of: March 1, 2023               Content Version: 13.7    [id number] [employer].   Care instructions adapted under license by your healthcare professional. If you have questions about a medical condition or this instruction, always ask your healthcare professional. [employer] disclaims any warranty or liability for your use of this information.      Hearing Loss: Care Instructions  Overview     Hearing loss is a sudden or slow decrease in how well you hear. It can range from slight to profound. Permanent hearing loss can occur with aging. It also can happen when you are exposed long-term to loud noise. Examples include listening to loud music, riding motorcycles, or being around other loud machines.  Hearing loss can affect your work and home life. It can make you feel lonely or depressed. You may feel that you have lost your independence. But hearing aids and other devices can help you hear better and feel connected to others.  Follow-up care is a key part of your treatment and safety. Be sure to make and go to all appointments, and call your doctor if you are having problems. It's also a good idea to know your test results and keep a list of the medicines you take.  How can you care for yourself at home?  Avoid loud noises whenever possible. This helps keep your hearing from getting worse.  Always wear hearing protection around loud noises.  Wear a hearing aid as " "directed.  A professional can help you pick a hearing aid that will work best for you.  You can also get hearing aids over the counter for mild to moderate hearing loss.  Have hearing tests as your doctor suggests. They can show whether your hearing has changed. Your hearing aid may need to be adjusted.  Use other devices as needed. These may include:  Telephone amplifiers and hearing aids that can connect to a television, stereo, radio, or microphone.  Devices that use lights or vibrations. These alert you to the doorbell, a ringing telephone, or a baby monitor.  Television closed-captioning. This shows the words at the bottom of the screen. Most new TVs can do this.  TTY (text telephone). This lets you type messages back and forth on the telephone instead of talking or listening. These devices are also called TDD. When messages are typed on the keyboard, they are sent over the phone line to a receiving TTY. The message is shown on a monitor.  Use text messaging, social media, and email if it is hard for you to communicate by telephone.  Try to learn a listening technique called speechreading. It is not lipreading. You pay attention to people's gestures, expressions, posture, and tone of voice. These clues can help you understand what a person is saying. Face the person you are talking to, and have them face you. Make sure the lighting is good. You need to see the other person's face clearly.  Think about counseling if you need help to adjust to your hearing loss.  When should you call for help?  Watch closely for changes in your health, and be sure to contact your doctor if:    You think your hearing is getting worse.     You have new symptoms, such as dizziness or nausea.   Where can you learn more?  Go to https://www.healthwise.net/patiented  Enter R798 in the search box to learn more about \"Hearing Loss: Care Instructions.\"  Current as of: March 1, 2023               Content Version: 13.7    [id number] " [employer].   Care instructions adapted under license by your healthcare professional. If you have questions about a medical condition or this instruction, always ask your healthcare professional. [employer] disclaims any warranty or liability for your use of this information.

## 2023-08-19 DIAGNOSIS — E78.2 MIXED HYPERLIPIDEMIA: ICD-10-CM

## 2023-08-20 RX ORDER — BUSPIRONE HYDROCHLORIDE 5 MG/1
5 TABLET ORAL 2 TIMES DAILY
Qty: 180 TABLET | Refills: 0 | COMMUNITY
Start: 2023-08-20 | End: 2024-01-22 | Stop reason: SINTOL

## 2023-08-21 RX ORDER — ATORVASTATIN CALCIUM 20 MG/1
TABLET, FILM COATED ORAL
Qty: 90 TABLET | Refills: 0 | Status: SHIPPED | OUTPATIENT
Start: 2023-08-21 | End: 2023-11-20

## 2023-08-22 DIAGNOSIS — I10 BENIGN ESSENTIAL HYPERTENSION: ICD-10-CM

## 2023-08-22 RX ORDER — AMLODIPINE BESYLATE 5 MG/1
TABLET ORAL
Qty: 90 TABLET | Refills: 1 | Status: SHIPPED | OUTPATIENT
Start: 2023-08-22 | End: 2024-02-13

## 2023-08-31 ENCOUNTER — PATIENT OUTREACH (OUTPATIENT)
Dept: CARE COORDINATION | Facility: CLINIC | Age: 63
End: 2023-08-31
Payer: MEDICARE

## 2023-09-11 ENCOUNTER — MYC MEDICAL ADVICE (OUTPATIENT)
Dept: FAMILY MEDICINE | Facility: CLINIC | Age: 63
End: 2023-09-11
Payer: MEDICARE

## 2023-09-11 DIAGNOSIS — M25.552 HIP PAIN, LEFT: Primary | ICD-10-CM

## 2023-09-11 NOTE — TELEPHONE ENCOUNTER
Routing to provider to review and advise.    Please see [email or web address] message regarding PT referral.       Kristina Kjellberg, MSN, RN  [facility] Primary Care Triage

## 2023-09-12 ASSESSMENT — ACTIVITIES OF DAILY LIVING (ADL)
HEAVY_WORK: SLIGHT DIFFICULTY
HOS_ADL_HIGHEST_POTENTIAL_SCORE: 64
WALKING_15_MINUTES_OR_GREATER: MODERATE DIFFICULTY
STANDING FOR 15 MINUTES: MODERATE DIFFICULTY
GETTING INTO AND OUT OF AN AVERAGE CAR: NO DIFFICULTY AT ALL
HOS_ADL_SCORE(%): 68.75
RECREATIONAL ACTIVITIES: MODERATE DIFFICULTY
WALKING_INITIALLY: MODERATE DIFFICULTY
TWISTING/PIVOTING ON INVOLVED LEG: SLIGHT DIFFICULTY
DEEP SQUATTING: NO DIFFICULTY AT ALL
WALKING_UP_STEEP_HILLS: MODERATE DIFFICULTY
GOING UP 1 FLIGHT OF STAIRS: SLIGHT DIFFICULTY
GOING DOWN 1 FLIGHT OF STAIRS: SLIGHT DIFFICULTY
HOS_ADL_ITEM_SCORE_TOTAL: 44
WALKING_DOWN_STEEP_HILLS: MODERATE DIFFICULTY
HOW_WOULD_YOU_RATE_YOUR_CURRENT_LEVEL_OF_FUNCTION_DURING_YOUR_USUAL_ACTIVITIES_OF_DAILY_LIVING_FROM_0_TO_100_WITH_100_BEING_YOUR_LEVEL_OF_FUNCTION_PRIOR_TO_YOUR_HIP_PROBLEM_AND_0_BEING_THE_INABILITY_TO_PERFORM_ANY_OF_YOUR_USUAL_DAILY_ACTIVITIES?: 60
LIGHT_TO_MODERATE_WORK: SLIGHT DIFFICULTY
ROLLING OVER IN BED: SLIGHT DIFFICULTY
WALKING_APPROXIMATELY_10_MINUTES: MODERATE DIFFICULTY
PUTTING ON SOCKS AND SHOES: NO DIFFICULTY AT ALL
GETTING_INTO_AND_OUT_OF_A_BATHTUB: NO DIFFICULTY AT ALL
SITTING FOR 15 MINUTES: NO DIFFICULTY AT ALL

## 2023-09-13 ENCOUNTER — THERAPY VISIT (OUTPATIENT)
Dept: PHYSICAL THERAPY | Facility: CLINIC | Age: 63
End: 2023-09-13
Attending: FAMILY MEDICINE
Payer: COMMERCIAL

## 2023-09-13 DIAGNOSIS — M25.552 HIP PAIN, LEFT: ICD-10-CM

## 2023-09-13 PROCEDURE — 97161 PT EVAL LOW COMPLEX 20 MIN: CPT | Mod: GP | Performed by: PHYSICAL THERAPIST

## 2023-09-13 PROCEDURE — 97110 THERAPEUTIC EXERCISES: CPT | Mod: GP | Performed by: PHYSICAL THERAPIST

## 2023-09-13 NOTE — PROGRESS NOTES
PHYSICAL THERAPY EVALUATION  Type of Visit: Evaluation    See electronic medical record for Abuse and Falls Screening details.    Subjective       Presenting condition or subjective complaint:    Date of onset:      Relevant medical history: Arthritis; Hearing problems; Heart problems; High blood pressure; Substance use disorder   Dates & types of surgery: 1990 s. Bone cyst removal, 1960 s hernia repair    Prior diagnostic imaging/testing results:       Prior therapy history for the same diagnosis, illness or injury: No          Living Environment  Social support: With a significant other or spouse   Type of home: Multi-level   Stairs to enter the home: Yes 2 Is there a railing: No   Ramp: No   Stairs inside the home: Yes 26 Is there a railing: Yes   Help at home: Home and Yard maintenance tasks  Equipment owned:       Employment: Yes Project managrr  Hobbies/Interests: Reading , travel, theology    Patient goals for therapy:  to be able to walk 2 miles a day and stand without being distracted by the pain.     Pain assessment: See objective evaluation for additional pain details     Objective   HIP EVALUATION  PAIN: Pain Level at Rest: 2/10  Pain Level with Use: 5/10  Pain Location: L low back, glute, hip and posteriolateral L thigh  Pain Quality: Sharp and Shooting  Pain Frequency: intermittent  Pain is Worst: not sure feels like it might be more activity based  Pain is Exacerbated By: walking, standing, transfers from sit to stand, rolling in bed  Pain is Relieved By: sitting, hot tub  Pain Progression: stayed the same     GAIT:   Weightbearing Status: WBAT  Assistive Device(s): None  Gait Deviations:  slight trunk flexion, swings R arm more thank left due to transverse plane rotation and L trendelenburg    ROM: not officially tested on hips due to MDT eveal  Work mechanical stresses:  sits 8 hours a day  Leisure mechanical stresses: walking up hill and previous history of LBP      ADDITIONAL HISTORY:  Present  symptoms: L-sided buttock, hip and lateral leg pain  Pain quality: Sharp and Shooting  Paresthesia (yes/no):  no  Symptoms (improving/unchanging/worsening):  unchanged.   Symptoms commenced as a result of: not sure but did start walking uphill and got a new work chair       Symptoms at onset (back/thigh/leg): buttock  Constant symptoms (back/thigh/leg): buttock  Intermittent symptoms (back/thigh/leg): back and thigh    Symptoms are made worse with the following: standing after sitting, rising from a chair, walking and rolling in bed  Symptoms are made better with the following: sitting    Disturbed sleep (yes/no):  no Sleeping postures (prone/sup/side R/L): sides    Previous episodes (0/1-5/6-10/11+): 2 Year of first episode: 2018    Previous history: treated in PT for LBP in 2018 and 2019      Specific Questions:  Cough/Sneeze/Strain (pos/neg): no  Bowel/Bladder (normal/abnormal): normal  Gait (normal/abnormal): see gait  Medications (nil/NSAIDS/analg/steroids/anticoag/other):  None  Medical allergies:  none    MDT:  Repeated flexion in standing reproduced leg and butt sx especially when he was returning from the flexed position.     Prone on elbows was painful in his butt and low back (up to a 4-5/10) but improved as he did more and pain went away when he was prone. He is unable to do press-ups with rotation due to lack of mobility and pain.     Following walking after CECILIA, he reports he was about the same. After performing standing extensions (2 x 10 at the counter) he was able to walk with less pain (went from a 3/10 to a 1/10) in his L hip/buttock    Other Observations:     STRENGTH:  not tested      Assessment & Plan   CLINICAL IMPRESSIONS  Medical Diagnosis:      Treatment Diagnosis:     Impression/Assessment: Patient is a 63 year old male with L hip/low sage complaints.  The following significant findings have been identified: Pain, Decreased ROM/flexibility, Decreased joint mobility, and Decreased strength.  These impairments interfere with their ability to perform self care tasks, work tasks, recreational activities, household chores, and driving  as compared to previous level of function.     Clinical Decision Making (Complexity):  Clinical Presentation: Stable/Uncomplicated  Clinical Presentation Rationale: based on medical and personal factors listed in PT evaluation  Clinical Decision Making (Complexity): Low complexity    PLAN OF CARE  Treatment Interventions:  Interventions: Manual Therapy, Neuromuscular Re-education, Therapeutic Activity, Therapeutic Exercise    Long Term Goals            Frequency of Treatment:    Duration of Treatment:      Recommended Referrals to Other Professionals: Physical Therapy  Education Assessment:        Risks and benefits of evaluation/treatment have been explained.   Patient/Family/caregiver agrees with Plan of Care.     Evaluation Time:             Signing Clinician: Martha Smith, PT

## 2023-09-21 ENCOUNTER — THERAPY VISIT (OUTPATIENT)
Dept: PHYSICAL THERAPY | Facility: CLINIC | Age: 63
End: 2023-09-21
Attending: FAMILY MEDICINE
Payer: COMMERCIAL

## 2023-09-21 DIAGNOSIS — M25.552 HIP PAIN, LEFT: Primary | ICD-10-CM

## 2023-09-21 PROCEDURE — 97110 THERAPEUTIC EXERCISES: CPT | Mod: GP | Performed by: PHYSICAL THERAPIST

## 2023-09-21 PROCEDURE — 97530 THERAPEUTIC ACTIVITIES: CPT | Mod: GP | Performed by: PHYSICAL THERAPIST

## 2023-09-28 ENCOUNTER — THERAPY VISIT (OUTPATIENT)
Dept: PHYSICAL THERAPY | Facility: CLINIC | Age: 63
End: 2023-09-28
Attending: FAMILY MEDICINE
Payer: COMMERCIAL

## 2023-09-28 DIAGNOSIS — M25.552 HIP PAIN, LEFT: Primary | ICD-10-CM

## 2023-09-28 PROCEDURE — 97140 MANUAL THERAPY 1/> REGIONS: CPT | Mod: GP | Performed by: PHYSICAL THERAPIST

## 2023-09-28 PROCEDURE — 97110 THERAPEUTIC EXERCISES: CPT | Mod: GP | Performed by: PHYSICAL THERAPIST

## 2023-09-28 PROCEDURE — 97530 THERAPEUTIC ACTIVITIES: CPT | Mod: GP | Performed by: PHYSICAL THERAPIST

## 2023-10-27 DIAGNOSIS — K21.9 GASTROESOPHAGEAL REFLUX DISEASE WITHOUT ESOPHAGITIS: ICD-10-CM

## 2023-11-02 ENCOUNTER — THERAPY VISIT (OUTPATIENT)
Dept: PHYSICAL THERAPY | Facility: CLINIC | Age: 63
End: 2023-11-02
Payer: COMMERCIAL

## 2023-11-02 DIAGNOSIS — M25.552 HIP PAIN, LEFT: Primary | ICD-10-CM

## 2023-11-02 PROCEDURE — 97110 THERAPEUTIC EXERCISES: CPT | Mod: GP | Performed by: PHYSICAL THERAPIST

## 2023-11-02 PROCEDURE — 97530 THERAPEUTIC ACTIVITIES: CPT | Mod: GP | Performed by: PHYSICAL THERAPIST

## 2023-11-02 NOTE — PROGRESS NOTES
DISCHARGE  Reason for Discharge: Patient has met all goals.    Equipment Issued:     Discharge Plan: Patient to continue home program.   11/02/23 0500   Appointment Info   Signing clinician's name / credentials Billy Ratliff DPT   Total/Authorized Visits 6 (e and t) Cara   Visits Used 4   Medical Diagnosis hip pain L   PT Tx Diagnosis L lumbar radiculopathy and hip pain   Progress Note/Certification   Onset of illness/injury or Date of Surgery 08/02/23   Therapy Frequency 1x/wk for 4 weeks then every othr week for 4 weeks   Predicted Duration 8 weeks   Progress Note Completed Date 09/13/23   PT Goal 1   Goal Identifier walking   Goal Description improve pain and mobility to allow pt to walk 2 miles a day without pain or limitation   Rationale to maximize safety and independence with performance of ADLs and functional tasks;to maximize safety and independence within the home;to maximize safety and independence within the community   Goal Progress unrestircted walking   Target Date 11/08/23   Date Met 11/02/23   Subjective Report   Subjective Report Gael returns to PT after a little over a month. Has continued his HEP and is reporting 95% improvement. Has an occasiuonal twinge but most of the time he doesn't feel it.   Objective Measures   Objective Measures Objective Measure 1   Objective Measure 1   Objective Measure lumbar AROM   Details WNL with ERP on L SGIS   Treatment Interventions (PT)   Interventions Therapeutic Procedure/Exercise;Therapeutic Activity;Neuromuscular Re-education;Manual Therapy   Therapeutic Procedure/Exercise   Therapeutic Procedures: strength, endurance, ROM, flexibillity minutes (48229) 8   Ther Proc 1 2' discussion of plan for developng workout plan with  at the gym   PTRx Ther Proc 1 Standing Extension   PTRx Ther Proc 1 - Details x10 against sink=abolishes/dec pain on L SGIS    PTRx Ther Proc 2 Thoracic Extension   PTRx Ther Proc 2 - Details x10 with cues for technique use  of hands to support head/neck   PTRx Ther Proc 3 Posture Correction with Lumbar Roll   PTRx Ther Proc 3 - Details HEP   Therapeutic Activity   Therapeutic Activities: dynamic activities to improve functional performance minutes (82214) 12   Ther Act 1 12' TA pt demonstrates understnading of full reduction, maintenance timeframe, recovery of previous function, self management strategy/plan for reucurrence   PTRx Ther Act 1 Understanding Mechanical Diagnosis and Therapy: Centralization   PTRx Ther Act 1 - Details No Notes   Plan   Home program see ptrx   Updates to plan of care education   Plan for next session d/c to HEP as of 11/2/23   Total Session Time   Timed Code Treatment Minutes 20   Total Treatment Time (sum of timed and untimed services) 20         Referring Provider:  Idania Marroquin

## 2023-11-20 DIAGNOSIS — E78.2 MIXED HYPERLIPIDEMIA: ICD-10-CM

## 2023-11-20 RX ORDER — ATORVASTATIN CALCIUM 20 MG/1
TABLET, FILM COATED ORAL
Qty: 30 TABLET | Refills: 0 | Status: SHIPPED | OUTPATIENT
Start: 2023-11-20 | End: 2023-12-20

## 2023-12-12 ENCOUNTER — TELEPHONE (OUTPATIENT)
Dept: FAMILY MEDICINE | Facility: CLINIC | Age: 63
End: 2023-12-12
Payer: MEDICARE

## 2023-12-12 NOTE — TELEPHONE ENCOUNTER
Patient states he does not have symptoms of atrial fibration at this time. Denies any fever, chest pain, shortness of breath, wheezing, dizziness, lightheaded, or pain. Patient states he just feels anxiety and shaky, but otherwise feels okay. He states that his anxiety hasn't been this bad before, but thinks that it could be increased due to his alcohol use and withdrawal symptoms, and/or stress regarding visits with cardiology about his symptoms.     Patient will call his cardiologist to schedule an appointment after the call. RN assist in scheduling virtual visit with available provider 12/13 for further evaluation. Patient understands to return to ED if new or worsening symptoms develop.     CATHY Carpenter  [facility] Primary Care Triage

## 2023-12-12 NOTE — TELEPHONE ENCOUNTER
Patient called stating his call got disconnected with nurse below. Writer consulted with nurse and patient transferred to nurse below to finish triage.      CATHY Heredia  [facility]

## 2023-12-12 NOTE — TELEPHONE ENCOUNTER
Patient calling with concerns regarding his anxiety and heart rate questions. Patient was seen yesterday 12/11/2023 regarding tachycardia. He was discharged home with an increase of his Metoprolol dosage and Zofran PRN.     Patient states that when he got back home from ED, he had a 6 oz glass of wine. He usually drinks about 10-25 oz of wine per day. He is trying to limit the amount of alcohol he consumes, as he thinks it is having an effect on his heart rate concerns and anxiety.     Patient also reports that he had another 6 oz of wine at 2240 yesterday, and then felt very shaky. He's unsure if he's shaky from alcohol withdrawal or from anxiety. He reported that he took 1 tablet of Xanax at 0200, 0530, 0920, and 1008 due to his symptoms.     RN reviewed ED discharge instructions below with patient:      Patient states that he has not called to schedule follow-up visit with cardiology but is on the list of what he needs to do. He does have someone at home present with him, and confirmed with patient that he understands to return to ED for new or worsening symptoms.    RN attempted to further triage symptoms however patient was unable to hear writer. Writer ended call and tried calling patient back to triage. No answer and went directly to voicemail, and M to call clinic at [phone].     If patient calls back please follow triage workflow regarding anxiety and heart rate concerns.    CATHY Carpenter  [facility] Triage

## 2023-12-20 DIAGNOSIS — E78.2 MIXED HYPERLIPIDEMIA: ICD-10-CM

## 2023-12-20 RX ORDER — ATORVASTATIN CALCIUM 20 MG/1
TABLET, FILM COATED ORAL
Qty: 90 TABLET | Refills: 0 | Status: SHIPPED | OUTPATIENT
Start: 2023-12-20 | End: 2024-04-25

## 2024-01-17 ENCOUNTER — IMMUNIZATION (OUTPATIENT)
Dept: FAMILY MEDICINE | Facility: CLINIC | Age: 64
End: 2024-01-17
Payer: COMMERCIAL

## 2024-01-17 DIAGNOSIS — Z23 ENCOUNTER FOR IMMUNIZATION: Primary | ICD-10-CM

## 2024-01-17 PROCEDURE — 99207 PR NO CHARGE NURSE ONLY: CPT

## 2024-01-17 PROCEDURE — 90480 ADMN SARSCOV2 VAC 1/ONLY CMP: CPT

## 2024-01-17 PROCEDURE — 91320 SARSCV2 VAC 30MCG TRS-SUC IM: CPT

## 2024-01-18 DIAGNOSIS — F41.1 ANXIETY STATE: ICD-10-CM

## 2024-01-18 DIAGNOSIS — K21.9 GASTROESOPHAGEAL REFLUX DISEASE WITHOUT ESOPHAGITIS: ICD-10-CM

## 2024-01-19 RX ORDER — ALPRAZOLAM 0.5 MG
0.5 TABLET ORAL DAILY PRN
Qty: 30 TABLET | Refills: 0 | Status: SHIPPED | OUTPATIENT
Start: 2024-01-19 | End: 2024-03-02

## 2024-01-20 NOTE — TELEPHONE ENCOUNTER
Refill omeprazole now.  Has follow up appointment next week.    Refill request noted.  PMDP reviewed.  Next visit due next week    Pain contract  n/a

## 2024-01-22 ENCOUNTER — VIRTUAL VISIT (OUTPATIENT)
Dept: FAMILY MEDICINE | Facility: CLINIC | Age: 64
End: 2024-01-22
Payer: COMMERCIAL

## 2024-01-22 DIAGNOSIS — I48.0 PAROXYSMAL ATRIAL FIBRILLATION (H): ICD-10-CM

## 2024-01-22 DIAGNOSIS — F41.1 ANXIETY STATE: ICD-10-CM

## 2024-01-22 DIAGNOSIS — R05.3 CHRONIC COUGH: Primary | ICD-10-CM

## 2024-01-22 PROCEDURE — 99214 OFFICE O/P EST MOD 30 MIN: CPT | Mod: 95 | Performed by: FAMILY MEDICINE

## 2024-01-22 RX ORDER — METOPROLOL SUCCINATE 50 MG/1
100 TABLET, EXTENDED RELEASE ORAL EVERY EVENING
COMMUNITY
Start: 2024-01-22

## 2024-01-22 ASSESSMENT — ANXIETY QUESTIONNAIRES
3. WORRYING TOO MUCH ABOUT DIFFERENT THINGS: SEVERAL DAYS
7. FEELING AFRAID AS IF SOMETHING AWFUL MIGHT HAPPEN: NOT AT ALL
4. TROUBLE RELAXING: SEVERAL DAYS
6. BECOMING EASILY ANNOYED OR IRRITABLE: NOT AT ALL
8. IF YOU CHECKED OFF ANY PROBLEMS, HOW DIFFICULT HAVE THESE MADE IT FOR YOU TO DO YOUR WORK, TAKE CARE OF THINGS AT HOME, OR GET ALONG WITH OTHER PEOPLE?: NOT DIFFICULT AT ALL
IF YOU CHECKED OFF ANY PROBLEMS ON THIS QUESTIONNAIRE, HOW DIFFICULT HAVE THESE PROBLEMS MADE IT FOR YOU TO DO YOUR WORK, TAKE CARE OF THINGS AT HOME, OR GET ALONG WITH OTHER PEOPLE: NOT DIFFICULT AT ALL
5. BEING SO RESTLESS THAT IT IS HARD TO SIT STILL: NOT AT ALL
7. FEELING AFRAID AS IF SOMETHING AWFUL MIGHT HAPPEN: NOT AT ALL
2. NOT BEING ABLE TO STOP OR CONTROL WORRYING: NOT AT ALL
1. FEELING NERVOUS, ANXIOUS, OR ON EDGE: SEVERAL DAYS
GAD7 TOTAL SCORE: 3

## 2024-01-22 NOTE — PROGRESS NOTES
"Gael is a 63 year old who is being evaluated via a billable video visit.        Instructions Relayed to Patient by Virtual Roomer:     Patient is active on [employer]:   Relayed following to patient: \"It looks like you are active on [employer], are you able to join the visit this way? If not, do you need us to send you a link now or would you like your provider to send a link via text or email when they are ready to initiate the visit?\"    Reminded patient to ensure they were logged on to virtual visit by arrival time listed. Documented in appointment notes if patient had flexibility to initiate visit sooner than arrival time. If pediatric virtual visit, ensured pediatric patient along with parent/guardian will be present for video visit.     Patient offered the website [email or web address] and/or phone number to [employer] Help line: [phone]   How would you like to obtain your AVS? [place]  If the video visit is dropped, the invitation should be resent by: Send to e-mail at: [email or web address]  Will anyone else be joining your video visit? No          Assessment & Plan     Chronic cough  Awakening with symptoms - recommended  he take nonsedating antihistamine at night before bed and monitoring for symptoms control.  If symptoms not controlled with this can take benadryl.  Potential additive effect of benadryl and xanax discussed.  Do not take within 4 hours of each other.      Anxiety state  Less symptoms with decrease in alcohol use and using meditation.  Continue these lifestyle efforts.  As needed xanax if symptoms occur that are not controlled with meditation efforts.     Paroxysmal atrial fibrillation (H)  Continue follow up with cardiology -   EP evaluation in February.   Metoprolol for rate control and eliquis for anticoagulation.      Review of prior external note(s) from - CareEverywhere information from [facility] reviewed  Review of the result(s) of each unique test - troponin, TSH, ETOH " "  Prescription drug management      BMI  Estimated body mass index is 26.78 kg/m  as calculated from the following:    Height as of 8/17/23: 1.746 m (5' 8.75\").    Weight as of 8/17/23: 81.6 kg (180 lb).       Patient Instructions   To prevent the at night cough -take a non-sedating antihistamine - you can use claritin (loratidine) 10 mg OR zyrtec (cetrizine) 10 mg OR allegra (Fexofenadine) 180 mg. Each of these are dosed once daily before bed to prevent at night symptoms.  If these do not result in improvement in symptoms of cough, you can take benadryl but do not take within 4 hours of xanax.      Monitor anxiety symptoms with occasional use of xanax as needed for panic/high anxiety symptoms.  Continue to limit alcohol consumption and perform meditation to treat symptoms.      Follow up with cardiology and electrophysiology as scheduled.      Mirian Mayo is a 63 year old, presenting for the following health issues:  Recheck Medication,  Follow Up, and Heart Problem        1/22/2024     5:11 PM   Additional Questions   Roomed by Ag EASTON     History of Present Illness       Mental Health Follow-up:  Patient presents to follow-up on Depression & Anxiety.Patient's depression since last visit has been:  Worse  The patient is not having other symptoms associated with depression.  Patient's anxiety since last visit has been:  Better  The patient is not having other symptoms associated with anxiety.  Any significant life events: job concerns and financial concerns  Patient is not feeling anxious or having panic attacks.  Patient has concerns about alcohol or drug use.    He eats 2-3 servings of fruits and vegetables daily.He consumes 1 sweetened beverage(s) daily.He exercises with enough effort to increase his heart rate 9 or less minutes per day.  He exercises with enough effort to increase his heart rate 3 or less days per week.   He is taking medications regularly.     Since cutting alcohol much less anxiety. "  Awakening in middle of night with xanax use - did not use xanax this AM.  Has cut 50% alcohol compared with previous.          HR 70-90.          Hypertension Follow-up    Do you check your blood pressure regularly outside of the clinic? Yes   Are you following a low salt diet? Yes  Are your blood pressures ever more than 140 on the top number (systolic) OR more   than 90 on the bottom number (diastolic), for example 140/90? No    Atrial Fibrillation Follow-up    Symptoms: no recent chest pain, significant palpitations, dizziness/lightheadedness, dyspnea, or increased peripheral edema. and last episode of a.fib was in mid December.   Stroke prevention: DOAC (Eliquis, Xarelto, Pradaxa)  Off the sotalol and on higher dose of metoprolol without recurrent symptoms.  Has CT angiogram prior to Flecainide.  Has appointment with EP cardiology in February to assess for benefit from ablation.       6/13/2022     9:10 AM 6/13/2022     9:15 AM 8/22/2022     4:27 PM 8/17/2023     7:05 AM 8/17/2023     7:09 AM   Date   Pulse 97 102 79 63 74     Current [id number] Score: 2 points, which represents a 2.2% annual risk of major embolic event, without anti-coagulation or an LAAO device.       Anxiety Follow-Up  How are you doing with your anxiety since your last visit? Improved with decrease in alcohol use.  Are you having other symptoms that might be associated with anxiety? No  Have you had a significant life event? Health Concerns   Are you feeling depressed? No  Do you have any concerns with your use of alcohol or other drugs? No      Cough - chronic but episodic - at night generally up to 50% of the nights.   Resolves with benadryl but awakens at 4 am to take medication.      Social History     Tobacco Use    Smoking status: Never     Passive exposure: Never    Smokeless tobacco: Never   Vaping Use    Vaping Use: Never used   Substance Use Topics    Alcohol use: Yes     Comment: 3 drinks daily    Drug use: No         8/17/2023      8:10 AM 12/12/2023    12:18 PM 1/22/2024     8:24 AM   MIRIAN-7 SCORE   Total Score  15 (severe anxiety) 3 (minimal anxiety)   Total Score 14 15    15 3         5/26/2022     3:32 PM 6/28/2022     2:16 PM 8/17/2023     8:10 AM   PHQ   PHQ-9 Total Score 2 0 2   Q9: Thoughts of better off dead/self-harm past 2 weeks Not at all Not at all Not at all             Review of Systems  Constitutional, HEENT, cardiovascular, pulmonary, gi and gu systems are negative, except as otherwise noted.      Objective           Vitals:  No vitals were obtained today due to virtual visit.    Physical Exam   GENERAL: alert and no distress  EYES: Eyes grossly normal to inspection.  No discharge or erythema, or obvious scleral/conjunctival abnormalities.  RESP: No audible wheeze, cough, or visible cyanosis.    SKIN: Visible skin clear. No significant rash, abnormal pigmentation or lesions.  NEURO: Cranial nerves grossly intact.  Mentation and speech appropriate for age.  PSYCH: Appropriate affect, tone, and pace of words          Video-Visit Details    Type of service:  Video Visit     Originating Location (pt. Location): Home    Distant Location (provider location):  On-site  Platform used for Video Visit: Wilder  Signed Electronically by: Idania Marroquin MD

## 2024-01-23 NOTE — PATIENT INSTRUCTIONS
To prevent the at night cough -take a non-sedating antihistamine - you can use claritin (loratidine) 10 mg OR zyrtec (cetrizine) 10 mg OR allegra (Fexofenadine) 180 mg. Each of these are dosed once daily before bed to prevent at night symptoms.  If these do not result in improvement in symptoms of cough, you can take benadryl but do not take within 4 hours of xanax.      Monitor anxiety symptoms with occasional use of xanax as needed for panic/high anxiety symptoms.  Continue to limit alcohol consumption and perform meditation to treat symptoms.      Follow up with cardiology and electrophysiology as scheduled.

## 2024-02-08 ENCOUNTER — MYC MEDICAL ADVICE (OUTPATIENT)
Dept: FAMILY MEDICINE | Facility: CLINIC | Age: 64
End: 2024-02-08
Payer: MEDICARE

## 2024-02-12 DIAGNOSIS — I10 BENIGN ESSENTIAL HYPERTENSION: ICD-10-CM

## 2024-02-13 ENCOUNTER — ANCILLARY PROCEDURE (OUTPATIENT)
Dept: ULTRASOUND IMAGING | Facility: CLINIC | Age: 64
End: 2024-02-13
Attending: FAMILY MEDICINE
Payer: MEDICARE

## 2024-02-13 ENCOUNTER — MYC MEDICAL ADVICE (OUTPATIENT)
Dept: FAMILY MEDICINE | Facility: CLINIC | Age: 64
End: 2024-02-13

## 2024-02-13 DIAGNOSIS — Z13.6 ENCOUNTER FOR ABDOMINAL AORTIC ANEURYSM (AAA) SCREENING: ICD-10-CM

## 2024-02-13 DIAGNOSIS — R93.2 HIGHLY ECHOGENIC LIVER ON ULTRASOUND: Primary | ICD-10-CM

## 2024-02-13 PROCEDURE — 76706 US ABDL AORTA SCREEN AAA: CPT | Performed by: RADIOLOGY

## 2024-02-13 RX ORDER — AMLODIPINE BESYLATE 5 MG/1
TABLET ORAL
Qty: 90 TABLET | Refills: 0 | Status: SHIPPED | OUTPATIENT
Start: 2024-02-13 | End: 2024-05-14

## 2024-02-13 NOTE — TELEPHONE ENCOUNTER
No interpretation note available.     Routing to provider to review and advise.     CATHY Heredia  [facility]

## 2024-02-15 ENCOUNTER — ANCILLARY PROCEDURE (OUTPATIENT)
Dept: ULTRASOUND IMAGING | Facility: CLINIC | Age: 64
End: 2024-02-15
Attending: FAMILY MEDICINE
Payer: MEDICARE

## 2024-02-15 ENCOUNTER — LAB (OUTPATIENT)
Dept: LAB | Facility: CLINIC | Age: 64
End: 2024-02-15
Payer: MEDICARE

## 2024-02-15 ENCOUNTER — APPOINTMENT (OUTPATIENT)
Dept: LAB | Facility: CLINIC | Age: 64
End: 2024-02-15
Payer: MEDICARE

## 2024-02-15 DIAGNOSIS — Z12.5 SCREENING FOR PROSTATE CANCER: ICD-10-CM

## 2024-02-15 DIAGNOSIS — E78.2 MIXED HYPERLIPIDEMIA: ICD-10-CM

## 2024-02-15 DIAGNOSIS — R93.2 HIGHLY ECHOGENIC LIVER ON ULTRASOUND: ICD-10-CM

## 2024-02-15 DIAGNOSIS — Z13.1 SCREENING FOR DIABETES MELLITUS: ICD-10-CM

## 2024-02-15 DIAGNOSIS — I10 BENIGN ESSENTIAL HYPERTENSION: ICD-10-CM

## 2024-02-15 LAB
ALBUMIN SERPL BCG-MCNC: 4.2 G/DL (ref 3.5–5.2)
ALP SERPL-CCNC: 95 U/L (ref 40–150)
ALT SERPL W P-5'-P-CCNC: 35 U/L (ref 0–70)
ANION GAP SERPL CALCULATED.3IONS-SCNC: 13 MMOL/L (ref 7–15)
AST SERPL W P-5'-P-CCNC: 49 U/L (ref 0–45)
BILIRUB SERPL-MCNC: 0.7 MG/DL
BUN SERPL-MCNC: 12.2 MG/DL (ref 8–23)
CALCIUM SERPL-MCNC: 9.2 MG/DL (ref 8.8–10.2)
CHLORIDE SERPL-SCNC: 106 MMOL/L (ref 98–107)
CHOLEST SERPL-MCNC: 173 MG/DL
CREAT SERPL-MCNC: 0.75 MG/DL (ref 0.67–1.17)
DEPRECATED HCO3 PLAS-SCNC: 23 MMOL/L (ref 22–29)
EGFRCR SERPLBLD CKD-EPI 2021: >90 ML/MIN/1.73M2
FASTING STATUS PATIENT QL REPORTED: YES
GLUCOSE SERPL-MCNC: 107 MG/DL (ref 70–99)
HDLC SERPL-MCNC: 58 MG/DL
LDLC SERPL CALC-MCNC: 92 MG/DL
NONHDLC SERPL-MCNC: 115 MG/DL
POTASSIUM SERPL-SCNC: 3.9 MMOL/L (ref 3.4–5.3)
PROT SERPL-MCNC: 7.2 G/DL (ref 6.4–8.3)
PSA SERPL DL<=0.01 NG/ML-MCNC: 0.53 NG/ML (ref 0–4.5)
SODIUM SERPL-SCNC: 142 MMOL/L (ref 135–145)
TRIGL SERPL-MCNC: 114 MG/DL

## 2024-02-15 PROCEDURE — 36415 COLL VENOUS BLD VENIPUNCTURE: CPT

## 2024-02-15 PROCEDURE — 80061 LIPID PANEL: CPT

## 2024-02-15 PROCEDURE — G0103 PSA SCREENING: HCPCS

## 2024-02-15 PROCEDURE — 76705 ECHO EXAM OF ABDOMEN: CPT

## 2024-02-15 PROCEDURE — 80053 COMPREHEN METABOLIC PANEL: CPT

## 2024-02-19 NOTE — RESULT ENCOUNTER NOTE
"Kidney function is normal.  Liver testing shows one borderline elevated level  - this can often be seen with alcohol consumption. I would recommend you abstain from alcohol and follow up labs can be done in 2-4 weeks.  Blood sugar is listed as borderline high but this would be normal if you were not fasting.  If you were fasting, blood sugar is in the \"prediabetic\" range.  Exercise and limiting carbohydrate and sugars in diet can be helpful at preventing progression to diabetes.   Prostate cancer screening is negative/normal.  Cholesterol is under very good control with current treatment.  Please call or MyChart message me if you have any questions.      PSK"

## 2024-02-29 DIAGNOSIS — F41.1 ANXIETY STATE: ICD-10-CM

## 2024-03-02 RX ORDER — ALPRAZOLAM 0.5 MG
0.5 TABLET ORAL DAILY PRN
Qty: 30 TABLET | Refills: 0 | Status: SHIPPED | OUTPATIENT
Start: 2024-03-02 | End: 2024-04-07

## 2024-04-07 DIAGNOSIS — F41.1 ANXIETY STATE: ICD-10-CM

## 2024-04-07 RX ORDER — ALPRAZOLAM 0.5 MG
0.5 TABLET ORAL DAILY PRN
Qty: 30 TABLET | Refills: 0 | Status: SHIPPED | OUTPATIENT
Start: 2024-04-07 | End: 2024-07-05

## 2024-04-16 DIAGNOSIS — K76.0 FATTY LIVER: Primary | ICD-10-CM

## 2024-04-17 DIAGNOSIS — K21.9 GASTROESOPHAGEAL REFLUX DISEASE WITHOUT ESOPHAGITIS: ICD-10-CM

## 2024-04-25 ENCOUNTER — OFFICE VISIT (OUTPATIENT)
Dept: GASTROENTEROLOGY | Facility: CLINIC | Age: 64
End: 2024-04-25
Attending: FAMILY MEDICINE
Payer: COMMERCIAL

## 2024-04-25 ENCOUNTER — LAB (OUTPATIENT)
Dept: LAB | Facility: CLINIC | Age: 64
End: 2024-04-25
Payer: COMMERCIAL

## 2024-04-25 DIAGNOSIS — D68.9 COAGULOPATHY (H): ICD-10-CM

## 2024-04-25 DIAGNOSIS — K76.0 FATTY LIVER: ICD-10-CM

## 2024-04-25 DIAGNOSIS — R73.09 OTHER ABNORMAL GLUCOSE: ICD-10-CM

## 2024-04-25 DIAGNOSIS — K76.9 PARENCHYMAL LIVER DISEASE: ICD-10-CM

## 2024-04-25 DIAGNOSIS — R74.01 ELEVATED SGOT: ICD-10-CM

## 2024-04-25 DIAGNOSIS — K76.0 HEPATIC STEATOSIS: Primary | ICD-10-CM

## 2024-04-25 DIAGNOSIS — K76.0 HEPATIC STEATOSIS: ICD-10-CM

## 2024-04-25 PROBLEM — M25.552 HIP PAIN, LEFT: Status: RESOLVED | Noted: 2023-09-13 | Resolved: 2024-04-25

## 2024-04-25 PROBLEM — M54.42 ACUTE BILATERAL LOW BACK PAIN WITH LEFT-SIDED SCIATICA: Status: RESOLVED | Noted: 2018-05-25 | Resolved: 2024-04-25

## 2024-04-25 LAB
ALBUMIN SERPL BCG-MCNC: 4.5 G/DL (ref 3.5–5.2)
ALP SERPL-CCNC: 89 U/L (ref 40–150)
ALT SERPL W P-5'-P-CCNC: 35 U/L (ref 0–70)
ANION GAP SERPL CALCULATED.3IONS-SCNC: 12 MMOL/L (ref 7–15)
AST SERPL W P-5'-P-CCNC: 47 U/L (ref 0–45)
BILIRUB DIRECT SERPL-MCNC: 0.28 MG/DL (ref 0–0.3)
BILIRUB SERPL-MCNC: 0.9 MG/DL
BUN SERPL-MCNC: 12.6 MG/DL (ref 8–23)
CALCIUM SERPL-MCNC: 9.6 MG/DL (ref 8.8–10.2)
CHLORIDE SERPL-SCNC: 102 MMOL/L (ref 98–107)
CREAT SERPL-MCNC: 0.76 MG/DL (ref 0.67–1.17)
DEPRECATED HCO3 PLAS-SCNC: 25 MMOL/L (ref 22–29)
EGFRCR SERPLBLD CKD-EPI 2021: >90 ML/MIN/1.73M2
ERYTHROCYTE [DISTWIDTH] IN BLOOD BY AUTOMATED COUNT: 12.2 % (ref 10–15)
GLUCOSE SERPL-MCNC: 103 MG/DL (ref 70–99)
HBA1C MFR BLD: 5.2 % (ref 0–5.6)
HBV CORE AB SERPL QL IA: NONREACTIVE
HBV SURFACE AB SERPL IA-ACNC: 13.8 M[IU]/ML
HBV SURFACE AB SERPL IA-ACNC: REACTIVE M[IU]/ML
HBV SURFACE AG SERPL QL IA: NONREACTIVE
HCT VFR BLD AUTO: 46.5 % (ref 40–53)
HGB BLD-MCNC: 16.4 G/DL (ref 13.3–17.7)
INR PPP: 1.2 (ref 0.85–1.15)
MCH RBC QN AUTO: 33.2 PG (ref 26.5–33)
MCHC RBC AUTO-ENTMCNC: 35.3 G/DL (ref 31.5–36.5)
MCV RBC AUTO: 94 FL (ref 78–100)
PLATELET # BLD AUTO: 169 10E3/UL (ref 150–450)
POTASSIUM SERPL-SCNC: 4.2 MMOL/L (ref 3.4–5.3)
PROT SERPL-MCNC: 7.6 G/DL (ref 6.4–8.3)
RBC # BLD AUTO: 4.94 10E6/UL (ref 4.4–5.9)
SODIUM SERPL-SCNC: 139 MMOL/L (ref 135–145)
WBC # BLD AUTO: 6.9 10E3/UL (ref 4–11)

## 2024-04-25 PROCEDURE — 82248 BILIRUBIN DIRECT: CPT

## 2024-04-25 PROCEDURE — 80053 COMPREHEN METABOLIC PANEL: CPT

## 2024-04-25 PROCEDURE — 83036 HEMOGLOBIN GLYCOSYLATED A1C: CPT

## 2024-04-25 PROCEDURE — 86706 HEP B SURFACE ANTIBODY: CPT

## 2024-04-25 PROCEDURE — 85027 COMPLETE CBC AUTOMATED: CPT

## 2024-04-25 PROCEDURE — 85610 PROTHROMBIN TIME: CPT

## 2024-04-25 PROCEDURE — 86708 HEPATITIS A ANTIBODY: CPT

## 2024-04-25 PROCEDURE — 86704 HEP B CORE ANTIBODY TOTAL: CPT

## 2024-04-25 PROCEDURE — 36415 COLL VENOUS BLD VENIPUNCTURE: CPT

## 2024-04-25 PROCEDURE — 87340 HEPATITIS B SURFACE AG IA: CPT

## 2024-04-25 PROCEDURE — 99204 OFFICE O/P NEW MOD 45 MIN: CPT | Performed by: PHYSICIAN ASSISTANT

## 2024-04-25 RX ORDER — ATORVASTATIN CALCIUM 40 MG/1
40 TABLET, FILM COATED ORAL AT BEDTIME
COMMUNITY
Start: 2024-02-12

## 2024-04-25 RX ORDER — LORATADINE 10 MG/1
10 TABLET ORAL DAILY
COMMUNITY
Start: 2024-02-15

## 2024-04-25 NOTE — PROGRESS NOTES
Hepatology Clinic Note  Gael Le   Date of Birth 1960    REASON FOR CONSULT: Hepatic steatosis, elevated AST  REFERRING PROVIDER: Idania Marroquin MD         Assessment/plan:   Gael Le is a 64 YO M with PMHx significant for GERD, nontoxic multinodular goiter, hypercholesterolemia, paroxysmal A-fib on Eliquis, essential HTN and anxiety who presents for concerns regarding abnormal imaging of liver.  Referred by PCP.  No known underlying liver disease or condition.  No hx of liver biopsy.  Denies known family history of liver cancer or liver disease.    Concerns of hepatic steatosis on imaging   > Suspect both alcohol and metabolic related   Elevated AST (likely 2/2 alcohol use vs steatohepatitis)  Coagulopathy (likely driven by the fact that patient is on Eliquis)  Daily alcohol use    > Hx of heavier alcohol use in his 20s; admits he was sober from 1991 until 2016; prior to pandemic starting, was typically drinking 1-2 drinks per week; use increased when COVID started; patient currently drinking 3-5 glasses of wine per night   > No hx of DUI or legal ramifications 2/2 alcohol use   > No hx of completing chemical dependency treatment/program    Abd aortic screening US 2/13/24: Increased hepatic echogenicity and echotexture. Liver incompletely  evaluated. Differential includes cirrhosis, hepatic steatosis, and hepatitis. Liver lesions cannot be excluded. If clinically indicated, further eval with abdominal US or liver MR could be performed.    US abd limited 2/15/24: liver w/ craniocaudal dimension of 16.0 cm. No focal liver lesion. Marked diffuse increased echogenicity of liver parenchyma with significant attenuation of US beam. Gallbladder normal. No gallbladder wall thickening or pericholecystic fluid. No sonographic Jacinto's sign. CBD 3mm    FIB-4: 3.06 (AST: 47, ALT: 35, Platelets: 169, Age: 63)    Lab today: INR 1.2, albumin 4.5, T. bili 0.9, alk phos 89, AST 47, ALT 35,     No ongoing  laboratory or clinical evidence to support F3/F4 fibrosis or cirrhosis    PLAN:  - Reviewed recent labs and imaging  - Ordered fibroscan  - Discussed pathophysiology and natural hx of nonalcoholic fatty liver disease   - Recommend slow gradual weight loss. Discussed how a weight loss of 3-5% can show improvement on steatosis and weight loss of 7-10% can show improvement on fibrosis on histology.   - Maintain good control of cholesterol (pt is on statin)   - Optimize blood glucose as needed. Could consider GLP-1 inhibitor for both insulin resistance and help with weight loss  - Improve nutrition: limit carbs, especially simple carbs, and follow Mediterranean eating patten  - Increase physical activity as able, ideally 150 minutes weekly  - Limit alcohol intake to not more than 2-3 drinks a week   > Pt agrees to continue working on cutting back on his alcohol use and agrees to let me or PCP know if he feels as if he needs help or resources in doing so  - Follow with PCP as recommended  - Follow up with me in clinic in ~2 months with fibroscan same day   > Advised pt to come fasting     Ani Enamorado PA-C  [facility]   -----------------------------------------------------         HPI:     Patient presents today to clinic for consultation.  States he did not come fasting today.  Had blood work drawn before appointment.    States abnormalities regarding his liver were noted incidentally on an abdominal ultrasound of his aorta.  Has never been diagnosed with any liver disease or condition in the past.  Has never had a liver biopsy.  Denies known family history of liver disease.  Tells me he recently went to [facility] in [place] this past winter.  Admits that he is working on decreasing his alcohol use.  Is currently drinking 3-5 glasses of wine per night.  Typically drinks to feel a buzz or to feel more relaxed.  Admits that he had heavier alcohol use in his 20s.  Was sober from 1991 through  "2016.  From 2016 until the pandemic, was typically drinking 1-2 alcoholic beverages per week.  After the pandemic started, his alcohol use went up.  Admits to current life stressors including being laid off of his job in January and having a daughter who is disabled.  He is currently seeing a therapist.  States for the past 2 to 3 months, has cut out drinking a glass of wine in the middle of the night if he awoke from sleep.  Denies history of DUI.  Denies history of legal issues secondary to alcohol use.  In 2023 he enrolled in a program for alcoholism called \" [facility]\" but states he had a difficult time attending due to work at this time.  Has never completed chemical dependency treatment.  His goal moving forward is to be able to consume 1-2 alcoholic beverages per week.    Does have a history of dyslipidemia and hypertension.  Is currently on medication for both.  Also admits to history of A-fib that is well-controlled.  Is on Eliquis.  Follows with cardiology routinely as recommended.  Has never been diagnosed with LIZETT.  No history of thyroid dysfunction, prediabetes or diabetes.  He is working on eating healthier.  For the past 2 weeks, has been going to the gym every other day.  Patient states he used to routinely go to the gym before COVID.  Admits that he rarely eats red meat.    No current/former tobacco use.  No history of IV drug use.  No current illicit drug use.    Only abdominal surgery he has had was a hernia repair when he was 7 or 8 years old.    Previous work-up:   HAV Ab total: in process   HBV Sab: in process   HBV Sag: in process  HBV Cab: in process  TSH: 3.26   Cholesterol Total: 173  HDL: 58  LDL: 92  Triglycerides: 114  Hemoglobin A1c: in process    HCV antibody  HIV   BHAVESH   F-actin  AMA   Iron panel   Ferritin   Iron Sats:   TTG   Alpha-1-antitrypsin  Ceruloplasmin     PMH:    has a past medical history of Arthritis, Hypertension, Nontoxic multinodular goiter, Paroxysmal atrial " fibrillation (H) (11/04/2021), and Supraventricular premature beats.     SMH:    has a past surgical history that includes hernia repair; Finger surgery; Colonoscopy with CO2 insufflation (N/A, 11/21/2016); Abdomen surgery (1968); biopsy (1993); colonoscopy (11/21/2016); orthopedic surgery (1993); and Colonoscopy with CO2 insufflation (N/A, 6/13/2022).     Medications:   Current Outpatient Medications   Medication Sig Dispense Refill    ALPRAZolam (XANAX) 0.5 MG tablet TAKE 1 TABLET BY MOUTH EVERY DAY AS NEEDED FOR ANXIETY 30 tablet 0    amLODIPine (NORVASC) 5 MG tablet TAKE 1 TABLET BY MOUTH EVERY DAY 90 tablet 0    atorvastatin (LIPITOR) 20 MG tablet TAKE 1 TABLET BY MOUTH EVERY DAY 90 tablet 0    losartan-hydrochlorothiazide (HYZAAR) 50-12.5 MG tablet Take 1 tablet by mouth daily      metoprolol succinate ER (TOPROL XL) 50 MG 24 hr tablet Take 2 tablets (100 mg) by mouth every evening      Multiple Vitamin (MULTI-VITAMIN DAILY PO) Take  by mouth.      omeprazole (PRILOSEC) 20 MG DR capsule TAKE 1 CAPSULE (20 MG) BY MOUTH DAILY 30-60 MIN PRIOR TO A MEAL 90 capsule 0    omeprazole (PRILOSEC) 20 MG DR capsule Take 1 capsule (20 mg) by mouth daily      tadalafil (CIALIS) 5 MG tablet TAKE 1 TABLET BY MOUTH EVERY DAY 30 tablet 1     No current facility-administered medications for this visit.     Recent Labs   Lab Test 04/25/24  0932 02/15/24  1022 10/07/22  0939 05/13/21  0830 10/15/20  0949 06/11/19  0922 05/02/18  0755 09/01/17  0837 10/13/16  1000   ALKPHOS 89 95 73 74 88 78 74  --  75   ALT 35 35 41 55 58 36 40 45 45   AST 47* 49* 32 39 41 27 28  --  32   BILITOTAL 0.9 0.7 1.1 1.0 1.0 1.1 1.0  --  1.1           Allergies:     Allergies   Allergen Reactions    Amoxicillin Hives    Lisinopril-Hydrochlorothiazide Cough    Pcn [Penicillins] Hives          Social History:     Social History     Socioeconomic History    Marital status:      Spouse name: Not on file    Number of children: Not on file    Years of  education: Not on file    Highest education level: Not on file   Occupational History    Not on file   Tobacco Use    Smoking status: Never     Passive exposure: Never    Smokeless tobacco: Never   Vaping Use    Vaping status: Never Used   Substance and Sexual Activity    Alcohol use: Yes     Comment: 3-5 drinks daily    Drug use: Not Currently     Comment: no hx IVDU    Sexual activity: Yes     Partners: Female     Birth control/protection: Female Surgical     Comment: Cialis, Viagra   Other Topics Concern    Parent/sibling w/ CABG, MI or angioplasty before 65F 55M? No   Social History Narrative    Not on file     Social Determinants of Health     Financial Resource Strain: Low Risk  (1/22/2024)    Financial Resource Strain     Within the past 12 months, have you or your family members you live with been unable to get utilities (heat, electricity) when it was really needed?: No   Food Insecurity: Low Risk  (1/22/2024)    Food Insecurity     Within the past 12 months, did you worry that your food would run out before you got money to buy more?: No     Within the past 12 months, did the food you bought just not last and you didn t have money to get more?: No   Transportation Needs: Low Risk  (1/22/2024)    Transportation Needs     Within the past 12 months, has lack of transportation kept you from medical appointments, getting your medicines, non-medical meetings or appointments, work, or from getting things that you need?: No   Physical Activity: Not on file   Stress: Not on file   Social Connections: Unknown (1/7/2022)    [facility], [facility]    Social Connections     Frequency of Communication with Friends and Family: Not on file   Interpersonal Safety: Not on file   Housing Stability: Low Risk  (1/22/2024)    Housing Stability     Do you have housing? : Yes     Are you worried about losing your housing?: No          Family  "History:     Family History   Problem Relation Age of Onset    Dementia Mother         multiinfarct dementia    Hypertension Mother     Hyperlipidemia Mother     Cerebrovascular Disease Mother         MID    Depression Mother         Severe    Anxiety Disorder Mother     Diabetes Father         Onset late in life.    Cerebrovascular Disease Father         Left him quadriplegic for 2 yrs    Skin Cancer Father         BCC    Other Cancer Father         minor skin (not treated)    Obesity Father     Myocardial Infarction Paternal Grandmother     Esophageal Cancer Half-Brother     Colon Cancer No family hx of     Prostate Cancer No family hx of     Asthma No family hx of     Liver Disease No family hx of     Liver Cancer No family hx of           Review of Systems:   Gen: See HPI          Physical Exam:   Vital signs:                         Estimated body mass index is 26.78 kg/m  as calculated from the following:    Height as of 8/17/23: 1.746 m (5' 8.75\").    Weight as of 8/17/23: 81.6 kg (180 lb).  Gen: A&Ox3, NAD  HEENT: Sclera anicteric  CV: RRR, no overt murmurs  Lung: CTA, no wheezing or crackles.   Abd: soft, NT, ND, BS+X4, no palpable splenomegaly, liver is not palpable.   Ext: no edema, intact pulses.   Skin: No rash or jaundice  Neuro: grossly intact  Psych: appropriate mood and affects         Data:   Reviewed in person and significant for:    Lab Results   Component Value Date     04/25/2024     05/13/2021      Lab Results   Component Value Date    POTASSIUM 4.2 04/25/2024    POTASSIUM 3.8 10/07/2022    POTASSIUM 3.8 05/13/2021     Lab Results   Component Value Date    CHLORIDE 102 04/25/2024    CHLORIDE 105 10/07/2022    CHLORIDE 109 05/13/2021     Lab Results   Component Value Date    CO2 25 04/25/2024    CO2 28 10/07/2022    CO2 26 05/13/2021     Lab Results   Component Value Date    BUN 12.6 04/25/2024    BUN 16 10/07/2022    BUN 14 05/13/2021     Lab Results   Component Value Date    CR " "0.76 04/25/2024    CR 0.82 05/13/2021       Lab Results   Component Value Date    WBC 6.9 04/25/2024    WBC 6.2 05/13/2021     Lab Results   Component Value Date    HGB 16.4 04/25/2024    HGB 14.6 05/13/2021     Lab Results   Component Value Date    HCT 46.5 04/25/2024    HCT 42.2 05/13/2021     Lab Results   Component Value Date    MCV 94 04/25/2024    MCV 93 05/13/2021     Lab Results   Component Value Date     04/25/2024     05/13/2021       Lab Results   Component Value Date    AST 47 04/25/2024    AST 39 05/13/2021     Lab Results   Component Value Date    ALT 35 04/25/2024    ALT 55 05/13/2021     No results found for: \"BILICONJ\"   Lab Results   Component Value Date    BILITOTAL 0.9 04/25/2024    BILITOTAL 1.0 05/13/2021       Lab Results   Component Value Date    ALBUMIN 4.5 04/25/2024    ALBUMIN 3.7 10/07/2022    ALBUMIN 3.8 05/13/2021     Lab Results   Component Value Date    PROTTOTAL 7.6 04/25/2024    PROTTOTAL 7.0 05/13/2021      Lab Results   Component Value Date    ALKPHOS 89 04/25/2024    ALKPHOS 74 05/13/2021       Lab Results   Component Value Date    INR 1.20 04/25/2024       Imaging:      Right upper quadrant ultrasound 2/15/24     Comparisons: Aortic ultrasound 2/13/2024     HISTORY: Increased liver echogenicity on prior ultrasound     FINDINGS: The liver measures a craniocaudal dimension of 16.0 cm. No focal liver lesion. There is marked diffuse increased echogenicity of the liver parenchyma with significant attenuation of the ultrasound beam. The gallbladder is normal. There is no gallbladder wall thickening or pericholecystic fluid. No sonographic Jacinto's sign was elicited.  The diameter of the common bile duct measures 3mm. The pancreas is partially obscured but otherwise appears unremarkable. The aorta and IVC are visualized. The right kidney measures 11.8 cm. No solid renal mass, stone or hydronephrosis.                                                                 "   IMPRESSION:    Diffuse intrinsic hepatic parenchymal disease in a  pattern most compatible with severe diffuse fatty infiltration.     GIANNA MONTANA MD           ABDOMINAL AORTIC SCREENING ULTRASOUND 2/13/2024 8:07 AM     CLINICAL HISTORY: Encounter for abdominal aortic aneurysm (AAA)  screening. Screening abdominal aortic ultrasound requested.     COMPARISONS: None available.     ORDERING PROVIDER: CARLOTTA WILKES     TECHNIQUE: Grayscale images were obtained through the aorta and common  iliac arteries. Diameters were measured in the longitudinal and  transverse planes.      FINDINGS: Artery diameters (longitudinal / transverse):     Aorta:       Proximal: 2.8 cm /  2.8 x 2.6 cm       Mid: 2.2 cm /  2.0 x 2.0 cm       Distal: 1.6 cm /  1.7 x 2.0 cm     Right common iliac artery: 1.4 cm /  1.4 cm     Left common iliac artery: 1.3 cm /  1.4 cm     Liver is increased in echogenicity and echotexture but incompletely  evaluated in this study.     Bladder outpouchings incompletely demonstrated in this study.                                                                      IMPRESSION:  1. Ascending aorta measures up to 2.8 cm in diameter.     2. Increased hepatic echogenicity and echotexture. Liver incompletely  evaluated in this study. Differential includes cirrhosis, hepatic  steatosis, and hepatitis. Liver lesions cannot be excluded. If  clinically indicated, further evaluation with abdominal ultrasound or  liver MR could be performed.     3. Bladder outpouchings suggested but incompletely evaluated in this  study. Possible ureteroceles or diverticula. If clinically indicated,  further evaluation with cystoscopy or CT urogram/cystogram.     Aorta diameter measurement classification:  < 2.5 cm: Normal  2.5 - 2.9 cm: Ectatic  > 3 cm: Aneurysmal     Iliac artery:  Males: > or = 1.7 cm: Ectatic  Females: > or = 1.5 cm: Ectatic  > 2.5 cm: Aneurysmal     Nanette UGARTE, Sruthi G, Vladislav SHIPMAN et-al. Managing incidental  findings on  abdominal and pelvic CT and MRI, Part 2: white paper of the ACR  Incidental Findings Committee II on vascular findings. J Am Radha  Radiol. 2013;10 (10): 789-94. doi:[email or web address]      DIEUDONNE CHAPIN MD

## 2024-04-25 NOTE — LETTER
4/25/2024         RE: Gael Le  [address]  [facility] [address]        Dear Colleague,    Thank you for referring your patient, Gael Le, to the [facility]. Please see a copy of my visit note below.    Hepatology Clinic Note  Gael Le   Date of Birth 1960    REASON FOR CONSULT: Hepatic steatosis, elevated AST  REFERRING PROVIDER: Idania Marroquin MD         Assessment/plan:   Gael Le is a 62 YO M with PMHx significant for GERD, nontoxic multinodular goiter, hypercholesterolemia, paroxysmal A-fib on Eliquis, essential HTN and anxiety who presents for concerns regarding abnormal imaging of liver.  Referred by PCP.  No known underlying liver disease or condition.  No hx of liver biopsy.  Denies known family history of liver cancer or liver disease.    Concerns of hepatic steatosis on imaging   > Suspect both alcohol and metabolic related   Elevated AST (likely 2/2 alcohol use vs steatohepatitis)  Coagulopathy (likely driven by the fact that patient is on Eliquis)  Daily alcohol use    > Hx of heavier alcohol use in his 20s; admits he was sober from 1991 until 2016; prior to pandemic starting, was typically drinking 1-2 drinks per week; use increased when COVID started; patient currently drinking 3-5 glasses of wine per night   > No hx of DUI or legal ramifications 2/2 alcohol use   > No hx of completing chemical dependency treatment/program    Abd aortic screening US 2/13/24: Increased hepatic echogenicity and echotexture. Liver incompletely  evaluated. Differential includes cirrhosis, hepatic steatosis, and hepatitis. Liver lesions cannot be excluded. If clinically indicated, further eval with abdominal US or liver MR could be performed.    US abd limited 2/15/24: liver w/ craniocaudal dimension of 16.0 cm. No focal liver lesion. Marked diffuse increased echogenicity of liver parenchyma with significant attenuation of US beam. Gallbladder  normal. No gallbladder wall thickening or pericholecystic fluid. No sonographic Jacinto's sign. CBD 3mm    FIB-4: 3.06 (AST: 47, ALT: 35, Platelets: 169, Age: 63)    Lab today: INR 1.2, albumin 4.5, T. bili 0.9, alk phos 89, AST 47, ALT 35,     No ongoing laboratory or clinical evidence to support F3/F4 fibrosis or cirrhosis    PLAN:  - Reviewed recent labs and imaging  - Ordered fibroscan  - Discussed pathophysiology and natural hx of nonalcoholic fatty liver disease   - Recommend slow gradual weight loss. Discussed how a weight loss of 3-5% can show improvement on steatosis and weight loss of 7-10% can show improvement on fibrosis on histology.   - Maintain good control of cholesterol (pt is on statin)   - Optimize blood glucose as needed. Could consider GLP-1 inhibitor for both insulin resistance and help with weight loss  - Improve nutrition: limit carbs, especially simple carbs, and follow Mediterranean eating patten  - Increase physical activity as able, ideally 150 minutes weekly  - Limit alcohol intake to not more than 2-3 drinks a week   > Pt agrees to continue working on cutting back on his alcohol use and agrees to let me or PCP know if he feels as if he needs help or resources in doing so  - Follow with PCP as recommended  - Follow up with me in clinic in ~2 months with fibroscan same day   > Advised pt to come fasting     Ani Enamorado PA-C  [facility] Hepatology   -----------------------------------------------------         HPI:     Patient presents today to clinic for consultation.  States he did not come fasting today.  Had blood work drawn before appointment.    States abnormalities regarding his liver were noted incidentally on an abdominal ultrasound of his aorta.  Has never been diagnosed with any liver disease or condition in the past.  Has never had a liver biopsy.  Denies known family history of liver disease.  Tells me he recently went to [facility] in [place] this  "past winter.  Admits that he is working on decreasing his alcohol use.  Is currently drinking 3-5 glasses of wine per night.  Typically drinks to feel a buzz or to feel more relaxed.  Admits that he had heavier alcohol use in his 20s.  Was sober from 1991 through 2016.  From 2016 until the pandemic, was typically drinking 1-2 alcoholic beverages per week.  After the pandemic started, his alcohol use went up.  Admits to current life stressors including being laid off of his job in January and having a daughter who is disabled.  He is currently seeing a therapist.  States for the past 2 to 3 months, has cut out drinking a glass of wine in the middle of the night if he awoke from sleep.  Denies history of DUI.  Denies history of legal issues secondary to alcohol use.  In 2023 he enrolled in a program for alcoholism called \" [facility]\" but states he had a difficult time attending due to work at this time.  Has never completed chemical dependency treatment.  His goal moving forward is to be able to consume 1-2 alcoholic beverages per week.    Does have a history of dyslipidemia and hypertension.  Is currently on medication for both.  Also admits to history of A-fib that is well-controlled.  Is on Eliquis.  Follows with cardiology routinely as recommended.  Has never been diagnosed with LIZETT.  No history of thyroid dysfunction, prediabetes or diabetes.  He is working on eating healthier.  For the past 2 weeks, has been going to the gym every other day.  Patient states he used to routinely go to the gym before COVID.  Admits that he rarely eats red meat.    No current/former tobacco use.  No history of IV drug use.  No current illicit drug use.    Only abdominal surgery he has had was a hernia repair when he was 7 or 8 years old.    Previous work-up:   HAV Ab total: in process   HBV Sab: in process   HBV Sag: in process  HBV Cab: in process  TSH: 3.26   Cholesterol Total: 173  HDL: 58  LDL: 92  Triglycerides: " 114  Hemoglobin A1c: in process    HCV antibody  HIV   BHAVESH   F-actin  AMA   Iron panel   Ferritin   Iron Sats:   TTG   Alpha-1-antitrypsin  Ceruloplasmin     PMH:    has a past medical history of Arthritis, Hypertension, Nontoxic multinodular goiter, Paroxysmal atrial fibrillation (H) (11/04/2021), and Supraventricular premature beats.     SMH:    has a past surgical history that includes hernia repair; Finger surgery; Colonoscopy with CO2 insufflation (N/A, 11/21/2016); Abdomen surgery (1968); biopsy (1993); colonoscopy (11/21/2016); orthopedic surgery (1993); and Colonoscopy with CO2 insufflation (N/A, 6/13/2022).     Medications:   Current Outpatient Medications   Medication Sig Dispense Refill     ALPRAZolam (XANAX) 0.5 MG tablet TAKE 1 TABLET BY MOUTH EVERY DAY AS NEEDED FOR ANXIETY 30 tablet 0     amLODIPine (NORVASC) 5 MG tablet TAKE 1 TABLET BY MOUTH EVERY DAY 90 tablet 0     atorvastatin (LIPITOR) 20 MG tablet TAKE 1 TABLET BY MOUTH EVERY DAY 90 tablet 0     losartan-hydrochlorothiazide (HYZAAR) 50-12.5 MG tablet Take 1 tablet by mouth daily       metoprolol succinate ER (TOPROL XL) 50 MG 24 hr tablet Take 2 tablets (100 mg) by mouth every evening       Multiple Vitamin (MULTI-VITAMIN DAILY PO) Take  by mouth.       omeprazole (PRILOSEC) 20 MG DR capsule TAKE 1 CAPSULE (20 MG) BY MOUTH DAILY 30-60 MIN PRIOR TO A MEAL 90 capsule 0     omeprazole (PRILOSEC) 20 MG DR capsule Take 1 capsule (20 mg) by mouth daily       tadalafil (CIALIS) 5 MG tablet TAKE 1 TABLET BY MOUTH EVERY DAY 30 tablet 1     No current facility-administered medications for this visit.     Recent Labs   Lab Test 04/25/24  0932 02/15/24  1022 10/07/22  0939 05/13/21  0830 10/15/20  0949 06/11/19  0922 05/02/18  0755 09/01/17  0837 10/13/16  1000   ALKPHOS 89 95 73 74 88 78 74  --  75   ALT 35 35 41 55 58 36 40 45 45   AST 47* 49* 32 39 41 27 28  --  32   BILITOTAL 0.9 0.7 1.1 1.0 1.0 1.1 1.0  --  1.1           Allergies:     Allergies    Allergen Reactions     Amoxicillin Hives     Lisinopril-Hydrochlorothiazide Cough     Pcn [Penicillins] Hives          Social History:     Social History     Socioeconomic History     Marital status:      Spouse name: Not on file     Number of children: Not on file     Years of education: Not on file     Highest education level: Not on file   Occupational History     Not on file   Tobacco Use     Smoking status: Never     Passive exposure: Never     Smokeless tobacco: Never   Vaping Use     Vaping status: Never Used   Substance and Sexual Activity     Alcohol use: Yes     Comment: 3-5 drinks daily     Drug use: Not Currently     Comment: no hx IVDU     Sexual activity: Yes     Partners: Female     Birth control/protection: Female Surgical     Comment: Cialis, Viagra   Other Topics Concern     Parent/sibling w/ CABG, MI or angioplasty before 65F 55M? No   Social History Narrative     Not on file     Social Determinants of Health     Financial Resource Strain: Low Risk  (1/22/2024)    Financial Resource Strain      Within the past 12 months, have you or your family members you live with been unable to get utilities (heat, electricity) when it was really needed?: No   Food Insecurity: Low Risk  (1/22/2024)    Food Insecurity      Within the past 12 months, did you worry that your food would run out before you got money to buy more?: No      Within the past 12 months, did the food you bought just not last and you didn t have money to get more?: No   Transportation Needs: Low Risk  (1/22/2024)    Transportation Needs      Within the past 12 months, has lack of transportation kept you from medical appointments, getting your medicines, non-medical meetings or appointments, work, or from getting things that you need?: No   Physical Activity: Not on file   Stress: Not on file   Social Connections: Unknown (1/7/2022)    Received from [facility] [employer] [facility], [facility] [employer] [facility]  "Affiliates    Social Connections      Frequency of Communication with Friends and Family: Not on file   Interpersonal Safety: Not on file   Housing Stability: Low Risk  (1/22/2024)    Housing Stability      Do you have housing? : Yes      Are you worried about losing your housing?: No          Family History:     Family History   Problem Relation Age of Onset     Dementia Mother         multiinfarct dementia     Hypertension Mother      Hyperlipidemia Mother      Cerebrovascular Disease Mother         MID     Depression Mother         Severe     Anxiety Disorder Mother      Diabetes Father         Onset late in life.     Cerebrovascular Disease Father         Left him quadriplegic for 2 yrs     Skin Cancer Father         BCC     Other Cancer Father         minor skin (not treated)     Obesity Father      Myocardial Infarction Paternal Grandmother      Esophageal Cancer Half-Brother      Colon Cancer No family hx of      Prostate Cancer No family hx of      Asthma No family hx of      Liver Disease No family hx of      Liver Cancer No family hx of           Review of Systems:   Gen: See HPI          Physical Exam:   Vital signs:                         Estimated body mass index is 26.78 kg/m  as calculated from the following:    Height as of 8/17/23: 1.746 m (5' 8.75\").    Weight as of 8/17/23: 81.6 kg (180 lb).  Gen: A&Ox3, NAD  HEENT: Sclera anicteric  CV: RRR, no overt murmurs  Lung: CTA, no wheezing or crackles.   Abd: soft, NT, ND, BS+X4, no palpable splenomegaly, liver is not palpable.   Ext: no edema, intact pulses.   Skin: No rash or jaundice  Neuro: grossly intact  Psych: appropriate mood and affects         Data:   Reviewed in person and significant for:    Lab Results   Component Value Date     04/25/2024     05/13/2021      Lab Results   Component Value Date    POTASSIUM 4.2 04/25/2024    POTASSIUM 3.8 10/07/2022    POTASSIUM 3.8 05/13/2021     Lab Results   Component Value Date    CHLORIDE " "102 04/25/2024    CHLORIDE 105 10/07/2022    CHLORIDE 109 05/13/2021     Lab Results   Component Value Date    CO2 25 04/25/2024    CO2 28 10/07/2022    CO2 26 05/13/2021     Lab Results   Component Value Date    BUN 12.6 04/25/2024    BUN 16 10/07/2022    BUN 14 05/13/2021     Lab Results   Component Value Date    CR 0.76 04/25/2024    CR 0.82 05/13/2021       Lab Results   Component Value Date    WBC 6.9 04/25/2024    WBC 6.2 05/13/2021     Lab Results   Component Value Date    HGB 16.4 04/25/2024    HGB 14.6 05/13/2021     Lab Results   Component Value Date    HCT 46.5 04/25/2024    HCT 42.2 05/13/2021     Lab Results   Component Value Date    MCV 94 04/25/2024    MCV 93 05/13/2021     Lab Results   Component Value Date     04/25/2024     05/13/2021       Lab Results   Component Value Date    AST 47 04/25/2024    AST 39 05/13/2021     Lab Results   Component Value Date    ALT 35 04/25/2024    ALT 55 05/13/2021     No results found for: \"BILICONJ\"   Lab Results   Component Value Date    BILITOTAL 0.9 04/25/2024    BILITOTAL 1.0 05/13/2021       Lab Results   Component Value Date    ALBUMIN 4.5 04/25/2024    ALBUMIN 3.7 10/07/2022    ALBUMIN 3.8 05/13/2021     Lab Results   Component Value Date    PROTTOTAL 7.6 04/25/2024    PROTTOTAL 7.0 05/13/2021      Lab Results   Component Value Date    ALKPHOS 89 04/25/2024    ALKPHOS 74 05/13/2021       Lab Results   Component Value Date    INR 1.20 04/25/2024       Imaging:      Right upper quadrant ultrasound 2/15/24     Comparisons: Aortic ultrasound 2/13/2024     HISTORY: Increased liver echogenicity on prior ultrasound     FINDINGS: The liver measures a craniocaudal dimension of 16.0 cm. No focal liver lesion. There is marked diffuse increased echogenicity of the liver parenchyma with significant attenuation of the ultrasound beam. The gallbladder is normal. There is no gallbladder wall thickening or pericholecystic fluid. No sonographic Jacinto's sign was " elicited.  The diameter of the common bile duct measures 3mm. The pancreas is partially obscured but otherwise appears unremarkable. The aorta and IVC are visualized. The right kidney measures 11.8 cm. No solid renal mass, stone or hydronephrosis.                                                                   IMPRESSION:    Diffuse intrinsic hepatic parenchymal disease in a  pattern most compatible with severe diffuse fatty infiltration.     GIANNA MONTANA MD           ABDOMINAL AORTIC SCREENING ULTRASOUND 2/13/2024 8:07 AM     CLINICAL HISTORY: Encounter for abdominal aortic aneurysm (AAA)  screening. Screening abdominal aortic ultrasound requested.     COMPARISONS: None available.     ORDERING PROVIDER: CARLOTTA WILKES     TECHNIQUE: Grayscale images were obtained through the aorta and common  iliac arteries. Diameters were measured in the longitudinal and  transverse planes.      FINDINGS: Artery diameters (longitudinal / transverse):     Aorta:       Proximal: 2.8 cm /  2.8 x 2.6 cm       Mid: 2.2 cm /  2.0 x 2.0 cm       Distal: 1.6 cm /  1.7 x 2.0 cm     Right common iliac artery: 1.4 cm /  1.4 cm     Left common iliac artery: 1.3 cm /  1.4 cm     Liver is increased in echogenicity and echotexture but incompletely  evaluated in this study.     Bladder outpouchings incompletely demonstrated in this study.                                                                      IMPRESSION:  1. Ascending aorta measures up to 2.8 cm in diameter.     2. Increased hepatic echogenicity and echotexture. Liver incompletely  evaluated in this study. Differential includes cirrhosis, hepatic  steatosis, and hepatitis. Liver lesions cannot be excluded. If  clinically indicated, further evaluation with abdominal ultrasound or  liver MR could be performed.     3. Bladder outpouchings suggested but incompletely evaluated in this  study. Possible ureteroceles or diverticula. If clinically indicated,  further evaluation  with cystoscopy or CT urogram/cystogram.     Aorta diameter measurement classification:  < 2.5 cm: Normal  2.5 - 2.9 cm: Ectatic  > 3 cm: Aneurysmal     Iliac artery:  Males: > or = 1.7 cm: Ectatic  Females: > or = 1.5 cm: Ectatic  > 2.5 cm: Aneurysmal     Nanette UGARTE, Sruthi G, Vladislav SHIPMAN et-al. Managing incidental findings on  abdominal and pelvic CT and MRI, Part 2: white paper of the ACR  Incidental Findings Committee II on vascular findings. J Am Radha  Radiol. 2013;10 (10): 789-94. doi:10.1016/[email or web address]      DIEUDONNE CHAPIN MD             Again, thank you for allowing me to participate in the care of your patient.        Sincerely,        Ani Enamorado PA-C

## 2024-04-25 NOTE — NURSING NOTE
Chief Complaint   Patient presents with    New Patient     New consult for severe diffuse parenchymal liver disease.     Unable to obtain patient's vital signs.       Eden Garrett CMA

## 2024-04-25 NOTE — Clinical Note
[place] [facility] [place]   Thanks,   Ani

## 2024-04-26 ENCOUNTER — TELEPHONE (OUTPATIENT)
Dept: GASTROENTEROLOGY | Facility: CLINIC | Age: 64
End: 2024-04-26
Payer: COMMERCIAL

## 2024-04-26 LAB — HAV AB SER QL IA: REACTIVE

## 2024-04-29 ENCOUNTER — TELEPHONE (OUTPATIENT)
Dept: GASTROENTEROLOGY | Facility: CLINIC | Age: 64
End: 2024-04-29
Payer: COMMERCIAL

## 2024-04-29 NOTE — TELEPHONE ENCOUNTER
[facility] Call Center    Phone Message    May a detailed message be left on voicemail: yes     Reason for Call: Other: pt scheduled for fibro scan on 7/2. Per guidelines if there is no order send a TE requesting an order.      Action Taken: Other: hepa    Travel Screening: Not Applicable

## 2024-05-12 DIAGNOSIS — I10 BENIGN ESSENTIAL HYPERTENSION: ICD-10-CM

## 2024-05-14 RX ORDER — AMLODIPINE BESYLATE 5 MG/1
TABLET ORAL
Qty: 90 TABLET | Refills: 0 | Status: SHIPPED | OUTPATIENT
Start: 2024-05-14 | End: 2024-08-13

## 2024-06-10 ENCOUNTER — MYC MEDICAL ADVICE (OUTPATIENT)
Dept: FAMILY MEDICINE | Facility: CLINIC | Age: 64
End: 2024-06-10

## 2024-06-10 ENCOUNTER — E-VISIT (OUTPATIENT)
Dept: FAMILY MEDICINE | Facility: CLINIC | Age: 64
End: 2024-06-10
Payer: COMMERCIAL
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
## 2024-06-10 DIAGNOSIS — L03.115 CELLULITIS OF RIGHT LOWER EXTREMITY: Primary | ICD-10-CM

## 2024-06-10 DIAGNOSIS — S70.361A INSECT BITE OF RIGHT THIGH, INITIAL ENCOUNTER: ICD-10-CM

## 2024-06-10 PROCEDURE — 99421 OL DIG E/M SVC 5-10 MIN: CPT | Performed by: FAMILY MEDICINE

## 2024-06-11 ENCOUNTER — IMMUNIZATION (OUTPATIENT)
Dept: FAMILY MEDICINE | Facility: CLINIC | Age: 64
End: 2024-06-11
Payer: COMMERCIAL

## 2024-06-11 DIAGNOSIS — Z23 ENCOUNTER FOR IMMUNIZATION: Primary | ICD-10-CM

## 2024-06-11 PROCEDURE — 99207 PR NO CHARGE NURSE ONLY: CPT

## 2024-06-11 PROCEDURE — 90480 ADMN SARSCOV2 VAC 1/ONLY CMP: CPT

## 2024-06-11 PROCEDURE — 91320 SARSCV2 VAC 30MCG TRS-SUC IM: CPT

## 2024-06-11 RX ORDER — MUPIROCIN 20 MG/G
OINTMENT TOPICAL 3 TIMES DAILY
Qty: 30 G | Refills: 0 | Status: SHIPPED | OUTPATIENT
Start: 2024-06-11

## 2024-06-11 NOTE — PROGRESS NOTES

## 2024-06-17 PROBLEM — Z71.89 ADVANCED DIRECTIVES, COUNSELING/DISCUSSION: Status: RESOLVED | Noted: 2018-04-27 | Resolved: 2024-06-17

## 2024-06-28 ENCOUNTER — OFFICE VISIT (OUTPATIENT)
Dept: ORTHOPEDICS | Facility: CLINIC | Age: 64
End: 2024-06-28
Payer: COMMERCIAL

## 2024-06-28 DIAGNOSIS — R20.2 PARESTHESIA OF BOTH FEET: Primary | ICD-10-CM

## 2024-06-28 PROCEDURE — 99204 OFFICE O/P NEW MOD 45 MIN: CPT | Performed by: FAMILY MEDICINE

## 2024-06-28 NOTE — PROGRESS NOTES
[facility]  SPORTS MEDICINE CLINIC VISIT     Jun 28, 2024        ASSESSMENT & PLAN    63-year-old with chronic bilateral toe numbness.  Has been stable for years.  Suspect peripheral neuropathy.  No specific cause currently identified    Reviewed imaging and assessment with patient in detail  We will pursue brief initial screening workup with labs.  Orders were placed and he will report to the lab at his convenience.  We additionally discussed footwear and activity modifications.  If lab workup is normal and he continues to have bothersome symptoms would consider neurology follow-up/    Harish Sigala MD  [facility] SPORTS MEDICINE [facility]    -----  Chief Complaint   Patient presents with    Consult For     Toes numbness       SUBJECTIVE  Gael Le is a/an 63 year old male who is seen as a self referral for evaluation of bilateral foot numbness. Does not random left low back pain and has been seeing PT for that.     The patient is seen by themselves.  The patient is Right handed    Onset: 3 years(s) ago. Reports insidious onset without acute precipitating event.  Location of Pain: bilateral feet - primarily the toes.  Symptoms are symmetric.  Worsened by: walking for long distances on hard surfaces.   Better with: Has not noted anything that alleviates symptoms.  Treatments tried: No treatment to date- does wear orthotics  Associated symptoms: numbness and tingling    Orthopedic/Surgical history: NO  Social History/Occupation:       REVIEW OF SYSTEMS:  Do you have fever, chills, weight loss? No  Do you have any vision problems? No  Do you have any chest pain or edema? No  Do you have any shortness of breath or wheezing?  No  Do you have stomach problems? No  Do you have any numbness or focal weakness? No  Do you have diabetes? No  Do you have problems with bleeding or clotting? No  Do you have an rashes or other skin lesions? No    OBJECTIVE:  There were no vitals  taken for this visit.     Bilateral feet: Warm and well-perfused.  Cap refill brisk.  2+ dorsalis pedis pulses.  Sensation intact to light touch throughout.  Normal bilateral monofilament exam.  Normal range of motion of the ankles and feet.  Strength intact.    RADIOLOGY:    No imaging this visit     Reviewed previous lab work dated 4/25/2024.  This demonstrates essentially normal CBC, BMP, LFTs as well as hemoglobin A1c.

## 2024-06-28 NOTE — LETTER
6/28/2024      Gael Le  [address]  [facility] [address]      Dear Colleague,    Thank you for referring your patient, Gael Le, to the [facility] SPORTS [facility]. Please see a copy of my visit note below.      [facility]  SPORTS MEDICINE CLINIC VISIT     Jun 28, 2024        ASSESSMENT & PLAN    63-year-old with chronic bilateral toe numbness.  Has been stable for years.  Suspect peripheral neuropathy.  No specific cause currently identified    Reviewed imaging and assessment with patient in detail  We will pursue brief initial screening workup with labs.  Orders were placed and he will report to the lab at his convenience.  We additionally discussed footwear and activity modifications.  If lab workup is normal and he continues to have bothersome symptoms would consider neurology follow-up/    Harish Sigala MD  [facility]    -----  Chief Complaint   Patient presents with     Consult For     Toes numbness       SUBJECTIVE  Gael Le is a/an 63 year old male who is seen as a self referral for evaluation of bilateral foot numbness. Does not random left low back pain and has been seeing PT for that.     The patient is seen by themselves.  The patient is Right handed    Onset: 3 years(s) ago. Reports insidious onset without acute precipitating event.  Location of Pain: bilateral feet - primarily the toes.  Symptoms are symmetric.  Worsened by: walking for long distances on hard surfaces.   Better with: Has not noted anything that alleviates symptoms.  Treatments tried: No treatment to date- does wear orthotics  Associated symptoms: numbness and tingling    Orthopedic/Surgical history: NO  Social History/Occupation:       REVIEW OF SYSTEMS:  Do you have fever, chills, weight loss? No  Do you have any vision problems? No  Do you have any chest pain or edema? No  Do you have any shortness of breath or  wheezing?  No  Do you have stomach problems? No  Do you have any numbness or focal weakness? No  Do you have diabetes? No  Do you have problems with bleeding or clotting? No  Do you have an rashes or other skin lesions? No    OBJECTIVE:  There were no vitals taken for this visit.     Bilateral feet: Warm and well-perfused.  Cap refill brisk.  2+ dorsalis pedis pulses.  Sensation intact to light touch throughout.  Normal bilateral monofilament exam.  Normal range of motion of the ankles and feet.  Strength intact.    RADIOLOGY:    No imaging this visit     Reviewed previous lab work dated 4/25/2024.  This demonstrates essentially normal CBC, BMP, LFTs as well as hemoglobin A1c.      Again, thank you for allowing me to participate in the care of your patient.        Sincerely,        Harish Sigala MD

## 2024-07-04 DIAGNOSIS — F41.1 ANXIETY STATE: ICD-10-CM

## 2024-07-05 RX ORDER — ALPRAZOLAM 0.5 MG
0.5 TABLET ORAL DAILY PRN
Qty: 30 TABLET | Refills: 0 | Status: SHIPPED | OUTPATIENT
Start: 2024-07-05

## 2024-07-05 NOTE — TELEPHONE ENCOUNTER
Refill request noted.  PMDP reviewed.  Next visit due   now.  Last refill 30 pills in April.    Pain contract  n/a

## 2024-08-12 DIAGNOSIS — I10 BENIGN ESSENTIAL HYPERTENSION: ICD-10-CM

## 2024-08-13 RX ORDER — AMLODIPINE BESYLATE 5 MG/1
TABLET ORAL
Qty: 90 TABLET | Refills: 0 | Status: SHIPPED | OUTPATIENT
Start: 2024-08-13

## 2024-08-22 DIAGNOSIS — K21.9 GASTROESOPHAGEAL REFLUX DISEASE WITHOUT ESOPHAGITIS: ICD-10-CM

## 2024-08-30 ENCOUNTER — TELEPHONE (OUTPATIENT)
Dept: FAMILY MEDICINE | Facility: CLINIC | Age: 64
End: 2024-08-30
Payer: COMMERCIAL

## 2024-08-30 NOTE — TELEPHONE ENCOUNTER
Reason for Call:  Appointment Request    Patient requesting this type of appt:  Preventive     Requested provider: Idania Marroquin    Reason patient unable to be scheduled: Not within requested timeframe    When does patient want to be seen/preferred time:  sometime this year    Comments: had an appt in August, but was out of town. Is ok with a video visit    Could we send this information to you in [place] or would you prefer to receive a phone call?:   Patient would like to be contacted via [place]    Call taken on 8/30/2024 at 12:42 PM by Cyndi Stephenson

## 2024-09-03 NOTE — TELEPHONE ENCOUNTER
Please assist with scheduling a wellness appointment - ok for virtual with lab and BP check as ancillary or in person - schedule in September or October.  Wants communication over [employer] per his request on initial message.    ANIRUDH

## 2024-09-26 ENCOUNTER — IMMUNIZATION (OUTPATIENT)
Dept: FAMILY MEDICINE | Facility: CLINIC | Age: 64
End: 2024-09-26
Payer: COMMERCIAL

## 2024-09-26 DIAGNOSIS — Z23 ENCOUNTER FOR IMMUNIZATION: Primary | ICD-10-CM

## 2024-09-26 PROCEDURE — 99207 PR NO CHARGE NURSE ONLY: CPT

## 2024-09-26 PROCEDURE — 91320 SARSCV2 VAC 30MCG TRS-SUC IM: CPT

## 2024-09-26 PROCEDURE — 90480 ADMN SARSCOV2 VAC 1/ONLY CMP: CPT

## 2024-09-26 NOTE — PROGRESS NOTES
Prior to immunization administration, verified patients identity using patient s name and date of birth. Please see Immunization Activity for additional information.     Is the patient's temperature normal (100.5 or less)? Yes 97.7     Patient MEETS CRITERIA. PROCEED with vaccine administration.          9/26/2024   COVID   Have you had myocarditis or pericarditis (inflammation of or around the heart muscle) after getting a COVID-19 vaccine? No   Have you had a serious reaction to a COVID vaccine or something in a COVID vaccine, like polyethylene glycol (PEG) or polysorbate? No   Have you had multisystem inflammatory syndrome from COVID-19 in the past 90 days? No            Patient MEETS CRITERIA. PROCEED with vaccine administration.    Patient instructed to remain in clinic for 15 minutes afterwards, and to report any adverse reactions.      Link to Ancillary Visit Immunization Standing Orders SmartSet     Screening performed by Liseth Charles MA on 9/26/2024 at 10:59 AM.

## 2024-10-03 ENCOUNTER — IMMUNIZATION (OUTPATIENT)
Dept: FAMILY MEDICINE | Facility: CLINIC | Age: 64
End: 2024-10-03
Payer: COMMERCIAL

## 2024-10-03 VITALS — TEMPERATURE: 98.2 F

## 2024-10-03 DIAGNOSIS — Z23 ENCOUNTER FOR IMMUNIZATION: Primary | ICD-10-CM

## 2024-10-03 PROCEDURE — G0008 ADMIN INFLUENZA VIRUS VAC: HCPCS

## 2024-10-03 PROCEDURE — 90662 IIV NO PRSV INCREASED AG IM: CPT

## 2024-10-03 NOTE — PROGRESS NOTES
Prior to immunization administration, verified patients identity using patient s name and date of birth. Please see Immunization Activity for additional information.     Is the patient's temperature normal (100.5 or less)? Yes     Patient MEETS CRITERIA. PROCEED with vaccine administration.      Patient instructed to remain in clinic for 15 minutes afterwards, and to report any adverse reactions.      Link to Ancillary Visit Immunization Standing Orders SmartSet     Screening performed by Shelly Leyva MA on 10/3/2024 at 1:07 PM.

## 2024-11-15 DIAGNOSIS — I10 BENIGN ESSENTIAL HYPERTENSION: ICD-10-CM

## 2024-11-15 RX ORDER — AMLODIPINE BESYLATE 5 MG/1
TABLET ORAL
Qty: 90 TABLET | Refills: 0 | Status: SHIPPED | OUTPATIENT
Start: 2024-11-15

## 2024-11-17 DIAGNOSIS — F41.1 ANXIETY STATE: ICD-10-CM

## 2024-11-17 RX ORDER — ALPRAZOLAM 0.5 MG
0.5 TABLET ORAL DAILY PRN
Qty: 15 TABLET | Refills: 0 | Status: SHIPPED | OUTPATIENT
Start: 2024-11-17

## 2024-11-17 NOTE — TELEPHONE ENCOUNTER
Refill request noted.  PMDP reviewed.  Next visit due  now    Pain contract  n/a        Script with message for follow up appointment need sent.  ANIRUDH

## 2024-11-23 ENCOUNTER — NURSE TRIAGE (OUTPATIENT)
Dept: NURSING | Facility: CLINIC | Age: 64
End: 2024-11-23
Payer: COMMERCIAL

## 2024-11-23 DIAGNOSIS — K21.9 GASTROESOPHAGEAL REFLUX DISEASE WITHOUT ESOPHAGITIS: ICD-10-CM

## 2024-11-23 NOTE — TELEPHONE ENCOUNTER
PCP: Maris Correia Nurse Triage SBAR    Is this a 2nd Level Triage? NO    Situation:   Hemorrhoid flare due to diarrhea episode.    Background:   Pt reports episode of diarrhea irritated pre-existing hemorrhoids.  Hemorrhoid pain is now on two weeks.  Using Preparation H externally.    Assessment:   Pt attributes omeprazole to tendency toward loose stools.  Has increased Metamucil to build bulk.  Sometimes hemorrhoids exhibit mild blood with stools.  \"Sometimes no blood at all.\"  Takes Eliquis prescribed by Daniel Cardiologist.  No severe pain.  No protruding tissue.    Protocol Recommended Disposition:   See PCP Within 3 Days    Recommendation:   Advised sitz bath with detailed instructions and where to obtain a plastic pan for this purpose.  May continue Prep H or try other topical otc analgesics (Anusol, Prep H Rapid Relief with lidocaine).  If any worsening, seek prompt urgent care eval this weekend.  If symptoms persist without improvement, seek clinic appt Monday.  Contact cardiologist who prescribed Eliquis during open clinic hours to report history of hemorrhoids and occasional mild bleeding.  Pt verbalizes understanding.  Agrees to plan.  UC locations and hours confirmed (if needed on weekend).    Elicia Guillermo RN  [facility] [employer] Nurse Advisor     Reason for Disposition   [1] Home treatment > 3 days for rectal pain AND [2] not improved    Additional Information   Negative: Foreign body in rectum   Negative: Diarrhea is main symptom   Negative: Constipation is main symptom (e.g., pain or discomfort caused by passage of hard BMs)   Negative: Blood in or on bowel movement is main symptom   Negative: Pinworms are suspected (rectal itching; (white thread-like worm about size of a staple, moves)   Negative: [1] Mpox suspected (e.g., direct skin contact such as sex, recent travel to [place] or [place]) AND [2] symptoms of Mpox (e.g., rectal rash or sores, fever, muscle aches, or swollen lymph nodes)   Negative: [1] At risk " Deferred for now  Extremities:  for Mpox (men-who-have-sex-with-men) AND [2] possible exposure (e.g., multiple sex partners in past 21 days) AND [3] symptoms of Mpox (e.g., rectal rash or sores, fever, muscle aches, or swollen lymph nodes)   Negative: Sexual assault or rape (sexual intercourse or activity occurs without freely given consent), known or suspected   Negative: Injury to rectum   Negative: Large mass protruding out of rectum   Negative: Patient sounds very sick or weak to the triager   Negative: SEVERE rectal pain (e.g., excruciating, unable to have a bowel movement)   Negative: [1] Rectal pain or redness AND [2] fever   Negative: [1] Sudden onset rectal pain AND [2] constipated (straining, rectal pressure or fullness) AND [3] NOT better after SITZ bath, suppository or enema   Negative: MODERATE-SEVERE rectal pain (i.e., interferes with school, work, or sleep)   Negative: MODERATE-SEVERE rectal itching (i.e., interferes with school, work, or sleep)   Negative: Rash of rectal area (e.g., open sore, painful tiny water blisters, unexplained bumps)   Negative: Rectal area looks infected (e.g., draining sore, spreading redness)   Negative: Last bowel movement (BM) > 4 days ago    Protocols used: Rectal Symptoms-A-AH     

## 2024-11-24 ENCOUNTER — HEALTH MAINTENANCE LETTER (OUTPATIENT)
Age: 64
End: 2024-11-24

## 2024-11-27 ENCOUNTER — MYC MEDICAL ADVICE (OUTPATIENT)
Dept: FAMILY MEDICINE | Facility: CLINIC | Age: 64
End: 2024-11-27
Payer: COMMERCIAL

## 2025-01-13 ENCOUNTER — MYC MEDICAL ADVICE (OUTPATIENT)
Dept: FAMILY MEDICINE | Facility: CLINIC | Age: 65
End: 2025-01-13

## 2025-01-13 DIAGNOSIS — I10 BENIGN ESSENTIAL HYPERTENSION: Primary | ICD-10-CM

## 2025-01-13 DIAGNOSIS — I48.0 PAROXYSMAL ATRIAL FIBRILLATION (H): ICD-10-CM

## 2025-01-13 NOTE — TELEPHONE ENCOUNTER
Routing [employer] message to provider, please review and advise. Edil Lindsay, RN, BSN

## 2025-01-15 RX ORDER — FLECAINIDE ACETATE 100 MG/1
100 TABLET ORAL EVERY 12 HOURS
COMMUNITY
Start: 2025-01-08

## 2025-01-16 RX ORDER — METOPROLOL SUCCINATE 25 MG/1
25 TABLET, EXTENDED RELEASE ORAL DAILY
Qty: 90 TABLET | Refills: 1 | Status: SHIPPED | OUTPATIENT
Start: 2025-01-16

## 2025-01-17 DIAGNOSIS — F41.1 ANXIETY STATE: ICD-10-CM

## 2025-01-20 RX ORDER — HYDROXYZINE HYDROCHLORIDE 25 MG/1
25 TABLET, FILM COATED ORAL 3 TIMES DAILY PRN
Qty: 270 TABLET | Refills: 0 | Status: SHIPPED | OUTPATIENT
Start: 2025-01-20

## 2025-01-22 ENCOUNTER — VIRTUAL VISIT (OUTPATIENT)
Dept: FAMILY MEDICINE | Facility: CLINIC | Age: 65
End: 2025-01-22
Payer: COMMERCIAL

## 2025-01-22 DIAGNOSIS — R39.11 URINARY HESITANCY: ICD-10-CM

## 2025-01-22 DIAGNOSIS — I48.0 PAROXYSMAL ATRIAL FIBRILLATION (H): ICD-10-CM

## 2025-01-22 DIAGNOSIS — F41.1 ANXIETY STATE: Primary | ICD-10-CM

## 2025-01-22 DIAGNOSIS — I10 BENIGN ESSENTIAL HYPERTENSION: ICD-10-CM

## 2025-01-22 PROCEDURE — 98006 SYNCH AUDIO-VIDEO EST MOD 30: CPT | Performed by: FAMILY MEDICINE

## 2025-01-22 RX ORDER — GABAPENTIN 300 MG/1
300 CAPSULE ORAL AT BEDTIME
Qty: 90 CAPSULE | Refills: 1 | Status: SHIPPED | OUTPATIENT
Start: 2025-01-22

## 2025-01-22 RX ORDER — MULTIVITAMIN,THER AND MINERALS
1 TABLET ORAL DAILY
COMMUNITY
Start: 2025-01-09

## 2025-01-22 RX ORDER — GABAPENTIN 300 MG/1
300 CAPSULE ORAL AT BEDTIME
COMMUNITY
Start: 2025-01-08 | End: 2025-01-22

## 2025-01-22 RX ORDER — GABAPENTIN 100 MG/1
100 CAPSULE ORAL 2 TIMES DAILY
COMMUNITY
Start: 2025-01-08 | End: 2025-01-22

## 2025-01-22 RX ORDER — GABAPENTIN 100 MG/1
100 CAPSULE ORAL 2 TIMES DAILY
Qty: 180 CAPSULE | Refills: 1 | Status: SHIPPED | OUTPATIENT
Start: 2025-01-22

## 2025-01-22 NOTE — PROGRESS NOTES
"  If patient has telephone visit, have they been educated on video visit as preferred visit method and offered to change to video visit? NOT APPLICABLE        Instructions Relayed to Patient by Virtual Roomer:     Patient is active on [employer]:   Relayed following to patient: \"It looks like you are active on [employer], are you able to join the visit this way? If not, do you need us to send you a link now or would you like your provider to send a link via text or email when they are ready to initiate the visit?\"      Patient Confirmed they will join visit via: [employer]  Reminded patient to ensure they were logged on to virtual visit by arrival time listed.   Asked if patient has flexibility to initiate visit sooner than arrival time: patient stated yes, documented in appointment notes availability to initiate visit earlier than arrival time     If pediatric virtual visit, ensured pediatric patient along with parent/guardian will be present for video visit.     Patient offered the website [email or web address] and/or phone number to [employer]yn[employer] Help line: [phone]      Gael is a 64 year old who is being evaluated via a billable video visit.    How would you like to obtain your AVS? [employer]  If the video visit is dropped, the invitation should be resent by: Text to cell phone: [phone]  Will anyone else be joining your video visit? No      Assessment & Plan     Anxiety state  Gabapentin has been helpful since discharge from hospital for treatment of anxiety. Taking 100 mg twice a day and 300 mg at night;     Has not been drinking at all since discharge which has been of benefit for mood symptoms as well.     - gabapentin (NEURONTIN) 100 MG capsule; Take 1 capsule (100 mg) by mouth 2 times daily.  - gabapentin (NEURONTIN) 300 MG capsule; Take 1 capsule (300 mg) by mouth at bedtime.    Benign essential hypertension  BP has been under good control - was good at hospital but has not checked since coming " home.  Will check BP and send readings     Paroxysmal atrial fibrillation (H)  Without symptoms since discharge.  Has metoprolol at 25 mg and flecanide as previous.  Planning ablation with cardiology in coming weeks.    Urinary hesitancy  Mild symptoms.  Could consider tamsulosin use if symptoms warrant.  He will reach out if treatment desired.        MED REC REQUIRED  Post Medication Reconciliation Status: discharge medications reconciled, continue medications without change    Patient Instructions   Continue gabapentin as previous - refills updated.     Monitor BP at home and send readings in a few days.    Follow up with cardiology for ablation treatment as planned.    Urinary symptoms are typical for enlarged prostate.  If symptoms become troubling enough to treat, there are medications to address the difficulty starting stream and dribbling events.  Let me know if treatment is desired.      The longitudinal plan of care for the diagnosis(es)/condition(s) as documented were addressed during this visit. Due to the added complexity in care, I will continue to support Gael in the subsequent management and with ongoing continuity of care.            Mirian Mayo is a 64 year old, presenting for the following health issues:  Hospital F/U        1/22/2025     6:44 AM   Additional Questions   Roomed by Flavio   Accompanied by self       Video Start Time:  6:54 AM    [facility]       Hospital Follow-up Visit:    Hospital/Nursing Home/IP Rehab Facility:  [facility]  Date of Admission: 01/07/2025  Date of Discharge: 01/08/2025  Reason(s) for Admission: Fatigue and atrial fibrillation  Was the patient in the ICU or did the patient experience delirium during hospitalization?  No  Do you have any other stressors you would like to discuss with your provider? No    Problems taking medications regularly:  None  Medication changes since discharge: None  Problems adhering to non-medication therapy:   None    Summary of hospitalization:  CareEverywhere information obtained and reviewed  Diagnostic Tests/Treatments reviewed.  Follow up needed: none  Other Healthcare Providers Involved in Patient s Care:         Specialist appointment - saw cardiology for EKG and planning endometrial ablation.    Update since discharge: improved.         Plan of care communicated with patient           Atrial Fibrillation Follow-up    Symptoms: no recent chest pain, significant palpitations, dizziness/lightheadedness, dyspnea, or increased peripheral edema.  Stroke prevention: DOAC (Eliquis, Xarelto, Pradaxa)        6/13/2022     9:10 AM 6/13/2022     9:15 AM 8/22/2022     4:27 PM 8/17/2023     7:05 AM 8/17/2023     7:09 AM   Date   Pulse 97 102 79 63 74     Current [id number] Score: 2 points, which represents a 2.2% annual risk of major embolic event, without anti-coagulation or an LAAO device.   Feeling terrific because of not drinking.   Called [employer] because confused about watch not picking up Afib.  Changed mode to read A fib burden.  Will get a report weekly.  Has cardiology follow up - planning to call to schedule ablation.    Given magnesium.  Taking folic acid.    Sober since hospitalization.  Feeling better with anxiety and energy off ETOH.  Gabapentin 100 mg twice a day and 300 mg at night.      Questions:   More clear headed and better energy.  Fatty liver - diet and alcohol consumption.  Eating well - has fibrosis scan ordered.    Right eye tearing - episodic, no other symptoms.  No vision change, itching, irritation.    Inguinal hernia repair.   Planning ablation first.  Tendency a times for urinary dribbling. Every couple of days - dampness.  Trouble starting flow of urine at times.  Hx of BPH.          Review of Systems  Constitutional, HEENT, cardiovascular, pulmonary, gi and gu systems are negative, except as otherwise noted.      Objective           Vitals:  No vitals were obtained today due to virtual  visit.    Physical Exam   GENERAL: alert and no distress  EYES: Eyes grossly normal to inspection.  No discharge or erythema, or obvious scleral/conjunctival abnormalities.  RESP: No audible wheeze, cough, or visible cyanosis.    SKIN: Visible skin clear. No significant rash, abnormal pigmentation or lesions.  NEURO: Cranial nerves grossly intact.  Mentation and speech appropriate for age.  PSYCH: Appropriate affect, tone, and pace of words          Video-Visit Details    Type of service:  Video Visit   Video End Time:7:30 AM  Originating Location (pt. Location): Home    Distant Location (provider location):  Off-site  Platform used for Video Visit: Wilder  Signed Electronically by: Idania Marroquin MD

## 2025-01-22 NOTE — PATIENT INSTRUCTIONS
Continue gabapentin as previous - refills updated.     Monitor BP at home and send readings in a few days.    Follow up with cardiology for ablation treatment as planned.    Urinary symptoms are typical for enlarged prostate.  If symptoms become troubling enough to treat, there are medications to address the difficulty starting stream and dribbling events.  Let me know if treatment is desired.

## 2025-02-12 DIAGNOSIS — I10 BENIGN ESSENTIAL HYPERTENSION: ICD-10-CM

## 2025-02-12 RX ORDER — AMLODIPINE BESYLATE 5 MG/1
TABLET ORAL
Qty: 90 TABLET | Refills: 0 | Status: SHIPPED | OUTPATIENT
Start: 2025-02-12

## 2025-02-13 ENCOUNTER — PATIENT OUTREACH (OUTPATIENT)
Dept: CARE COORDINATION | Facility: CLINIC | Age: 65
End: 2025-02-13
Payer: COMMERCIAL

## 2025-03-27 ENCOUNTER — OFFICE VISIT (OUTPATIENT)
Dept: GASTROENTEROLOGY | Facility: CLINIC | Age: 65
End: 2025-03-27
Payer: COMMERCIAL

## 2025-03-27 VITALS
SYSTOLIC BLOOD PRESSURE: 159 MMHG | WEIGHT: 185.8 LBS | DIASTOLIC BLOOD PRESSURE: 80 MMHG | HEART RATE: 66 BPM | BODY MASS INDEX: 27.52 KG/M2 | HEIGHT: 69 IN | OXYGEN SATURATION: 98 %

## 2025-03-27 DIAGNOSIS — F10.90 ALCOHOL USE DISORDER: ICD-10-CM

## 2025-03-27 DIAGNOSIS — K76.0 HEPATIC STEATOSIS: ICD-10-CM

## 2025-03-27 DIAGNOSIS — I48.0 PAROXYSMAL ATRIAL FIBRILLATION (H): ICD-10-CM

## 2025-03-27 DIAGNOSIS — R79.89 LFTS ABNORMAL: Primary | ICD-10-CM

## 2025-03-27 DIAGNOSIS — R74.01 ELEVATED SGOT: ICD-10-CM

## 2025-03-27 PROBLEM — K60.2 ANAL FISSURE: Status: ACTIVE | Noted: 2025-01-08

## 2025-03-27 PROBLEM — E83.42 HYPOMAGNESEMIA: Status: ACTIVE | Noted: 2025-01-08

## 2025-03-27 LAB
ALBUMIN SERPL BCG-MCNC: 4.5 G/DL (ref 3.5–5.2)
ALP SERPL-CCNC: 98 U/L (ref 40–150)
ALT SERPL W P-5'-P-CCNC: 32 U/L (ref 0–70)
ANION GAP SERPL CALCULATED.3IONS-SCNC: 13 MMOL/L (ref 7–15)
AST SERPL W P-5'-P-CCNC: 32 U/L (ref 0–45)
BILIRUB DIRECT SERPL-MCNC: 0.28 MG/DL (ref 0–0.3)
BILIRUB SERPL-MCNC: 0.6 MG/DL
BUN SERPL-MCNC: 10.7 MG/DL (ref 8–23)
CALCIUM SERPL-MCNC: 10.3 MG/DL (ref 8.8–10.4)
CHLORIDE SERPL-SCNC: 104 MMOL/L (ref 98–107)
CREAT SERPL-MCNC: 0.78 MG/DL (ref 0.67–1.17)
EGFRCR SERPLBLD CKD-EPI 2021: >90 ML/MIN/1.73M2
ERYTHROCYTE [DISTWIDTH] IN BLOOD BY AUTOMATED COUNT: 12.3 % (ref 10–15)
GLUCOSE SERPL-MCNC: 112 MG/DL (ref 70–99)
HCO3 SERPL-SCNC: 27 MMOL/L (ref 22–29)
HCT VFR BLD AUTO: 45.5 % (ref 40–53)
HGB BLD-MCNC: 15.6 G/DL (ref 13.3–17.7)
INR PPP: 0.97 (ref 0.85–1.15)
MCH RBC QN AUTO: 33.3 PG (ref 26.5–33)
MCHC RBC AUTO-ENTMCNC: 34.3 G/DL (ref 31.5–36.5)
MCV RBC AUTO: 97 FL (ref 78–100)
PLATELET # BLD AUTO: 224 10E3/UL (ref 150–450)
POTASSIUM SERPL-SCNC: 4.2 MMOL/L (ref 3.4–5.3)
PROT SERPL-MCNC: 7.7 G/DL (ref 6.4–8.3)
RBC # BLD AUTO: 4.68 10E6/UL (ref 4.4–5.9)
SODIUM SERPL-SCNC: 144 MMOL/L (ref 135–145)
WBC # BLD AUTO: 7.2 10E3/UL (ref 4–11)

## 2025-03-27 RX ORDER — TERBUTALINE SULFATE 5 MG/1
TABLET ORAL
COMMUNITY
Start: 2024-07-19

## 2025-03-27 RX ORDER — FUROSEMIDE 20 MG/1
20 TABLET ORAL
COMMUNITY
Start: 2025-03-11

## 2025-03-27 ASSESSMENT — PAIN SCALES - GENERAL: PAINLEVEL_OUTOF10: NO PAIN (0)

## 2025-03-27 NOTE — PROGRESS NOTES
Hepatology Clinic Note  Gael Le   Date of Birth 1960    REASON FOR FOLLOW UP: Hepatic steatosis, elevated AST, alcohol use disorder           Assessment/plan:     63 YO M with PMHx significant for GERD, nontoxic multinodular goiter, hypercholesterolemia, paroxysmal A-fib on Eliquis, essential HTN and anxiety who presents for follow-up regarding elevated LFTs, alcohol use disorder and hepatic steatosis.  He was last seen in clinic by myself for consult 4/25/24.      No hx of liver biopsy.  Denies known family history of liver cancer or liver disease.     Concerns of hepatic steatosis on imaging              > Suspect both alcohol and metabolic related   Elevated LFTs (recently much more elevated during recent hospital admission March 2025; likely 2/2 alcohol use vs steatohepatitis vs less likely other)  Coagulopathy (likely driven by the fact that patient is on Eliquis)  Recent daily alcohol use    > Last use 3/9/25; before this date, was consuming 6-7 6 oz glasses of wine per day              > Hx of heavier alcohol use in his 20s; admits he was sober from 1991 until 2016; prior to pandemic starting, was typically drinking 1-2 drinks per week; use increased when COVID started              > No hx of DUI or legal ramifications 2/2 alcohol use              > No hx of completing chemical dependency treatment/program     Abd aortic screening US 2/13/24: Increased hepatic echogenicity and echotexture. Liver incompletely evaluated. Differential includes cirrhosis, hepatic steatosis, and hepatitis. Liver lesions cannot be excluded. If clinically indicated, further eval with abdominal US or liver MR could be performed.     US abd limited 2/15/24: liver w/ craniocaudal dimension of 16.0 cm. No focal liver lesion. Marked diffuse increased echogenicity of liver parenchyma with significant attenuation of US beam. Gallbladder normal. No gallbladder wall thickening or pericholecystic fluid. No sonographic Jacinto's  sign. CBD 3mm     FIB-4: 3.06 (AST: 47, ALT: 35, Platelets: 169, Age: 63)     On 3/10/2025 with outside lab work, , AST 42.  Alk phos, T. bili, albumin, PLT were all within normal limits.  On 3/11/2025 ALT was 92 and AST was 150.  Reassuring that they trended down even within 1 day.    No ongoing laboratory or clinical evidence to support F3/F4 fibrosis or cirrhosis.     PLAN:  -Reviewed recent labs and imaging  -MELD labs today   > Hope to see LFTs within normal limits or close to; if not, will consider possible additional exclusionary liver lab workup  -Discussed alcohol use and congratulated pt on recent sobriety; recommended abstaining from alcohol use at this time instructed him to let me know if he is struggling with this; he verbalized understanding and agreed   > Moving forward, consider referral to addiction medicine  -Previously ordered fibroscan   > Will message pathology scheduling staff to reach patient to get this scheduled  - Re-discussed pathophysiology and natural hx of nonalcoholic fatty liver disease   - Recommend slow gradual weight loss. Discussed how a weight loss of 3-5% can show improvement on steatosis and weight loss of 7-10% can show improvement on fibrosis on histology.   - Maintain good control of cholesterol (pt is on statin)   - Optimize blood glucose as needed. Could consider GLP-1 inhibitor for both insulin resistance and help with weight loss  - limit carbs, especially simple carbs, and follow Mediterranean eating patten  - Increase physical activity as able, ideally 150 minutes weekly  - Follow with PCP as recommended    Follow up with me in clinic with FibroScan same day as soon as possible    Ani Enamorado PA-C  [facility]   -----------------------------------------------------         HPI:       Patient presents to clinic for follow-up with his wife.  He did not have blood work drawn today.    Patient states he was never able to complete  "FibroScan as recommended.  He is wanting to get this scheduled and completed.    Patient states he recently had a 1 night hospital admission through an outside hospital for concerns regarding A-fib that was uncontrollable.  Patient states he ended up having a cardiac ablation.  He spent 1 night in the hospital and then was discharged home.  Patient states he follows with his cardiologist through [facility].  He reports flecainide is a relatively new medication.  He continues to take Eliquis as prescribed.      Patient tells me his last use of alcohol was the day before he was admitted to the hospital, March 9.  Patient states before that he was drinking 6-7 6 ounce glasses of wine per day.  Patient states he did not go through withdrawal symptoms.  He denies any current urges or cravings for alcohol use.  He has never completed any form of chemical dependency treatment program.  Patient states he has tried to complete CD treatment in the past but reports that work always gotten the way.  Patient states his wife also stopped consuming alcohol.  He feels as if he has good social support at this time.  Also recently resumed some spirituality practices.  Feels as if triggers for his alcohol use were high stress and COVID.  When asked what his goal is in terms of alcohol, patient stated that he hopes to, resume \"pre-COVID use\" where he would drink occasionally in moderation.  He does verbalize that he is okay to continue abstaining at this time.  Denies tobacco and illicit drug use.    He denies any recent fevers, chills, nausea, vomiting or abdominal pain.  No yellowing of his eyes or skin.  Denies confusion.  Denies melena.  Typically passing stool 4 times daily.  Does occasionally notes small amounts of bright red blood per rectum which he attributes to his history of hemorrhoids.  Patient states he has been doing Metamucil twice daily as of late which has significantly helped.  No pain with defecation as of late.  He " "occasionally notes mild lower extremity edema.  Will note a sock line after removing socks.  Denies chest pain or shortness of breath.  Reports appetite is good.  Since he has been abstaining from alcohol, has noted a decrease in weight of 3-4 LBS.     Previous work-up:   Lab Results   Component Value Date    HEPBANG Nonreactive 04/25/2024    HBCAB Nonreactive 04/25/2024    AUSAB 13.80 04/25/2024    HCVAB  10/13/2016     Nonreactive   Assay performance characteristics have not been established for newborns,   infants, and children      TSH 3.50 10/07/2022    CHOL 173 02/15/2024    HDL 58 02/15/2024    LDL 92 02/15/2024    TRIG 114 02/15/2024    A1C 5.2 04/25/2024      No results found for: \"SPECDES\", \"LDRESULTS\"    PMH:    has a past medical history of Arthritis, Hypertension, Nontoxic multinodular goiter, Paroxysmal atrial fibrillation (H) (11/04/2021), and Supraventricular premature beats.     SMH:    has a past surgical history that includes hernia repair; Finger surgery; Colonoscopy with CO2 insufflation (N/A, 11/21/2016); Abdomen surgery (1968); biopsy (1993); colonoscopy (11/21/2016); orthopedic surgery (1993); and Colonoscopy with CO2 insufflation (N/A, 6/13/2022).     Medications:   Current Outpatient Medications   Medication Sig Dispense Refill    furosemide (LASIX) 20 MG tablet Take 20 mg by mouth.      lidocaine (XYLOCAINE) 2 % external gel Apply a small amount to the affected area up to 4 times daily as needed for pain      nifedipine 0.2% in white petrolatum 0.2 % OINT ointment Apply topically.      terbutaline (BRETHINE) 5 MG tablet At 3 hrs of erection , take 1 tab. At 3 1/2 hrs, take an additional tab. At 4 hrs go to ER.      ALPRAZolam (XANAX) 0.5 MG tablet Take 1 tablet (0.5 mg) by mouth daily as needed for anxiety. +++ appointment needed for additional refills +++ 15 tablet 0    amLODIPine (NORVASC) 5 MG tablet TAKE 1 TABLET BY MOUTH EVERY DAY 90 tablet 0    apixaban ANTICOAGULANT (ELIQUIS) 5 MG " tablet Take 5 mg by mouth 2 times daily      atorvastatin (LIPITOR) 40 MG tablet Take 40 mg by mouth at bedtime      flecainide (TAMBOCOR) 100 MG tablet Take 100 mg by mouth every 12 hours.      folic acid (FOLVITE) 1 MG tablet Take 1 tablet (1 mg) by mouth daily. 100 tablet 3    gabapentin (NEURONTIN) 100 MG capsule Take 1 capsule (100 mg) by mouth 2 times daily. 180 capsule 1    gabapentin (NEURONTIN) 300 MG capsule Take 1 capsule (300 mg) by mouth at bedtime. 90 capsule 1    loratadine (CLARITIN) 10 MG tablet Take 10 mg by mouth daily      losartan-hydrochlorothiazide (HYZAAR) 50-12.5 MG tablet Take 1 tablet by mouth daily      metoprolol succinate ER (TOPROL XL) 25 MG 24 hr tablet Take 1 tablet (25 mg) by mouth daily. 90 tablet 1    Multiple Vitamins-Minerals (SUPER THERA BEATRICE M) TABS Take 1 tablet by mouth daily.      mupirocin (BACTROBAN) 2 % external ointment Apply topically 3 times daily 30 g 0    omeprazole (PRILOSEC) 20 MG DR capsule TAKE 1 CAPSULE (20 MG) BY MOUTH DAILY 30-60 MIN PRIOR TO A MEAL 90 capsule 1    tadalafil (CIALIS) 5 MG tablet TAKE 1 TABLET BY MOUTH EVERY DAY 30 tablet 1     No current facility-administered medications for this visit.     Recent Labs   Lab Test 04/25/24  0932 02/15/24  1022 10/07/22  0939 05/13/21  0830 10/15/20  0949 06/11/19  0922 05/02/18  0755 09/01/17  0837   ALKPHOS 89 95 73 74 88 78 74  --    ALT 35 35 41 55 58 36 40 45   AST 47* 49* 32 39 41 27 28  --    BILITOTAL 0.9 0.7 1.1 1.0 1.0 1.1 1.0  --            Allergies:     Allergies   Allergen Reactions    Lisinopril-Hydrochlorothiazide Cough          Social History:     Social History     Socioeconomic History    Marital status:      Spouse name: Not on file    Number of children: Not on file    Years of education: Not on file    Highest education level: Not on file   Occupational History    Not on file   Tobacco Use    Smoking status: Never     Passive exposure: Never    Smokeless tobacco: Never   Vaping Use     Vaping status: Never Used   Substance and Sexual Activity    Alcohol use: Yes     Comment: last use 3/9/25; prior use 6-7 6oz glasses of wine    Drug use: Not Currently     Comment: no hx IVDU    Sexual activity: Yes     Partners: Female     Birth control/protection: Female Surgical     Comment: Cialis, Viagra   Other Topics Concern    Parent/sibling w/ CABG, MI or angioplasty before 65F 55M? No   Social History Narrative    Not on file     Social Drivers of Health     Financial Resource Strain: Low Risk  (1/8/2025)    Received from [facility]    Financial Resource Strain     Difficulty of Paying Living Expenses: 3     Difficulty of Paying Living Expenses: Not on file   Food Insecurity: No Food Insecurity (1/8/2025)    Received from [facility]    Food Insecurity     Do you worry your food will run out before you are able to buy more?: 1   Transportation Needs: No Transportation Needs (1/8/2025)    Received from [facility]    Transportation Needs     Does lack of transportation keep you from medical appointments?: 1     Does lack of transportation keep you from work, meetings or getting things that you need?: 1   Physical Activity: Not on file   Stress: Not on file   Social Connections: Socially Integrated (1/8/2025)    Received from [facility]    Social Connections     Do you often feel lonely or isolated from those around you?: 0   Interpersonal Safety: Not on file   Housing Stability: Low Risk  (1/8/2025)    Received from [facility]    Housing Stability     What is your housing situation today?: 1          Family History:     Family History   Problem Relation Age of Onset    Dementia Mother         multiinfarct dementia    Hypertension Mother     Hyperlipidemia Mother     Cerebrovascular Disease Mother         MID    Depression Mother          "Severe    Anxiety Disorder Mother     Diabetes Father         Onset late in life.    Cerebrovascular Disease Father         Left him quadriplegic for 2 yrs    Skin Cancer Father         BCC    Other Cancer Father         minor skin (not treated)    Obesity Father     Myocardial Infarction Paternal Grandmother     Esophageal Cancer Half-Brother     Colon Cancer No family hx of     Prostate Cancer No family hx of     Asthma No family hx of     Liver Disease No family hx of     Liver Cancer No family hx of           Review of Systems:   Gen: See HPI            Physical Exam:   Vital signs:      BP: (!) 159/80 Pulse: 66     SpO2: 98 %     Height: 175.3 cm (5' 9\") Weight: 84.3 kg (185 lb 12.8 oz)  Estimated body mass index is 27.44 kg/m  as calculated from the following:    Height as of this encounter: 1.753 m (5' 9\").    Weight as of this encounter: 84.3 kg (185 lb 12.8 oz).  Gen: A&Ox3, NAD  HEENT: Sclera anicteric  Lung: non-labored breathing   Ext: no edema, intact pulses.   Skin: No jaundice  Neuro: grossly intact  Psych: appropriate mood and affects         Data:   Reviewed in person and significant for:    Lab Results   Component Value Date     04/25/2024     05/13/2021      Lab Results   Component Value Date    POTASSIUM 4.2 04/25/2024    POTASSIUM 3.8 10/07/2022    POTASSIUM 3.8 05/13/2021     Lab Results   Component Value Date    CHLORIDE 102 04/25/2024    CHLORIDE 105 10/07/2022    CHLORIDE 109 05/13/2021     Lab Results   Component Value Date    CO2 25 04/25/2024    CO2 28 10/07/2022    CO2 26 05/13/2021     Lab Results   Component Value Date    BUN 12.6 04/25/2024    BUN 16 10/07/2022    BUN 14 05/13/2021     Lab Results   Component Value Date    CR 0.76 04/25/2024    CR 0.82 05/13/2021       Lab Results   Component Value Date    WBC 6.9 04/25/2024    WBC 6.2 05/13/2021     Lab Results   Component Value Date    HGB 16.4 04/25/2024    HGB 14.6 05/13/2021     Lab Results   Component Value Date    " "HCT 46.5 04/25/2024    HCT 42.2 05/13/2021     Lab Results   Component Value Date    MCV 94 04/25/2024    MCV 93 05/13/2021     Lab Results   Component Value Date     04/25/2024     05/13/2021     Lab Results   Component Value Date    AST 47 04/25/2024    AST 39 05/13/2021     Lab Results   Component Value Date    ALT 35 04/25/2024    ALT 55 05/13/2021     No results found for: \"BILICONJ\"   Lab Results   Component Value Date    BILITOTAL 0.9 04/25/2024    BILITOTAL 1.0 05/13/2021     Lab Results   Component Value Date    ALBUMIN 4.5 04/25/2024    ALBUMIN 3.7 10/07/2022    ALBUMIN 3.8 05/13/2021     Lab Results   Component Value Date    PROTTOTAL 7.6 04/25/2024    PROTTOTAL 7.0 05/13/2021      Lab Results   Component Value Date    ALKPHOS 89 04/25/2024    ALKPHOS 74 05/13/2021       Lab Results   Component Value Date    INR 1.20 04/25/2024       Imaging:        Right upper quadrant ultrasound 2/15/24     Comparisons: Aortic ultrasound 2/13/2024     HISTORY: Increased liver echogenicity on prior ultrasound     FINDINGS: The liver measures a craniocaudal dimension of 16.0 cm. No focal liver lesion. There is marked diffuse increased echogenicity of the liver parenchyma with significant attenuation of the ultrasound beam. The gallbladder is normal. There is no gallbladder wall thickening or pericholecystic fluid. No sonographic Jacinto's sign was elicited.  The diameter of the common bile duct measures 3mm. The pancreas is partially obscured but otherwise appears unremarkable. The aorta and IVC are visualized. The right kidney measures 11.8 cm. No solid renal mass, stone or hydronephrosis.                                                                   IMPRESSION:    Diffuse intrinsic hepatic parenchymal disease in a  pattern most compatible with severe diffuse fatty infiltration.     GIANNA MONTANA MD               ABDOMINAL AORTIC SCREENING ULTRASOUND 2/13/2024 8:07 AM     CLINICAL HISTORY: " Encounter for abdominal aortic aneurysm (AAA)  screening. Screening abdominal aortic ultrasound requested.     COMPARISONS: None available.     ORDERING PROVIDER: CARLOTTA WILKES     TECHNIQUE: Grayscale images were obtained through the aorta and common  iliac arteries. Diameters were measured in the longitudinal and  transverse planes.      FINDINGS: Artery diameters (longitudinal / transverse):     Aorta:       Proximal: 2.8 cm /  2.8 x 2.6 cm       Mid: 2.2 cm /  2.0 x 2.0 cm       Distal: 1.6 cm /  1.7 x 2.0 cm     Right common iliac artery: 1.4 cm /  1.4 cm     Left common iliac artery: 1.3 cm /  1.4 cm     Liver is increased in echogenicity and echotexture but incompletely  evaluated in this study.     Bladder outpouchings incompletely demonstrated in this study.                                                                      IMPRESSION:  1. Ascending aorta measures up to 2.8 cm in diameter.     2. Increased hepatic echogenicity and echotexture. Liver incompletely  evaluated in this study. Differential includes cirrhosis, hepatic  steatosis, and hepatitis. Liver lesions cannot be excluded. If  clinically indicated, further evaluation with abdominal ultrasound or  liver MR could be performed.     3. Bladder outpouchings suggested but incompletely evaluated in this  study. Possible ureteroceles or diverticula. If clinically indicated,  further evaluation with cystoscopy or CT urogram/cystogram.     Aorta diameter measurement classification:  < 2.5 cm: Normal  2.5 - 2.9 cm: Ectatic  > 3 cm: Aneurysmal     Iliac artery:  Males: > or = 1.7 cm: Ectatic  Females: > or = 1.5 cm: Ectatic  > 2.5 cm: Aneurysmal     Nanette F, Sruthi G, Vladislav M et-al. Managing incidental findings on  abdominal and pelvic CT and MRI, Part 2: white paper of the ACR  Incidental Findings Committee II on vascular findings. J Am Radha  Radiol. 2013;10 (10): 789-94. doi:[email or web address]      DIEUDONNE CHAPIN MD

## 2025-03-27 NOTE — NURSING NOTE
"Chief Complaint   Patient presents with    Follow Up     Follow-up for hepatic steatosis     He requests these members of his care team be copied on today's visit information:  PCP: Idania Marroquin    Vitals:    03/27/25 1542   BP: (!) 159/80   BP Location: Left arm   Patient Position: Sitting   Cuff Size: Adult Regular   Pulse: 66   SpO2: 98%   Weight: 84.3 kg (185 lb 12.8 oz)   Height: 1.753 m (5' 9\")     Body mass index is 27.44 kg/m .    Medications were reconciled.    Does patient need any medication refills at today's visit? No        Eden Garrett CMA        "

## 2025-03-27 NOTE — LETTER
3/27/2025      Gael Le  [address]  [facility] [address]      Dear Colleague,    Thank you for referring your patient, Gael Le, to the [facility]. Please see a copy of my visit note below.    Hepatology Clinic Note  Gael Le   Date of Birth 1960    REASON FOR FOLLOW UP: Hepatic steatosis, elevated AST, alcohol use disorder           Assessment/plan:     65 YO M with PMHx significant for GERD, nontoxic multinodular goiter, hypercholesterolemia, paroxysmal A-fib on Eliquis, essential HTN and anxiety who presents for follow-up regarding elevated LFTs, alcohol use disorder and hepatic steatosis.  He was last seen in clinic by myself for consult 4/25/24.      No hx of liver biopsy.  Denies known family history of liver cancer or liver disease.     Concerns of hepatic steatosis on imaging              > Suspect both alcohol and metabolic related   Elevated LFTs (recently much more elevated during recent hospital admission March 2025; likely 2/2 alcohol use vs steatohepatitis vs less likely other)  Coagulopathy (likely driven by the fact that patient is on Eliquis)  Recent daily alcohol use    > Last use 3/9/25; before this date, was consuming 6-7 6 oz glasses of wine per day              > Hx of heavier alcohol use in his 20s; admits he was sober from 1991 until 2016; prior to pandemic starting, was typically drinking 1-2 drinks per week; use increased when COVID started              > No hx of DUI or legal ramifications 2/2 alcohol use              > No hx of completing chemical dependency treatment/program     Abd aortic screening US 2/13/24: Increased hepatic echogenicity and echotexture. Liver incompletely evaluated. Differential includes cirrhosis, hepatic steatosis, and hepatitis. Liver lesions cannot be excluded. If clinically indicated, further eval with abdominal US or liver MR could be performed.     US abd limited 2/15/24: liver w/ craniocaudal  dimension of 16.0 cm. No focal liver lesion. Marked diffuse increased echogenicity of liver parenchyma with significant attenuation of US beam. Gallbladder normal. No gallbladder wall thickening or pericholecystic fluid. No sonographic Jacinto's sign. CBD 3mm     FIB-4: 3.06 (AST: 47, ALT: 35, Platelets: 169, Age: 63)     On 3/10/2025 with outside lab work, , AST 42.  Alk phos, T. bili, albumin, PLT were all within normal limits.  On 3/11/2025 ALT was 92 and AST was 150.  Reassuring that they trended down even within 1 day.    No ongoing laboratory or clinical evidence to support F3/F4 fibrosis or cirrhosis.     PLAN:  -Reviewed recent labs and imaging  -MELD labs today   > Hope to see LFTs within normal limits or close to; if not, will consider possible additional exclusionary liver lab workup  -Discussed alcohol use and congratulated pt on recent sobriety; recommended abstaining from alcohol use at this time instructed him to let me know if he is struggling with this; he verbalized understanding and agreed   > Moving forward, consider referral to addiction medicine  -Previously ordered fibroscan   > Will message pathology scheduling staff to reach patient to get this scheduled  - Re-discussed pathophysiology and natural hx of nonalcoholic fatty liver disease   - Recommend slow gradual weight loss. Discussed how a weight loss of 3-5% can show improvement on steatosis and weight loss of 7-10% can show improvement on fibrosis on histology.   - Maintain good control of cholesterol (pt is on statin)   - Optimize blood glucose as needed. Could consider GLP-1 inhibitor for both insulin resistance and help with weight loss  - limit carbs, especially simple carbs, and follow Mediterranean eating patten  - Increase physical activity as able, ideally 150 minutes weekly  - Follow with PCP as recommended    Follow up with me in clinic with FibroScan same day as soon as possible    Ani Enamorado PA-C  [facility]  "[place] [facility]   -----------------------------------------------------         HPI:       Patient presents to clinic for follow-up with his wife.  He did not have blood work drawn today.    Patient states he was never able to complete FibroScan as recommended.  He is wanting to get this scheduled and completed.    Patient states he recently had a 1 night hospital admission through an outside hospital for concerns regarding A-fib that was uncontrollable.  Patient states he ended up having a cardiac ablation.  He spent 1 night in the hospital and then was discharged home.  Patient states he follows with his cardiologist through [facility].  He reports flecainide is a relatively new medication.  He continues to take Eliquis as prescribed.      Patient tells me his last use of alcohol was the day before he was admitted to the hospital, March 9.  Patient states before that he was drinking 6-7 6 ounce glasses of wine per day.  Patient states he did not go through withdrawal symptoms.  He denies any current urges or cravings for alcohol use.  He has never completed any form of chemical dependency treatment program.  Patient states he has tried to complete CD treatment in the past but reports that work always gotten the way.  Patient states his wife also stopped consuming alcohol.  He feels as if he has good social support at this time.  Also recently resumed some spirituality practices.  Feels as if triggers for his alcohol use were high stress and COVID.  When asked what his goal is in terms of alcohol, patient stated that he hopes to, resume \"pre-COVID use\" where he would drink occasionally in moderation.  He does verbalize that he is okay to continue abstaining at this time.  Denies tobacco and illicit drug use.    He denies any recent fevers, chills, nausea, vomiting or abdominal pain.  No yellowing of his eyes or skin.  Denies confusion.  Denies melena.  Typically passing stool 4 times daily.  Does occasionally " "notes small amounts of bright red blood per rectum which he attributes to his history of hemorrhoids.  Patient states he has been doing Metamucil twice daily as of late which has significantly helped.  No pain with defecation as of late.  He occasionally notes mild lower extremity edema.  Will note a sock line after removing socks.  Denies chest pain or shortness of breath.  Reports appetite is good.  Since he has been abstaining from alcohol, has noted a decrease in weight of 3-4 LBS.     Previous work-up:   Lab Results   Component Value Date    HEPBANG Nonreactive 04/25/2024    HBCAB Nonreactive 04/25/2024    AUSAB 13.80 04/25/2024    HCVAB  10/13/2016     Nonreactive   Assay performance characteristics have not been established for newborns,   infants, and children      TSH 3.50 10/07/2022    CHOL 173 02/15/2024    HDL 58 02/15/2024    LDL 92 02/15/2024    TRIG 114 02/15/2024    A1C 5.2 04/25/2024      No results found for: \"SPECDES\", \"LDRESULTS\"    PMH:    has a past medical history of Arthritis, Hypertension, Nontoxic multinodular goiter, Paroxysmal atrial fibrillation (H) (11/04/2021), and Supraventricular premature beats.     SMH:    has a past surgical history that includes hernia repair; Finger surgery; Colonoscopy with CO2 insufflation (N/A, 11/21/2016); Abdomen surgery (1968); biopsy (1993); colonoscopy (11/21/2016); orthopedic surgery (1993); and Colonoscopy with CO2 insufflation (N/A, 6/13/2022).     Medications:   Current Outpatient Medications   Medication Sig Dispense Refill     furosemide (LASIX) 20 MG tablet Take 20 mg by mouth.       lidocaine (XYLOCAINE) 2 % external gel Apply a small amount to the affected area up to 4 times daily as needed for pain       nifedipine 0.2% in white petrolatum 0.2 % OINT ointment Apply topically.       terbutaline (BRETHINE) 5 MG tablet At 3 hrs of erection , take 1 tab. At 3 1/2 hrs, take an additional tab. At 4 hrs go to ER.       ALPRAZolam (XANAX) 0.5 MG tablet " Take 1 tablet (0.5 mg) by mouth daily as needed for anxiety. +++ appointment needed for additional refills +++ 15 tablet 0     amLODIPine (NORVASC) 5 MG tablet TAKE 1 TABLET BY MOUTH EVERY DAY 90 tablet 0     apixaban ANTICOAGULANT (ELIQUIS) 5 MG tablet Take 5 mg by mouth 2 times daily       atorvastatin (LIPITOR) 40 MG tablet Take 40 mg by mouth at bedtime       flecainide (TAMBOCOR) 100 MG tablet Take 100 mg by mouth every 12 hours.       folic acid (FOLVITE) 1 MG tablet Take 1 tablet (1 mg) by mouth daily. 100 tablet 3     gabapentin (NEURONTIN) 100 MG capsule Take 1 capsule (100 mg) by mouth 2 times daily. 180 capsule 1     gabapentin (NEURONTIN) 300 MG capsule Take 1 capsule (300 mg) by mouth at bedtime. 90 capsule 1     loratadine (CLARITIN) 10 MG tablet Take 10 mg by mouth daily       losartan-hydrochlorothiazide (HYZAAR) 50-12.5 MG tablet Take 1 tablet by mouth daily       metoprolol succinate ER (TOPROL XL) 25 MG 24 hr tablet Take 1 tablet (25 mg) by mouth daily. 90 tablet 1     Multiple Vitamins-Minerals (SUPER THERA BEATRICE M) TABS Take 1 tablet by mouth daily.       mupirocin (BACTROBAN) 2 % external ointment Apply topically 3 times daily 30 g 0     omeprazole (PRILOSEC) 20 MG DR capsule TAKE 1 CAPSULE (20 MG) BY MOUTH DAILY 30-60 MIN PRIOR TO A MEAL 90 capsule 1     tadalafil (CIALIS) 5 MG tablet TAKE 1 TABLET BY MOUTH EVERY DAY 30 tablet 1     No current facility-administered medications for this visit.     Recent Labs   Lab Test 04/25/24  0932 02/15/24  1022 10/07/22  0939 05/13/21  0830 10/15/20  0949 06/11/19  0922 05/02/18  0755 09/01/17  0837   ALKPHOS 89 95 73 74 88 78 74  --    ALT 35 35 41 55 58 36 40 45   AST 47* 49* 32 39 41 27 28  --    BILITOTAL 0.9 0.7 1.1 1.0 1.0 1.1 1.0  --            Allergies:     Allergies   Allergen Reactions     Lisinopril-Hydrochlorothiazide Cough          Social History:     Social History     Socioeconomic History     Marital status:      Spouse name: Not on  file     Number of children: Not on file     Years of education: Not on file     Highest education level: Not on file   Occupational History     Not on file   Tobacco Use     Smoking status: Never     Passive exposure: Never     Smokeless tobacco: Never   Vaping Use     Vaping status: Never Used   Substance and Sexual Activity     Alcohol use: Yes     Comment: last use 3/9/25; prior use 6-7 6oz glasses of wine     Drug use: Not Currently     Comment: no hx IVDU     Sexual activity: Yes     Partners: Female     Birth control/protection: Female Surgical     Comment: Cialis, Viagra   Other Topics Concern     Parent/sibling w/ CABG, MI or angioplasty before 65F 55M? No   Social History Narrative     Not on file     Social Drivers of Health     Financial Resource Strain: Low Risk  (1/8/2025)    [facility]    Financial Resource Strain      Difficulty of Paying Living Expenses: 3      Difficulty of Paying Living Expenses: Not on file   Food Insecurity: No Food Insecurity (1/8/2025)    Received from [facility] [employer]    Food Insecurity      Do you worry your food will run out before you are able to buy more?: 1   Transportation Needs: No Transportation Needs (1/8/2025)    Received from [employer]    Transportation Needs      Does lack of transportation keep you from medical appointments?: 1      Does lack of transportation keep you from work, meetings or getting things that you need?: 1   Physical Activity: Not on file   Stress: Not on file   Social Connections: Socially Integrated (1/8/2025)    Received from [employer]    Social Connections      Do you often feel lonely or isolated from those around you?: 0   Interpersonal Safety: Not on file   Housing Stability: Low Risk  (1/8/2025)    Received from [employer]    Housing Stability      What is your  "housing situation today?: 1          Family History:     Family History   Problem Relation Age of Onset     Dementia Mother         multiinfarct dementia     Hypertension Mother      Hyperlipidemia Mother      Cerebrovascular Disease Mother         MID     Depression Mother         Severe     Anxiety Disorder Mother      Diabetes Father         Onset late in life.     Cerebrovascular Disease Father         Left him quadriplegic for 2 yrs     Skin Cancer Father         BCC     Other Cancer Father         minor skin (not treated)     Obesity Father      Myocardial Infarction Paternal Grandmother      Esophageal Cancer Half-Brother      Colon Cancer No family hx of      Prostate Cancer No family hx of      Asthma No family hx of      Liver Disease No family hx of      Liver Cancer No family hx of           Review of Systems:   Gen: See HPI            Physical Exam:   Vital signs:      BP: (!) 159/80 Pulse: 66     SpO2: 98 %     Height: 175.3 cm (5' 9\") Weight: 84.3 kg (185 lb 12.8 oz)  Estimated body mass index is 27.44 kg/m  as calculated from the following:    Height as of this encounter: 1.753 m (5' 9\").    Weight as of this encounter: 84.3 kg (185 lb 12.8 oz).  Gen: A&Ox3, NAD  HEENT: Sclera anicteric  Lung: non-labored breathing   Ext: no edema, intact pulses.   Skin: No jaundice  Neuro: grossly intact  Psych: appropriate mood and affects         Data:   Reviewed in person and significant for:    Lab Results   Component Value Date     04/25/2024     05/13/2021      Lab Results   Component Value Date    POTASSIUM 4.2 04/25/2024    POTASSIUM 3.8 10/07/2022    POTASSIUM 3.8 05/13/2021     Lab Results   Component Value Date    CHLORIDE 102 04/25/2024    CHLORIDE 105 10/07/2022    CHLORIDE 109 05/13/2021     Lab Results   Component Value Date    CO2 25 04/25/2024    CO2 28 10/07/2022    CO2 26 05/13/2021     Lab Results   Component Value Date    BUN 12.6 04/25/2024    BUN 16 10/07/2022    BUN 14 05/13/2021 " "    Lab Results   Component Value Date    CR 0.76 04/25/2024    CR 0.82 05/13/2021       Lab Results   Component Value Date    WBC 6.9 04/25/2024    WBC 6.2 05/13/2021     Lab Results   Component Value Date    HGB 16.4 04/25/2024    HGB 14.6 05/13/2021     Lab Results   Component Value Date    HCT 46.5 04/25/2024    HCT 42.2 05/13/2021     Lab Results   Component Value Date    MCV 94 04/25/2024    MCV 93 05/13/2021     Lab Results   Component Value Date     04/25/2024     05/13/2021     Lab Results   Component Value Date    AST 47 04/25/2024    AST 39 05/13/2021     Lab Results   Component Value Date    ALT 35 04/25/2024    ALT 55 05/13/2021     No results found for: \"BILICONJ\"   Lab Results   Component Value Date    BILITOTAL 0.9 04/25/2024    BILITOTAL 1.0 05/13/2021     Lab Results   Component Value Date    ALBUMIN 4.5 04/25/2024    ALBUMIN 3.7 10/07/2022    ALBUMIN 3.8 05/13/2021     Lab Results   Component Value Date    PROTTOTAL 7.6 04/25/2024    PROTTOTAL 7.0 05/13/2021      Lab Results   Component Value Date    ALKPHOS 89 04/25/2024    ALKPHOS 74 05/13/2021       Lab Results   Component Value Date    INR 1.20 04/25/2024       Imaging:        Right upper quadrant ultrasound 2/15/24     Comparisons: Aortic ultrasound 2/13/2024     HISTORY: Increased liver echogenicity on prior ultrasound     FINDINGS: The liver measures a craniocaudal dimension of 16.0 cm. No focal liver lesion. There is marked diffuse increased echogenicity of the liver parenchyma with significant attenuation of the ultrasound beam. The gallbladder is normal. There is no gallbladder wall thickening or pericholecystic fluid. No sonographic Jacinto's sign was elicited.  The diameter of the common bile duct measures 3mm. The pancreas is partially obscured but otherwise appears unremarkable. The aorta and IVC are visualized. The right kidney measures 11.8 cm. No solid renal mass, stone or hydronephrosis.                               "                                     IMPRESSION:    Diffuse intrinsic hepatic parenchymal disease in a  pattern most compatible with severe diffuse fatty infiltration.     GIANNA MONTANA MD               ABDOMINAL AORTIC SCREENING ULTRASOUND 2/13/2024 8:07 AM     CLINICAL HISTORY: Encounter for abdominal aortic aneurysm (AAA)  screening. Screening abdominal aortic ultrasound requested.     COMPARISONS: None available.     ORDERING PROVIDER: CARLOTTA WILKES     TECHNIQUE: Grayscale images were obtained through the aorta and common  iliac arteries. Diameters were measured in the longitudinal and  transverse planes.      FINDINGS: Artery diameters (longitudinal / transverse):     Aorta:       Proximal: 2.8 cm /  2.8 x 2.6 cm       Mid: 2.2 cm /  2.0 x 2.0 cm       Distal: 1.6 cm /  1.7 x 2.0 cm     Right common iliac artery: 1.4 cm /  1.4 cm     Left common iliac artery: 1.3 cm /  1.4 cm     Liver is increased in echogenicity and echotexture but incompletely  evaluated in this study.     Bladder outpouchings incompletely demonstrated in this study.                                                                      IMPRESSION:  1. Ascending aorta measures up to 2.8 cm in diameter.     2. Increased hepatic echogenicity and echotexture. Liver incompletely  evaluated in this study. Differential includes cirrhosis, hepatic  steatosis, and hepatitis. Liver lesions cannot be excluded. If  clinically indicated, further evaluation with abdominal ultrasound or  liver MR could be performed.     3. Bladder outpouchings suggested but incompletely evaluated in this  study. Possible ureteroceles or diverticula. If clinically indicated,  further evaluation with cystoscopy or CT urogram/cystogram.     Aorta diameter measurement classification:  < 2.5 cm: Normal  2.5 - 2.9 cm: Ectatic  > 3 cm: Aneurysmal     Iliac artery:  Males: > or = 1.7 cm: Ectatic  Females: > or = 1.5 cm: Ectatic  > 2.5 cm: Aneurysmal     Sruthi Ortiz  G, Vladislav SHIPMAN et-al. Managing incidental findings on  abdominal and pelvic CT and MRI, Part 2: white paper of the ACR  Incidental Findings Committee II on vascular findings. J Am Radha  Radiol. 2013;10 (10): 789-94. doi:[email or web address]/[email or web address]      DIEUDONNE CHAPIN MD             Again, thank you for allowing me to participate in the care of your patient.        Sincerely,        Ani Enamorado PA-C    Electronically signed

## 2025-03-29 LAB
LABORATORY REPORT: NORMAL
PETH INTERPRETATION: NORMAL
PLPETH BLD-MCNC: 56 NG/ML
POPETH BLD-MCNC: 303 NG/ML

## 2025-03-31 ENCOUNTER — TELEPHONE (OUTPATIENT)
Dept: GASTROENTEROLOGY | Facility: CLINIC | Age: 65
End: 2025-03-31

## 2025-03-31 ENCOUNTER — IMMUNIZATION (OUTPATIENT)
Dept: FAMILY MEDICINE | Facility: CLINIC | Age: 65
End: 2025-03-31
Payer: COMMERCIAL

## 2025-03-31 DIAGNOSIS — Z23 ENCOUNTER FOR IMMUNIZATION: Primary | ICD-10-CM

## 2025-03-31 PROCEDURE — 91320 SARSCV2 VAC 30MCG TRS-SUC IM: CPT

## 2025-03-31 PROCEDURE — 99207 PR NO CHARGE NURSE ONLY: CPT

## 2025-03-31 PROCEDURE — 90480 ADMN SARSCOV2 VAC 1/ONLY CMP: CPT

## 2025-03-31 NOTE — TELEPHONE ENCOUNTER
Patient confirmed scheduled appointment:     Date: 5/1/25  Time: 800am Virtual  Appointment Type: Return Liver  Provider: Ani Enamorado PA-C  Location: [place]  Testing/imaging: Fibro  Additional Notes:

## 2025-03-31 NOTE — PROGRESS NOTES
Prior to immunization administration, verified patients identity using patient s name and date of birth. Please see Immunization Activity for additional information.     Is the patient's temperature normal (100.5 or less)? Yes     Patient MEETS CRITERIA. PROCEED with vaccine administration.        3/31/2025   General Questionnaire    Do you have any questions for your care team about the vaccines you will be receiving today? no             3/31/2025   COVID   Have you had myocarditis or pericarditis (inflammation of or around the heart muscle) after getting a COVID-19 vaccine? No   Have you had a serious reaction to a COVID vaccine or something in a COVID vaccine, like polyethylene glycol (PEG) or polysorbate? No   Have you had multisystem inflammatory syndrome from COVID-19 in the past 90 days? No   Have you received a bone marrow transplant within the previous 3 months? No         Patient MEETS CRITERIA. PROCEED with vaccine administration.        Patient instructed to remain in clinic for 15 minutes afterwards, and to report any adverse reactions.      Link to Ancillary Visit Immunization Standing Orders SmartSet     Screening performed by Kaycee Alford on 3/31/2025 at 1:55 PM.

## 2025-04-20 ENCOUNTER — NURSE TRIAGE (OUTPATIENT)
Dept: NURSING | Facility: CLINIC | Age: 65
End: 2025-04-20
Payer: COMMERCIAL

## 2025-04-20 NOTE — TELEPHONE ENCOUNTER
Patient called with some muscle pain post working out at the gym. Patient wondering whether he can take an NSAID while on his current prescriptions. Current prescription that he is concerned about is patient reported in our chart. According to up to date there could be interactions with his current medications. Encouraged patient to reach out to his pharmacist or the cardiologist that ordered the medications. Writer offered to triage patients muscle pain symptoms, but patient declined and will reach out to the pharmacist.       Reason for Disposition   [1] Caller has medicine question about med NOT prescribed by PCP AND [2] triager unable to answer question (e.g., compatibility with other med, storage)    Additional Information   Negative: [1] Intentional drug overdose AND [2] suicidal thoughts or ideas   Negative: Drug overdose and triager unable to answer question   Negative: Caller requesting a renewal or refill of a medicine patient is currently taking   Negative: Caller requesting information unrelated to medicine   Negative: Caller requesting information about COVID-19 Vaccine   Negative: Caller requesting information about Emergency Contraception   Negative: Caller requesting information about Combined Birth Control Pills   Negative: Caller requesting information about Progestin Birth Control Pills   Negative: Caller requesting information about Post-Op pain or medicines   Negative: Caller requesting a prescription antibiotic (such as Penicillin) for Strep throat and has a positive culture result   Negative: Caller requesting a prescription anti-viral med (such as Tamiflu) and has influenza (flu) symptoms   Negative: Immunization reaction suspected   Negative: Rash while taking a medicine or within 3 days of stopping it   Negative: [1] Asthma and [2] having symptoms of asthma (cough, wheezing, etc.)   Negative: [1] Symptom of illness (e.g., headache, abdominal pain, earache, vomiting) AND [2] more than mild    Negative: Breastfeeding questions about mother's medicines and diet   Negative: MORE THAN A DOUBLE DOSE of a prescription or over-the-counter (OTC) drug   Negative: [1] DOUBLE DOSE (an extra dose or lesser amount) of prescription drug AND [2] any symptoms (e.g., dizziness, nausea, pain, sleepiness)   Negative: [1] DOUBLE DOSE (an extra dose or lesser amount) of over-the-counter (OTC) drug AND [2] any symptoms (e.g., dizziness, nausea, pain, sleepiness)   Negative: Took another person's prescription drug   Negative: [1] DOUBLE DOSE (an extra dose or lesser amount) of prescription drug AND [2] NO symptoms  (Exception: A double dose of antibiotics.)   Negative: Diabetes drug error or overdose (e.g., took wrong type of insulin or took extra dose)   Negative: [1] Prescription not at pharmacy AND [2] was prescribed by PCP recently (Exception: Triager has access to EMR and prescription is recorded there. Go to Home Care and confirm for pharmacy.)   Negative: [1] Pharmacy calling with prescription question AND [2] triager unable to answer question   Negative: [1] Caller has URGENT medicine question about med that PCP or specialist prescribed AND [2] triager unable to answer question   Negative: Medicine patch causing local rash or itching    Protocols used: Medication Question Call-A-

## 2025-05-01 ENCOUNTER — VIRTUAL VISIT (OUTPATIENT)
Dept: GASTROENTEROLOGY | Facility: CLINIC | Age: 65
End: 2025-05-01
Payer: COMMERCIAL

## 2025-05-01 DIAGNOSIS — K76.0 HEPATIC STEATOSIS: Primary | ICD-10-CM

## 2025-05-01 DIAGNOSIS — R79.89 LFTS ABNORMAL: ICD-10-CM

## 2025-05-01 DIAGNOSIS — K76.0 STEATOSIS OF LIVER: ICD-10-CM

## 2025-05-01 DIAGNOSIS — F10.90 ALCOHOL USE DISORDER: ICD-10-CM

## 2025-05-01 NOTE — PROGRESS NOTES
Hepatology Clinic Note  Gael Le   Date of Birth 1960    REASON FOR FOLLOW UP: Hepatic steatosis, elevated AST, alcohol use disorder    Virtual Visit Details  Type of service:  Video Visit   Video Start Time: 8:04 AM  Video End Time: 8:21 AM  Originating Location (pt. Location): Home  Distant Location (provider location):  On-site  Platform used for Video Visit: [employer]         Total time for E/M services performed on the date of the encounter 35 minutes.  This included review of previous: clinic visits, hospital records, lab results, imaging studies, and procedural documentation. Time also includes patient visit, documentation and discussion with other providers.  The findings from this review are summarized in the above note.           Assessment/plan:      65 YO M with PMHx significant for GERD, nontoxic multinodular goiter, hypercholesterolemia, paroxysmal A-fib on Eliquis, essential HTN and anxiety who presents for follow-up regarding elevated LFTs, alcohol use disorder and hepatic steatosis.  He was last seen in clinic by myself for consult 4/25/24.       No hx of liver biopsy.  Denies known family history of liver cancer or liver disease.     Hepatic steatosis, S2              > Suspect both alcohol and metabolic related   Elevated LFTs (now WNL x2; recently much more elevated during recent hospital admission March 2025; likely 2/2 alcohol use vs steatohepatitis vs less likely other)  Hx mild coagulopathy (likely driven by the fact that pt is on Eliquis)  Recent daily alcohol use    > PETH 303 3/27/25              > Last use 3/9/25; before this, was consuming 6-7 6 oz glasses of wine per day              > Hx of heavier alcohol use in his 20s; admits he was sober from 1991 until 2016; prior to pandemic starting, was typically drinking 1-2 drinks per week; use increased when COVID started              > No hx of DUI or legal ramifications 2/2 alcohol use              > No hx of completing chemical  dependency treatment/program     Abd aortic screening US 2/13/24: Increased hepatic echogenicity and echotexture. Liver incompletely evaluated. Differential includes cirrhosis, hepatic steatosis, and hepatitis. Liver lesions cannot be excluded. If clinically indicated, further eval with abdominal US or liver MR could be performed.     US abd foster 2/15/24: liver w/ craniocaudal dimension of 16.0 cm. No focal liver lesion. Marked diffuse increased echogenicity of liver parenchyma with significant attenuation of US beam. Gallbladder normal. No gallbladder wall thickening or pericholecystic fluid. No sonographic Jacinto's sign. CBD 3mm     FIB-4 Calculation: 1.67 at 3/27/2025     Fibroscan 4/22/25: S2 steatosis and F0/F1 fibrosis     Lab work 3/27/25: LFTs, albumin, Tbili, alk phos, INR PLT all WNL     No ongoing laboratory/imaging findings or clinical evidence to support F3/F4 fibrosis or cirrhosis.     PLAN:  - Reviewed fibroscan results   - No additional labs or imaging at this time  - Continue to monitor and trend MELD labs 1-2x yearly   - Re-discussed alcohol use and congratulated pt on continued sobriety  - Advised pt to continue to abstain from alcohol use at this time               > Moving forward, could consider referral to addiction medicine if pt is struggling with sobriety   - Re-discussed pathophysiology and natural hx of nonalcoholic fatty liver disease   - Recommend slow, gradual weight loss. Discussed how a weight loss of 3-5% can show improvement on steatosis and weight loss of 7-10% can show improvement on fibrosis on histology.   - Maintain good control of cholesterol (pt is on statin)   - Optimize blood glucose as needed. Could consider GLP-1 inhibitor for both insulin resistance and help with weight loss  - limit carbs, especially simple carbs; follow Mediterranean eating patten  - Increase physical activity as able, ideally 150 minutes weekly  - Follow with PCP as recommended     Follow up virtually  in 6 months with labs prior     Ani Enamorado PA-C  [facility]   -----------------------------------------------------         HPI:     Patient presents virtually for follow up. He was last seen in clinic 3/27/25.     Patient denies any recent significant changes to his health since last being seen.  He did start taking PreserVision over-the-counter as recommended by ophthalmology.  Denies taking any other new vitamins or supplements.  Also has been using albuterol inhaler as needed which has been significantly helpful in terms of the cough he was having at nighttime.  At first, he was thinking it could be related to allergies.  Has been using Tylenol as needed sciatic pain.  He states this also has been helpful.  He is not taking Tylenol daily.    No recent fevers, chills, nausea, vomiting or abdominal pain.  No yellowing of the eyes or skin.  Appetite is good.  Weight remains stable.  Denies any concerns regarding his bowel habits.  No bright red blood per rectum or melena.  Patient states he is now working with a .  He does admit that he has been eating out a lot due to recent time constraints.  Admits to occasional, mild lower extremity edema which he attributes to amlodipine.    No recent surgeries or procedures.  No new changes to family medical history.    Denies tobacco use and illicit drug use. He has maintained sobriety.  His last use of alcohol was 3/9/25.  Patient states sobriety is going well.  He denies any current urges or cravings for alcohol.  He had been drinking 6-7 6 ounce glasses of wine per day.  History of completing chemical dependency treatment program.    Previous work-up:   Lab Results   Component Value Date    HEPBANG Nonreactive 04/25/2024    HBCAB Nonreactive 04/25/2024    AUSAB 13.80 04/25/2024    HCVAB  10/13/2016     Nonreactive   Assay performance characteristics have not been established for newborns,   infants, and children      TSH  "3.50 10/07/2022    CHOL 173 02/15/2024    HDL 58 02/15/2024    LDL 92 02/15/2024    TRIG 114 02/15/2024    A1C 5.2 04/25/2024    POPETH 303 03/27/2025    PLPETH 56 03/27/2025      No results found for: \"SPECDES\", \"LDRESULTS\"    PMH:    has a past medical history of Arthritis, Hypertension, Nontoxic multinodular goiter, Paroxysmal atrial fibrillation (H) (11/04/2021), and Supraventricular premature beats.     SMH:    has a past surgical history that includes hernia repair; Finger surgery; Colonoscopy with CO2 insufflation (N/A, 11/21/2016); Abdomen surgery (1968); biopsy (1993); colonoscopy (11/21/2016); orthopedic surgery (1993); Colonoscopy with CO2 insufflation (N/A, 06/13/2022); and other surgical history (03/10/2025).     Medications:   Current Outpatient Medications   Medication Sig Dispense Refill    ALPRAZolam (XANAX) 0.5 MG tablet Take 1 tablet (0.5 mg) by mouth daily as needed for anxiety. +++ appointment needed for additional refills +++ 15 tablet 0    amLODIPine (NORVASC) 5 MG tablet TAKE 1 TABLET BY MOUTH EVERY DAY 90 tablet 0    apixaban ANTICOAGULANT (ELIQUIS) 5 MG tablet Take 5 mg by mouth 2 times daily      atorvastatin (LIPITOR) 40 MG tablet Take 40 mg by mouth at bedtime      flecainide (TAMBOCOR) 100 MG tablet Take 100 mg by mouth every 12 hours.      folic acid (FOLVITE) 1 MG tablet Take 1 tablet (1 mg) by mouth daily. 100 tablet 3    furosemide (LASIX) 20 MG tablet Take 20 mg by mouth.      gabapentin (NEURONTIN) 100 MG capsule Take 1 capsule (100 mg) by mouth 2 times daily. 180 capsule 1    gabapentin (NEURONTIN) 300 MG capsule Take 1 capsule (300 mg) by mouth at bedtime. 90 capsule 1    lidocaine (XYLOCAINE) 2 % external gel Apply a small amount to the affected area up to 4 times daily as needed for pain      loratadine (CLARITIN) 10 MG tablet Take 10 mg by mouth daily      losartan-hydrochlorothiazide (HYZAAR) 50-12.5 MG tablet Take 1 tablet by mouth daily      metoprolol succinate ER (TOPROL " XL) 25 MG 24 hr tablet Take 1 tablet (25 mg) by mouth daily. 90 tablet 1    Multiple Vitamins-Minerals (SUPER THERA BEATRICE M) TABS Take 1 tablet by mouth daily.      mupirocin (BACTROBAN) 2 % external ointment Apply topically 3 times daily 30 g 0    nifedipine 0.2% in white petrolatum 0.2 % OINT ointment Apply topically.      omeprazole (PRILOSEC) 20 MG DR capsule TAKE 1 CAPSULE (20 MG) BY MOUTH DAILY 30-60 MIN PRIOR TO A MEAL 90 capsule 1    tadalafil (CIALIS) 5 MG tablet TAKE 1 TABLET BY MOUTH EVERY DAY 30 tablet 1    terbutaline (BRETHINE) 5 MG tablet At 3 hrs of erection , take 1 tab. At 3 1/2 hrs, take an additional tab. At 4 hrs go to ER.       No current facility-administered medications for this visit.     Recent Labs   Lab Test 03/27/25  1629 04/25/24  0932 02/15/24  1022 10/07/22  0939 05/13/21  0830 10/15/20  0949 06/11/19  0922 05/02/18  0755 09/01/17  0837   ALKPHOS 98 89 95 73 74 88 78 74  --    ALT 32 35 35 41 55 58 36 40 45   AST 32 47* 49* 32 39 41 27 28  --    BILITOTAL 0.6 0.9 0.7 1.1 1.0 1.0 1.1 1.0  --            Allergies:     Allergies   Allergen Reactions    Lisinopril-Hydrochlorothiazide Cough    Papaya Nausea and Vomiting          Social History:     Social History     Socioeconomic History    Marital status:      Spouse name: Not on file    Number of children: Not on file    Years of education: Not on file    Highest education level: Not on file   Occupational History    Not on file   Tobacco Use    Smoking status: Never     Passive exposure: Never    Smokeless tobacco: Never   Vaping Use    Vaping status: Never Used   Substance and Sexual Activity    Alcohol use: Yes     Comment: last use 3/9/25; prior use 6-7 6oz glasses of wine    Drug use: Not Currently     Comment: no hx IVDU    Sexual activity: Yes     Partners: Female     Birth control/protection: Female Surgical     Comment: Cialis, Viagra   Other Topics Concern    Parent/sibling w/ CABG, MI or angioplasty before 65F 55M? No    Social History Narrative    Not on file     Social Drivers of Health     Financial Resource Strain: Low Risk  (1/8/2025)    Received from [email or web address] [facility]    Financial Resource Strain     Difficulty of Paying Living Expenses: 3     Difficulty of Paying Living Expenses: Not on file   Food Insecurity: No Food Insecurity (1/8/2025)    Received from [email or web address] [facility]    Food Insecurity     Do you worry your food will run out before you are able to buy more?: 1   Transportation Needs: No Transportation Needs (1/8/2025)    Received from [email or web address] [facility]    Transportation Needs     Does lack of transportation keep you from medical appointments?: 1     Does lack of transportation keep you from work, meetings or getting things that you need?: 1   Physical Activity: Not on file   Stress: Not on file   Social Connections: Socially Integrated (1/8/2025)    Received from [email or web address] [facility]    Social Connections     Do you often feel lonely or isolated from those around you?: 0   Interpersonal Safety: Not on file   Housing Stability: Low Risk  (1/8/2025)    Received from [email or web address] [facility]    Housing Stability     What is your housing situation today?: 1          Family History:     Family History   Problem Relation Age of Onset    Dementia Mother         multiinfarct dementia    Hypertension Mother     Hyperlipidemia Mother     Cerebrovascular Disease Mother         MID    Depression Mother         Severe    Anxiety Disorder Mother     Diabetes Father         Onset late in life.    Cerebrovascular Disease Father         Left him quadriplegic for 2 yrs    Skin Cancer Father         BCC    Other Cancer Father         minor skin (not treated)    Obesity Father     Myocardial Infarction Paternal Grandmother     Esophageal Cancer Half-Brother     Colon Cancer No family hx of     Prostate Cancer No  "family hx of     Asthma No family hx of     Liver Disease No family hx of     Liver Cancer No family hx of           Review of Systems:   Gen: See HPI          Physical Exam:     Gen: A&Ox3, NAD  Neuro: grossly intact   Psych: appropriate mood and affects         Data:   Reviewed in person and significant for:    Lab Results   Component Value Date     03/27/2025     05/13/2021      Lab Results   Component Value Date    POTASSIUM 4.2 03/27/2025    POTASSIUM 3.8 10/07/2022    POTASSIUM 3.8 05/13/2021     Lab Results   Component Value Date    CHLORIDE 104 03/27/2025    CHLORIDE 105 10/07/2022    CHLORIDE 109 05/13/2021     Lab Results   Component Value Date    CO2 27 03/27/2025    CO2 28 10/07/2022    CO2 26 05/13/2021     Lab Results   Component Value Date    BUN 10.7 03/27/2025    BUN 16 10/07/2022    BUN 14 05/13/2021     Lab Results   Component Value Date    CR 0.78 03/27/2025    CR 0.82 05/13/2021       Lab Results   Component Value Date    WBC 7.2 03/27/2025    WBC 6.2 05/13/2021     Lab Results   Component Value Date    HGB 15.6 03/27/2025    HGB 14.6 05/13/2021     Lab Results   Component Value Date    HCT 45.5 03/27/2025    HCT 42.2 05/13/2021     Lab Results   Component Value Date    MCV 97 03/27/2025    MCV 93 05/13/2021     Lab Results   Component Value Date     03/27/2025     05/13/2021       Lab Results   Component Value Date    AST 32 03/27/2025    AST 39 05/13/2021     Lab Results   Component Value Date    ALT 32 03/27/2025    ALT 55 05/13/2021     No results found for: \"BILICONJ\"   Lab Results   Component Value Date    BILITOTAL 0.6 03/27/2025    BILITOTAL 1.0 05/13/2021       Lab Results   Component Value Date    ALBUMIN 4.5 03/27/2025    ALBUMIN 3.7 10/07/2022    ALBUMIN 3.8 05/13/2021     Lab Results   Component Value Date    PROTTOTAL 7.7 03/27/2025    PROTTOTAL 7.0 05/13/2021      Lab Results   Component Value Date    ALKPHOS 98 03/27/2025    ALKPHOS 74 05/13/2021 "       Lab Results   Component Value Date    INR 0.97 03/27/2025       Imaging:

## 2025-05-01 NOTE — NURSING NOTE
[place] [address] [id number]  [place] [address]    Is the patient currently in the state of [place]? YES    Visit mode: VIDEO    If the visit is dropped, the patient can be reconnected by:VIDEO VISIT: Text to cell phone:   Telephone Information:   Mobile [phone]       Will anyone else be joining the visit? Yes, wife will join patient in the visit  (If patient encounters technical issues they should call [phone])    Are changes needed to the allergy or medication list? Yes preservision supplements per ophthalmologist and a albuterol inhaler PRN    Are refills needed on medications prescribed by this physician? NO    Rooming Documentation:  Questionnaire(s) completed    Reason for visit: RECHECK    Kathia ALLEN

## 2025-05-01 NOTE — PROGRESS NOTES
"Virtual Visit Details    Type of service:  Video Visit   Video Start Time: {video visit start/end time for provider to select:491855}  Video End Time:{video visit start/end time for provider to select:574034}    Originating Location (pt. Location): {video visit patient location:097256::\"Home\"}  {PROVIDER LOCATION On-site should be selected for visits conducted from your clinic location or adjoining [facility] hospital, academic office, or other nearby [facility] building. Off-site should be selected for all other provider locations, including home:406[address]}  Distant Location (provider location):  {virtual location provider:201343}  Platform used for Video Visit: {Virtual Visit Platforms:615558::\"[employer]\"}    "

## 2025-05-06 ENCOUNTER — TELEPHONE (OUTPATIENT)
Dept: GASTROENTEROLOGY | Facility: CLINIC | Age: 65
End: 2025-05-06
Payer: COMMERCIAL

## 2025-05-06 NOTE — TELEPHONE ENCOUNTER
Left Voicemail (1st Attempt) and Sent Mychart (1st Attempt) for the patient to call back and schedule the following:    Appointment type: Return Liver video  Provider: Ani Enamorado PA-C  Return date: November 2025  Specialty phone number: [phone]  Additional appointment(s) needed: Labs 1wk prior  Additonal Notes: NA

## 2025-05-07 ENCOUNTER — MYC MEDICAL ADVICE (OUTPATIENT)
Dept: FAMILY MEDICINE | Facility: CLINIC | Age: 65
End: 2025-05-07
Payer: COMMERCIAL

## 2025-05-07 NOTE — TELEPHONE ENCOUNTER
Patient is asking about stopping the bedtime dosage of Gabapentin. Currently taking 300 mg at bedtime. Does patient need to wean off slowly or can he just stop? Please advise. Edil Lindsay, RN, BSN, PHN

## 2025-05-08 ENCOUNTER — TELEPHONE (OUTPATIENT)
Dept: GASTROENTEROLOGY | Facility: CLINIC | Age: 65
End: 2025-05-08
Payer: COMMERCIAL

## 2025-05-08 NOTE — TELEPHONE ENCOUNTER
Patient confirmed scheduled appointment:     Date: 11/3/25  Time: 8:00 am  Appointment Type: Return Liver  Provider: Ani Enamorado PA-C  Location: [place]  Testing/imaging: Labs  Additional Notes:

## 2025-05-14 DIAGNOSIS — I10 BENIGN ESSENTIAL HYPERTENSION: ICD-10-CM

## 2025-05-14 RX ORDER — AMLODIPINE BESYLATE 5 MG/1
5 TABLET ORAL
Qty: 90 TABLET | Refills: 0 | Status: SHIPPED | OUTPATIENT
Start: 2025-05-14

## 2025-05-25 DIAGNOSIS — K21.9 GASTROESOPHAGEAL REFLUX DISEASE WITHOUT ESOPHAGITIS: ICD-10-CM

## 2025-05-27 RX ORDER — OMEPRAZOLE 20 MG/1
20 CAPSULE, DELAYED RELEASE ORAL DAILY
Qty: 90 CAPSULE | Refills: 1 | Status: SHIPPED | OUTPATIENT
Start: 2025-05-27

## 2025-06-03 ENCOUNTER — VIRTUAL VISIT (OUTPATIENT)
Dept: FAMILY MEDICINE | Facility: CLINIC | Age: 65
End: 2025-06-03
Payer: COMMERCIAL

## 2025-06-03 DIAGNOSIS — R39.198 DECREASED URINE STREAM: ICD-10-CM

## 2025-06-03 DIAGNOSIS — I10 BENIGN ESSENTIAL HYPERTENSION: ICD-10-CM

## 2025-06-03 DIAGNOSIS — Z13.1 SCREENING FOR DIABETES MELLITUS: ICD-10-CM

## 2025-06-03 DIAGNOSIS — R32 INTERMITTENT URINARY INCONTINENCE: ICD-10-CM

## 2025-06-03 DIAGNOSIS — E78.00 HYPERCHOLESTEREMIA: ICD-10-CM

## 2025-06-03 DIAGNOSIS — N40.1 BENIGN PROSTATIC HYPERPLASIA WITH URINARY HESITANCY: ICD-10-CM

## 2025-06-03 DIAGNOSIS — Z13.0 SCREENING FOR DISORDER OF BLOOD AND BLOOD-FORMING ORGANS: ICD-10-CM

## 2025-06-03 DIAGNOSIS — R39.11 BENIGN PROSTATIC HYPERPLASIA WITH URINARY HESITANCY: ICD-10-CM

## 2025-06-03 DIAGNOSIS — Z12.5 SCREENING FOR PROSTATE CANCER: ICD-10-CM

## 2025-06-03 DIAGNOSIS — Z13.29 SCREENING FOR THYROID DISORDER: ICD-10-CM

## 2025-06-03 DIAGNOSIS — J45.20 MILD INTERMITTENT ASTHMA WITHOUT COMPLICATION: Primary | ICD-10-CM

## 2025-06-03 PROCEDURE — 98006 SYNCH AUDIO-VIDEO EST MOD 30: CPT | Performed by: NURSE PRACTITIONER

## 2025-06-03 RX ORDER — ALBUTEROL SULFATE 90 UG/1
2 INHALANT RESPIRATORY (INHALATION) EVERY 4 HOURS PRN
Qty: 18 G | Refills: 1 | Status: SHIPPED | OUTPATIENT
Start: 2025-06-03

## 2025-06-03 RX ORDER — TAMSULOSIN HYDROCHLORIDE 0.4 MG/1
0.4 CAPSULE ORAL DAILY
Qty: 30 CAPSULE | Refills: 1 | Status: SHIPPED | OUTPATIENT
Start: 2025-06-03

## 2025-06-03 NOTE — PROGRESS NOTES
"  If patient has telephone visit, have they been educated on video visit as preferred visit method and offered to change to video visit? NOT APPLICABLE        Instructions Relayed to Patient by Virtual Roomer:     Patient is active on [employer]:   Relayed following to patient: \"It looks like you are active on [employer], are you able to join the visit this way? If not, do you need us to send you a link now or would you like your provider to send a link via text or email when they are ready to initiate the visit?\"      Patient Confirmed they will join visit via: [employer]  Reminded patient to ensure they were logged on to virtual visit by arrival time listed.   Asked if patient has flexibility to initiate visit sooner than arrival time: patient is unable to initiate visit earlier than arrival time     If pediatric virtual visit, ensured pediatric patient along with parent/guardian will be present for video visit.     Patient offered the website [email or web address] and/or phone number to [employer] Help line: [phone]      Answers submitted by the patient for this visit:  General Questionnaire (Submitted on 6/3/2025)  Chief Complaint: Chronic problems general questions HPI Form  How many days per week do you miss taking your medication?: 0  General Concern (Submitted on 6/3/2025)  Chief Complaint: Chronic problems general questions HPI Form  What is the reason for your visit today?: Albutorol Rx and Urinary Symptom  What are your symptoms?: 1. Cough some weezing. 2. Night time mild incontenence  How would you describe these symptoms?: Mild  Are your symptoms:: Staying the same  Have you had these symptoms before?: Yes  Have you tried or received treatment for these symptoms before?: Yes  Did that treatment work? : No  Is there anything that makes you feel better?: 1. Albutorol. 2. No pattern  Questionnaire about: Chronic problems general questions HPI Form (Submitted on 6/3/2025)  Chief Complaint: Chronic " problems general questions HPI Form  Gael is a 64 year old who is being evaluated via a billable video visit.    How would you like to obtain your AVS? [employer]  If the video visit is dropped, the invitation should be resent by: Text to cell phone: [phone]  Will anyone else be joining your video visit? No        Subjective   Gael is a 64 year old, presenting for the following health issues:  Cough and Urinary Problem        6/3/2025     2:21 PM   Additional Questions   Roomed by Flavio   Accompanied by self       Video Start Time: 4:51 PM      See Expert TA message 5/18/2025.  History of Present Illness       Reason for visit:  Albutorol Rx and Urinary Symptom  Symptoms include:  1. Cough some weezing. 2. Night time mild incontenence  Symptom intensity:  Mild  Symptom progression:  Staying the same  Had these symptoms before:  Yes  Has tried/received treatment for these symptoms:  Yes  Previous treatment was successful:  No  What makes it better:  1. Albutorol. 2. No pattern   He is taking medications regularly.        Acute Illness  Acute illness concerns:   has had a cough for several years  Had been treating with oral anti histamine   Gets worse this time of year and now with smoke has noted more of a cough  Tried wife inhaler and helped with the cough almost immediately   Usually worse at night   The cough is mostly in the spring and some at other times but not as bad   When he was a child remember when biking would feel like had feathers in his lungs     Genitourinary - Male  Onset/Duration: began to notice harder to urinate and also to start urine   Was given Flomax in the past but did not start   Then had an episode where he feels like he wet himself one time   Description:   Dysuria (painful urination): No}  Hematuria (blood in urine): No  Frequency: No  Waking at night to urinate: YES- 2 times   Hesitancy (delay in urine): YES  Retention (unable to empty): YES  Decrease in urinary flow:  YES  Incontinence: YES  Progression of Symptoms:  waxing and waning  Accompanying Signs & Symptoms:  Fever: No  Back/Flank pain: No  Urethral discharge: No  Testicle lumps/masses/pain: No  Nausea and/or vomiting: No  Abdominal pain: No  History:   History of frequent UTI s: No  History of kidney stones: No  History of hernias: does have an inguinal hernia   Personal or Family history of Prostate problems: No  Sexually active: YES  Precipitating or alleviating factors: None  Therapies tried and outcome: none    Labs reviewed in EPIC  BP Readings from Last 3 Encounters:   03/27/25 (!) 159/80   08/17/23 (!) 155/90   08/22/22 (!) 164/82    Wt Readings from Last 3 Encounters:   03/27/25 84.3 kg (185 lb 12.8 oz)   08/17/23 81.6 kg (180 lb)   08/22/22 80.9 kg (178 lb 6.4 oz)                  Patient Active Problem List   Diagnosis    Anxiety state    Hypercholesteremia    Supraventricular premature beats    Erectile dysfunction    Nontoxic multinodular goiter    Tinnitus    Hearing deficit, bilateral    Benign essential hypertension    Gastroesophageal reflux disease without esophagitis    Paroxysmal atrial fibrillation (H)    Alcohol use disorder    Anal fissure    Hypomagnesemia    Steatosis of liver     Past Surgical History:   Procedure Laterality Date    ABDOMEN SURGERY  1968    Hernia    BIOPSY  1993    Bone Cyst - none cancerous    COLONOSCOPY  11/21/2016    4 small polyps, some pockets    COLONOSCOPY WITH CO2 INSUFFLATION N/A 11/21/2016    Procedure: COLONOSCOPY WITH CO2 INSUFFLATION;  Surgeon: Adeel Graf MD;  Location: [facility] OR    COLONOSCOPY WITH CO2 INSUFFLATION N/A 06/13/2022    Procedure: COLONOSCOPY, WITH CO2 INSUFFLATION;  Surgeon: Adeel Graf MD;  Location: [facility] OR    FINGER SURGERY      HERNIA REPAIR      ORTHOPEDIC SURGERY  1993    Bone cyst in Right Ring finger    OTHER SURGICAL HISTORY  03/10/2025    cardiac ablation       Social History     Tobacco Use    Smoking status: Never      Passive exposure: Never    Smokeless tobacco: Never   Substance Use Topics    Alcohol use: Yes     Comment: last use 3/9/25; prior use 6-7 6oz glasses of wine     Family History   Problem Relation Age of Onset    Dementia Mother         multiinfarct dementia    Hypertension Mother     Hyperlipidemia Mother     Cerebrovascular Disease Mother         MID    Depression Mother         Severe    Anxiety Disorder Mother     Diabetes Father         Onset late in life.    Cerebrovascular Disease Father         Left him quadriplegic for 2 yrs    Skin Cancer Father         BCC    Other Cancer Father         minor skin (not treated)    Obesity Father     Myocardial Infarction Paternal Grandmother     Esophageal Cancer Half-Brother     Colon Cancer No family hx of     Prostate Cancer No family hx of     Asthma No family hx of     Liver Disease No family hx of     Liver Cancer No family hx of          Current Outpatient Medications   Medication Sig Dispense Refill    ALPRAZolam (XANAX) 0.5 MG tablet Take 1 tablet (0.5 mg) by mouth daily as needed for anxiety. +++ appointment needed for additional refills +++ 15 tablet 0    amLODIPine (NORVASC) 5 MG tablet TAKE 1 TABLET BY MOUTH EVERY DAY 90 tablet 0    apixaban ANTICOAGULANT (ELIQUIS) 5 MG tablet Take 5 mg by mouth 2 times daily      atorvastatin (LIPITOR) 40 MG tablet Take 40 mg by mouth at bedtime      flecainide (TAMBOCOR) 100 MG tablet Take 100 mg by mouth every 12 hours.      folic acid (FOLVITE) 1 MG tablet Take 1 tablet (1 mg) by mouth daily. 100 tablet 3    furosemide (LASIX) 20 MG tablet Take 20 mg by mouth.      gabapentin (NEURONTIN) 100 MG capsule Take 1 capsule (100 mg) by mouth 2 times daily. 180 capsule 1    gabapentin (NEURONTIN) 300 MG capsule Take 1 capsule (300 mg) by mouth at bedtime. 90 capsule 1    lidocaine (XYLOCAINE) 2 % external gel Apply a small amount to the affected area up to 4 times daily as needed for pain      losartan-hydrochlorothiazide  (HYZAAR) 50-12.5 MG tablet Take 1 tablet by mouth daily      metoprolol succinate ER (TOPROL XL) 25 MG 24 hr tablet Take 1 tablet (25 mg) by mouth daily. 90 tablet 1    Multiple Vitamins-Minerals (SUPER THERA BEATRICE M) TABS Take 1 tablet by mouth daily.      nifedipine 0.2% in white petrolatum 0.2 % OINT ointment Apply topically.      omeprazole (PRILOSEC) 20 MG DR capsule TAKE 1 CAPSULE (20 MG) BY MOUTH DAILY 30-60 MIN PRIOR TO A MEAL 90 capsule 1    tadalafil (CIALIS) 5 MG tablet TAKE 1 TABLET BY MOUTH EVERY DAY 30 tablet 1    terbutaline (BRETHINE) 5 MG tablet At 3 hrs of erection , take 1 tab. At 3 1/2 hrs, take an additional tab. At 4 hrs go to ER.      mupirocin (BACTROBAN) 2 % external ointment Apply topically 3 times daily 30 g 0     Allergies   Allergen Reactions    Lisinopril-Hydrochlorothiazide Cough    Papaya Nausea and Vomiting             Review of Systems  Constitutional, neuro, ENT, endocrine, pulmonary, cardiac, gastrointestinal, genitourinary, musculoskeletal, integument and psychiatric systems are negative, except as otherwise noted.      Objective           Vitals:  No vitals were obtained today due to virtual visit.    Physical Exam   GENERAL: alert and no distress  EYES: Eyes grossly normal to inspection and conjunctivae and sclerae normal  HENT: normal cephalic/atraumatic and oral mucous membranes moist  RESP: normal respiratory rate and rhythm and   unlabored respirations, no intercostal retractions or accessory muscle use    MS: No gross musculoskeletal defects noted.  Normal range of motion.  No visible edema.  SKIN: Visible skin clear. No significant rash, abnormal pigmentation or lesions.  NEURO: mentation intact  PSYCH: Appropriate affect, tone, and pace of words    Pending orders and results       Assessment & Plan     Mild intermittent asthma without complication  The pathophysiology of asthma is explained. We've discussed the importance of compliance with medical regimen, and various  "treatment modalities such as beta agonists and inhaled steroids. The concepts of prophylactic and episodic or 'rescue' therapy has been discussed. The patient indicates understanding of these issues and knows when to call this office for help in treatment of asthma.  Will Start:  - albuterol (PROAIR HFA/PROVENTIL HFA/VENTOLIN HFA) 108 (90 Base) MCG/ACT inhaler  Dispense: 18 g; Refill: 1  - Spacer/Aero-Holding Chambers (SPACER/AERO-HOLD CHAMBER MASK) AKIL  Dispense: 1 each; Refill: 0  Follow up in 1 month with Dr Marroquin     Decreased urine stream  Will follow up and/or notify patient of  results via My Chart to determine further need for followup   - Prostate Specific Antigen Screen  Will Start:  - tamsulosin (FLOMAX) 0.4 MG capsule  Dispense: 30 capsule; Refill: 1  Referral ordered follow up as indicated with specialist   - Adult Urology  Referral    Benign prostatic hyperplasia with urinary hesitancy  Will Start:  - tamsulosin (FLOMAX) 0.4 MG capsule  Dispense: 30 capsule; Refill: 1  - Adult Urology  Referral    Intermittent urinary incontinence  Referral ordered follow up as indicated with specialist   - Adult Urology  Referral    Screening for diabetes mellitus  - Comprehensive metabolic panel  - Hemoglobin A1c    Screening for prostate cancer  - Prostate Specific Antigen Screen    Hypercholesteremia  Lipid     Benign essential hypertension  - Comprehensive metabolic panel  - Albumin Random Urine Quantitative with Creat Ratio    Screening for disorder of blood and blood-forming organs  - CBC with platelets    Screening for thyroid disorder  - TSH with free T4 reflex    BMI  Estimated body mass index is 27.44 kg/m  as calculated from the following:    Height as of 3/27/25: 1.753 m (5' 9\").    Weight as of 3/27/25: 84.3 kg (185 lb 12.8 oz).   Weight management plan: Discussed healthy diet and exercise guidelines    See Patient Instructions  Patient Instructions   PLAN:   1.   Symptomatic " therapy suggested: will start on albuterol as needed for cough  Will start on Flomax once a day for urine stream.  2.  Orders Placed This Encounter   Medications    albuterol (PROAIR HFA/PROVENTIL HFA/VENTOLIN HFA) 108 (90 Base) MCG/ACT inhaler     Sig: Inhale 2 puffs into the lungs every 4 hours as needed.     Dispense:  18 g     Refill:  1     Pharmacy may dispense brand covered by insurance (Proair, or proventil or ventolin or generic albuterol inhaler)    Spacer/Aero-Holding Chambers (SPACER/AERO-HOLD CHAMBER MASK) AKIL     Si Device as needed.     Dispense:  1 each     Refill:  0    tamsulosin (FLOMAX) 0.4 MG capsule     Sig: Take 1 capsule (0.4 mg) by mouth daily.     Dispense:  30 capsule     Refill:  1     Orders Placed This Encounter   Procedures    Prostate Specific Antigen Screen    CBC with platelets    Comprehensive metabolic panel    Albumin Random Urine Quantitative with Creat Ratio    TSH with free T4 reflex    Hemoglobin A1c    Adult Urology  Referral       3.  CONSULTATION/REFERRAL to urology   FUTURE LABS:       - Schedule a fasting blood draw   Will follow up and/or notify patient of  results via My Chart to determine further need for followup   Follow up in 1 month with Dr Marroquin    Patient needs to follow up in if no improvement,or sooner if worsening of symptoms or other symptoms develop.         Follow-up       Appointments in Next Year      2025 5:00 PM  (Arrive by 4:55 PM)  Provider Visit with KATH Gilmore CNP  [facility] ([facility] ) [phone]     2025 7:40 AM  (Arrive by 7:20 AM)  Preventative Adult Visit with Idania Marroquin MD  [facility] ([facility] ) [phone]     Oct 27, 2025 10:00 AM  LAB with BA LAB ONLY  [facility] ([facility] ) [phone]     2025 8:00  AM  (Arrive by 7:45 AM)  Return General Liver with Ani Enamorado PA-C  [facility] [place] ([facility] [place] ) [phone]               Video-Visit Details    Type of service:  Video Visit   Video End Time:5:18 pm   Originating Location (pt. Location): Home    Distant Location (provider location):  On-site  Platform used for Video Visit: Wilder  Signed Electronically by: KATH Gilmore CNP

## 2025-06-03 NOTE — PATIENT INSTRUCTIONS
PLAN:   1.   Symptomatic therapy suggested: will start on albuterol as needed for cough  Will start on Flomax once a day for urine stream.  2.  Orders Placed This Encounter   Medications    albuterol (PROAIR HFA/PROVENTIL HFA/VENTOLIN HFA) 108 (90 Base) MCG/ACT inhaler     Sig: Inhale 2 puffs into the lungs every 4 hours as needed.     Dispense:  18 g     Refill:  1     Pharmacy may dispense brand covered by insurance (Proair, or proventil or ventolin or generic albuterol inhaler)    Spacer/Aero-Holding Chambers (SPACER/AERO-HOLD CHAMBER MASK) AKIL     Si Device as needed.     Dispense:  1 each     Refill:  0    tamsulosin (FLOMAX) 0.4 MG capsule     Sig: Take 1 capsule (0.4 mg) by mouth daily.     Dispense:  30 capsule     Refill:  1     Orders Placed This Encounter   Procedures    Prostate Specific Antigen Screen    CBC with platelets    Comprehensive metabolic panel    Albumin Random Urine Quantitative with Creat Ratio    TSH with free T4 reflex    Hemoglobin A1c    Adult Urology  Referral       3.  CONSULTATION/REFERRAL to urology   FUTURE LABS:       - Schedule a fasting blood draw   Will follow up and/or notify patient of  results via My Chart to determine further need for followup   Follow up in 1 month with Dr Marroquin    Patient needs to follow up in if no improvement,or sooner if worsening of symptoms or other symptoms develop.

## 2025-06-04 ENCOUNTER — PATIENT OUTREACH (OUTPATIENT)
Dept: CARE COORDINATION | Facility: CLINIC | Age: 65
End: 2025-06-04
Payer: COMMERCIAL

## 2025-06-12 ENCOUNTER — LAB (OUTPATIENT)
Dept: LAB | Facility: CLINIC | Age: 65
End: 2025-06-12
Payer: COMMERCIAL

## 2025-06-12 DIAGNOSIS — Z13.6 SCREENING FOR CARDIOVASCULAR CONDITION: Primary | ICD-10-CM

## 2025-06-12 DIAGNOSIS — E78.00 HYPERCHOLESTEREMIA: ICD-10-CM

## 2025-06-16 ENCOUNTER — E-VISIT (OUTPATIENT)
Dept: FAMILY MEDICINE | Facility: CLINIC | Age: 65
End: 2025-06-16
Payer: COMMERCIAL

## 2025-06-16 DIAGNOSIS — R05.1 ACUTE COUGH: ICD-10-CM

## 2025-06-16 DIAGNOSIS — J45.21 MILD INTERMITTENT ASTHMA WITH ACUTE EXACERBATION: Primary | ICD-10-CM

## 2025-06-16 RX ORDER — BENZONATATE 100 MG/1
100 CAPSULE ORAL 3 TIMES DAILY PRN
Qty: 30 CAPSULE | Refills: 1 | Status: CANCELLED | OUTPATIENT
Start: 2025-06-16

## 2025-06-16 RX ORDER — PREDNISONE 20 MG/1
40 TABLET ORAL DAILY
Qty: 10 TABLET | Refills: 0 | Status: SHIPPED | OUTPATIENT
Start: 2025-06-16 | End: 2025-06-21

## 2025-06-16 NOTE — TELEPHONE ENCOUNTER
Provider E-Visit time total (minutes): 7 minutes            Chart review:  tessalon taking in 2019  Consider steroid if no benefit from tessalon previously.

## 2025-06-26 DIAGNOSIS — N40.1 BENIGN PROSTATIC HYPERPLASIA WITH URINARY HESITANCY: ICD-10-CM

## 2025-06-26 DIAGNOSIS — R39.11 BENIGN PROSTATIC HYPERPLASIA WITH URINARY HESITANCY: ICD-10-CM

## 2025-06-26 DIAGNOSIS — R39.198 DECREASED URINE STREAM: ICD-10-CM

## 2025-06-26 RX ORDER — TAMSULOSIN HYDROCHLORIDE 0.4 MG/1
0.4 CAPSULE ORAL DAILY
Qty: 90 CAPSULE | Refills: 1 | Status: SHIPPED | OUTPATIENT
Start: 2025-06-26

## 2025-07-09 ENCOUNTER — LAB (OUTPATIENT)
Dept: LAB | Facility: CLINIC | Age: 65
End: 2025-07-09
Payer: COMMERCIAL

## 2025-07-09 DIAGNOSIS — I10 BENIGN ESSENTIAL HYPERTENSION: ICD-10-CM

## 2025-07-09 DIAGNOSIS — R39.198 DECREASED URINE STREAM: ICD-10-CM

## 2025-07-09 DIAGNOSIS — Z12.5 SCREENING FOR PROSTATE CANCER: ICD-10-CM

## 2025-07-09 DIAGNOSIS — Z13.0 SCREENING FOR DISORDER OF BLOOD AND BLOOD-FORMING ORGANS: ICD-10-CM

## 2025-07-09 DIAGNOSIS — E78.00 HYPERCHOLESTEREMIA: ICD-10-CM

## 2025-07-09 DIAGNOSIS — Z13.1 SCREENING FOR DIABETES MELLITUS: ICD-10-CM

## 2025-07-09 DIAGNOSIS — Z13.29 SCREENING FOR THYROID DISORDER: ICD-10-CM

## 2025-07-09 LAB
ALBUMIN SERPL BCG-MCNC: 4.3 G/DL (ref 3.5–5.2)
ALP SERPL-CCNC: 91 U/L (ref 40–150)
ALT SERPL W P-5'-P-CCNC: 22 U/L (ref 0–70)
ANION GAP SERPL CALCULATED.3IONS-SCNC: 12 MMOL/L (ref 7–15)
AST SERPL W P-5'-P-CCNC: 31 U/L (ref 0–45)
BILIRUB SERPL-MCNC: 0.7 MG/DL
BUN SERPL-MCNC: 15.3 MG/DL (ref 8–23)
CALCIUM SERPL-MCNC: 9.4 MG/DL (ref 8.8–10.4)
CHLORIDE SERPL-SCNC: 101 MMOL/L (ref 98–107)
CHOLEST SERPL-MCNC: 220 MG/DL
CREAT SERPL-MCNC: 0.81 MG/DL (ref 0.67–1.17)
CREAT UR-MCNC: 72.2 MG/DL
EGFRCR SERPLBLD CKD-EPI 2021: >90 ML/MIN/1.73M2
ERYTHROCYTE [DISTWIDTH] IN BLOOD BY AUTOMATED COUNT: 13.5 % (ref 10–15)
EST. AVERAGE GLUCOSE BLD GHB EST-MCNC: 111 MG/DL
FASTING STATUS PATIENT QL REPORTED: YES
FASTING STATUS PATIENT QL REPORTED: YES
GLUCOSE SERPL-MCNC: 101 MG/DL (ref 70–99)
HBA1C MFR BLD: 5.5 % (ref 0–5.6)
HCO3 SERPL-SCNC: 24 MMOL/L (ref 22–29)
HCT VFR BLD AUTO: 44.5 % (ref 40–53)
HDLC SERPL-MCNC: 60 MG/DL
HGB BLD-MCNC: 15.5 G/DL (ref 13.3–17.7)
LDLC SERPL CALC-MCNC: 86 MG/DL
MCH RBC QN AUTO: 31.4 PG (ref 26.5–33)
MCHC RBC AUTO-ENTMCNC: 34.8 G/DL (ref 31.5–36.5)
MCV RBC AUTO: 90 FL (ref 78–100)
MICROALBUMIN UR-MCNC: <12 MG/L
MICROALBUMIN/CREAT UR: NORMAL MG/G{CREAT}
NONHDLC SERPL-MCNC: 160 MG/DL
PLATELET # BLD AUTO: 159 10E3/UL (ref 150–450)
POTASSIUM SERPL-SCNC: 4 MMOL/L (ref 3.4–5.3)
PROT SERPL-MCNC: 6.9 G/DL (ref 6.4–8.3)
PSA SERPL DL<=0.01 NG/ML-MCNC: 0.64 NG/ML (ref 0–4.5)
RBC # BLD AUTO: 4.94 10E6/UL (ref 4.4–5.9)
SODIUM SERPL-SCNC: 137 MMOL/L (ref 135–145)
T4 FREE SERPL-MCNC: 1.15 NG/DL (ref 0.9–1.7)
TRIGL SERPL-MCNC: 372 MG/DL
TSH SERPL DL<=0.005 MIU/L-ACNC: 4.93 UIU/ML (ref 0.3–4.2)
WBC # BLD AUTO: 5.4 10E3/UL (ref 4–11)

## 2025-07-09 PROCEDURE — 85027 COMPLETE CBC AUTOMATED: CPT

## 2025-07-09 PROCEDURE — 80053 COMPREHEN METABOLIC PANEL: CPT

## 2025-07-09 PROCEDURE — 84439 ASSAY OF FREE THYROXINE: CPT

## 2025-07-09 PROCEDURE — G0103 PSA SCREENING: HCPCS

## 2025-07-09 PROCEDURE — 83036 HEMOGLOBIN GLYCOSYLATED A1C: CPT

## 2025-07-09 PROCEDURE — 82043 UR ALBUMIN QUANTITATIVE: CPT

## 2025-07-09 PROCEDURE — 84443 ASSAY THYROID STIM HORMONE: CPT

## 2025-07-09 PROCEDURE — 82570 ASSAY OF URINE CREATININE: CPT

## 2025-07-09 PROCEDURE — 36415 COLL VENOUS BLD VENIPUNCTURE: CPT

## 2025-07-09 PROCEDURE — 80061 LIPID PANEL: CPT

## 2025-07-10 ENCOUNTER — VIRTUAL VISIT (OUTPATIENT)
Dept: UROLOGY | Facility: CLINIC | Age: 65
End: 2025-07-10
Payer: COMMERCIAL

## 2025-07-10 DIAGNOSIS — N40.1 BENIGN PROSTATIC HYPERPLASIA WITH URINARY HESITANCY: ICD-10-CM

## 2025-07-10 DIAGNOSIS — R39.11 BENIGN PROSTATIC HYPERPLASIA WITH URINARY HESITANCY: ICD-10-CM

## 2025-07-10 DIAGNOSIS — R39.198 DECREASED URINE STREAM: ICD-10-CM

## 2025-07-10 DIAGNOSIS — R32 INTERMITTENT URINARY INCONTINENCE: ICD-10-CM

## 2025-07-10 NOTE — NURSING NOTE
Current patient location: [address] [address] [place] [id number]    Is the patient currently in the state of [place]? YES    Visit mode: VIDEO    If the visit is dropped, the patient can be reconnected by:VIDEO VISIT: Text to cell phone:   Telephone Information:   Mobile [phone]       Will anyone else be joining the visit? NO  (If patient encounters technical issues they should call [phone]    Are changes needed to the allergy or medication list? Yes Biotin 600mg PO     Are refills needed on medications prescribed by this physician? NO    Rooming Documentation:  Not applicable    Reason for visit: Consult    Sophia ALLEN

## 2025-07-10 NOTE — PROGRESS NOTES
Urology Consult History and Physical  BRANDY LYMAN   Name: Gael Le    MRN: [id number]   YOB: 1960       We were asked to see Gael Le at the request of Dr. Riley for evaluation and treatment of LUTS.        Chief Complaint:   LUTS    History is obtained from the patient            History of Present Illness:   Gael Le is a 64 year old male who is being seen for evaluation of LUTS    Initially seen 12/6/2018:  Erectile dysfunction   - started in early 2000  - started with Viagra which worked well  - [facility]  - then started on daily cialis - as tried both the 5mg and 7mg from a compounding pharmacy - this had been working well, however notes decreased efficacy over the past several months  - notes strong morning erection most of the time  - reports gets firm enough erection to use for intercourse 30% of the time     Nocturia and LUTS  - notes frequency of urination with at times 4-5x/night  - daytime frequency 1-2 hours - worse with caffeine intake with mild urgency  - slow stream and prolonged voiding - especially at night and at time sensation of incomplete emptying   - no episode of acute urinary retention   - discussed tamsulosin 0.4mg (Flomax) with PCP but did not take due to side effect concern    TODAY 7/10/2025:  In the last year he has had episodes of waking up with some dampness   He was recommended to started tamsulosin 0.4mg (Flomax), but has not yet started due to concern for side effects    He notes that now he is wearing an absorbant pad, it's hard to determine how often this is happening    His flow is intermittent with stopping and starting                Past Medical History:     Past Medical History:   Diagnosis Date    Arthritis     mild in knees and feet    Hypertension     Nontoxic multinodular goiter     Paroxysmal atrial fibrillation (H) 11/04/2021    Supraventricular premature beats             Past Surgical History:     Past  Surgical History:   Procedure Laterality Date    ABDOMEN SURGERY  1968    Hernia    BIOPSY  1993    Bone Cyst - none cancerous    COLONOSCOPY  11/21/2016    4 small polyps, some pockets    COLONOSCOPY WITH CO2 INSUFFLATION N/A 11/21/2016    Procedure: COLONOSCOPY WITH CO2 INSUFFLATION;  Surgeon: Adeel Graf MD;  Location: [facility] OR    COLONOSCOPY WITH CO2 INSUFFLATION N/A 06/13/2022    Procedure: COLONOSCOPY, WITH CO2 INSUFFLATION;  Surgeon: Adeel Graf MD;  Location: [facility] OR    FINGER SURGERY      HERNIA REPAIR      ORTHOPEDIC SURGERY  1993    Bone cyst in Right Ring finger    OTHER SURGICAL HISTORY  03/10/2025    cardiac ablation            Social History:     Social History     Tobacco Use    Smoking status: Never     Passive exposure: Never    Smokeless tobacco: Never   Substance Use Topics    Alcohol use: Yes     Comment: last use 3/9/25; prior use 6-7 6oz glasses of wine       History   Smoking Status    Never   Smokeless Tobacco    Never            Family History:     Family History   Problem Relation Age of Onset    Dementia Mother         multiinfarct dementia    Hypertension Mother     Hyperlipidemia Mother     Cerebrovascular Disease Mother         MID    Depression Mother         Severe    Anxiety Disorder Mother     Diabetes Father         Onset late in life.    Cerebrovascular Disease Father         Left him quadriplegic for 2 yrs    Skin Cancer Father         BCC    Other Cancer Father         minor skin (not treated)    Obesity Father     Myocardial Infarction Paternal Grandmother     Esophageal Cancer Half-Brother     Colon Cancer No family hx of     Prostate Cancer No family hx of     Asthma No family hx of     Liver Disease No family hx of     Liver Cancer No family hx of               Allergies:     Allergies   Allergen Reactions    Lisinopril-Hydrochlorothiazide Cough    Papaya Nausea and Vomiting            Medications:     Current Outpatient Medications   Medication Sig  Dispense Refill    albuterol (PROAIR HFA/PROVENTIL HFA/VENTOLIN HFA) 108 (90 Base) MCG/ACT inhaler Inhale 2 puffs into the lungs every 4 hours as needed. 18 g 1    ALPRAZolam (XANAX) 0.5 MG tablet Take 1 tablet (0.5 mg) by mouth daily as needed for anxiety. +++ appointment needed for additional refills +++ 15 tablet 0    amLODIPine (NORVASC) 5 MG tablet TAKE 1 TABLET BY MOUTH EVERY DAY 90 tablet 0    apixaban ANTICOAGULANT (ELIQUIS) 5 MG tablet Take 5 mg by mouth 2 times daily      atorvastatin (LIPITOR) 40 MG tablet Take 40 mg by mouth at bedtime      flecainide (TAMBOCOR) 100 MG tablet Take 100 mg by mouth every 12 hours.      folic acid (FOLVITE) 1 MG tablet Take 1 tablet (1 mg) by mouth daily. 100 tablet 3    furosemide (LASIX) 20 MG tablet Take 20 mg by mouth.      gabapentin (NEURONTIN) 100 MG capsule Take 1 capsule (100 mg) by mouth 2 times daily. 180 capsule 1    gabapentin (NEURONTIN) 300 MG capsule Take 1 capsule (300 mg) by mouth at bedtime. 90 capsule 1    lidocaine (XYLOCAINE) 2 % external gel Apply a small amount to the affected area up to 4 times daily as needed for pain      losartan-hydrochlorothiazide (HYZAAR) 50-12.5 MG tablet Take 1 tablet by mouth daily      metoprolol succinate ER (TOPROL XL) 25 MG 24 hr tablet Take 1 tablet (25 mg) by mouth daily. 90 tablet 1    Multiple Vitamins-Minerals (SUPER THERA BEATRICE M) TABS Take 1 tablet by mouth daily.      nifedipine 0.2% in white petrolatum 0.2 % OINT ointment Apply topically.      omeprazole (PRILOSEC) 20 MG DR capsule TAKE 1 CAPSULE (20 MG) BY MOUTH DAILY 30-60 MIN PRIOR TO A MEAL 90 capsule 1    Spacer/Aero-Holding Chambers (SPACER/AERO-HOLD CHAMBER MASK) AKIL 1 Device as needed. 1 each 0    tadalafil (CIALIS) 5 MG tablet TAKE 1 TABLET BY MOUTH EVERY DAY 30 tablet 1    tamsulosin (FLOMAX) 0.4 MG capsule TAKE 1 CAPSULE BY MOUTH EVERY DAY 90 capsule 1    terbutaline (BRETHINE) 5 MG tablet At 3 hrs of erection , take 1 tab. At 3 1/2 hrs, take an  additional tab. At 4 hrs go to ER.       No current facility-administered medications for this visit.             Review of Systems:     Reviewed and noted          Physical Exam:     PHYSICAL EXAM  Patient is a 64 year old  male   Vitals: There were no vitals taken for this visit.  There is no height or weight on file to calculate BMI.  General Appearance Adult:   Alert, no acute distress, oriented  Neuro: Alert, oriented, speech and mentation normal  Psych: affect and mood normal             Data:   All laboratory data reviewed:      PSA   Date Value Ref Range Status   10/15/2020 0.66 0 - 4 ug/L Final     Comment:     Assay Method:  Chemiluminescence using Siemens Vista analyzer     Prostate Specific Antigen Screen   Date Value Ref Range Status   07/09/2025 0.64 0.00 - 4.50 ng/mL Final      Lab Results   Component Value Date    CR 0.81 07/09/2025    CR 0.82 05/13/2021      IMAGING:  All pertinent imaging reviewed:    All imaging studies reviewed by me.  I personally reviewed these imaging films.  A formal report from radiology will follow.    U/S ABD AORTA:  IMPRESSION:  1. Ascending aorta measures up to 2.8 cm in diameter.     2. Increased hepatic echogenicity and echotexture. Liver incompletely  evaluated in this study. Differential includes cirrhosis, hepatic  steatosis, and hepatitis. Liver lesions cannot be excluded. If  clinically indicated, further evaluation with abdominal ultrasound or  liver MR could be performed.     3. Bladder outpouchings suggested but incompletely evaluated in this  study. Possible ureteroceles or diverticula. If clinically indicated,  further evaluation with cystoscopy or CT urogram/cystogram.         Impression and Plan:   Impression:   64-year-old man with history of LUTS      Plan:   LUTS  - The pathophysiology of the bladder and the prostate and the normal changes associated with development of BPH and LUTS  - We discussed the mechanism of action of tamsulosin 0.4 mg daily, and  I do recommend he start on this medication.  We discussed potential side effects and reviewed his other medications to ensure no interactions  - Will plan for follow-up in 3 months to assess his symptom response with an AUA symptom score and PVR  - I reviewed his serum creatinine which is stable and normal  - I reviewed a prior abdominal ultrasound which does note some potential bladder diverticulum indicating potential longstanding low degree of bladder outlet obstruction  - This is a chronic problem with progression of symptoms    Screening PSA  - I reviewed his PSA history and trend  - His most recent PSA is very reassuring and stable at 0.64     Thank you for the kind consultation.    Time spent: 20 minutes spent on the date of the encounter doing chart review, history and exam, documentation and further activities as noted above.    Jonatan Steven MD   Urology  [facility]  [facility] Phone: [phone]  [facility] Phone: [phone]     Virtual Visit Details    Type of service:  Video Visit   Video Start Time: 12:31 PM  Video End Time:12:45 PM    Originating Location (pt. Location): Home    Distant Location (provider location):  On-site  Platform used for Video Visit: Wilder

## 2025-07-28 ENCOUNTER — OFFICE VISIT (OUTPATIENT)
Dept: FAMILY MEDICINE | Facility: CLINIC | Age: 65
End: 2025-07-28
Attending: FAMILY MEDICINE
Payer: COMMERCIAL

## 2025-07-28 VITALS
RESPIRATION RATE: 16 BRPM | OXYGEN SATURATION: 95 % | HEART RATE: 101 BPM | HEIGHT: 71 IN | SYSTOLIC BLOOD PRESSURE: 133 MMHG | BODY MASS INDEX: 25.62 KG/M2 | TEMPERATURE: 97.9 F | DIASTOLIC BLOOD PRESSURE: 82 MMHG | WEIGHT: 183 LBS

## 2025-07-28 DIAGNOSIS — J45.30 MILD PERSISTENT ASTHMA WITHOUT COMPLICATION: ICD-10-CM

## 2025-07-28 DIAGNOSIS — I10 BENIGN ESSENTIAL HYPERTENSION: ICD-10-CM

## 2025-07-28 DIAGNOSIS — I48.0 PAROXYSMAL ATRIAL FIBRILLATION (H): ICD-10-CM

## 2025-07-28 DIAGNOSIS — R79.89 ABNORMAL TSH: ICD-10-CM

## 2025-07-28 DIAGNOSIS — Z00.00 ENCOUNTER FOR MEDICARE ANNUAL WELLNESS EXAM: Primary | ICD-10-CM

## 2025-07-28 DIAGNOSIS — F41.1 ANXIETY STATE: ICD-10-CM

## 2025-07-28 PROCEDURE — 3079F DIAST BP 80-89 MM HG: CPT | Performed by: FAMILY MEDICINE

## 2025-07-28 PROCEDURE — 99213 OFFICE O/P EST LOW 20 MIN: CPT | Mod: 25 | Performed by: FAMILY MEDICINE

## 2025-07-28 PROCEDURE — 3075F SYST BP GE 130 - 139MM HG: CPT | Performed by: FAMILY MEDICINE

## 2025-07-28 PROCEDURE — G2211 COMPLEX E/M VISIT ADD ON: HCPCS | Performed by: FAMILY MEDICINE

## 2025-07-28 PROCEDURE — G0439 PPPS, SUBSEQ VISIT: HCPCS | Performed by: FAMILY MEDICINE

## 2025-07-28 PROCEDURE — 1126F AMNT PAIN NOTED NONE PRSNT: CPT | Performed by: FAMILY MEDICINE

## 2025-07-28 RX ORDER — GABAPENTIN 300 MG/1
300 CAPSULE ORAL AT BEDTIME
Qty: 90 CAPSULE | Refills: 1 | Status: SHIPPED | OUTPATIENT
Start: 2025-07-28

## 2025-07-28 RX ORDER — ANTIOX #8/OM3/DHA/EPA/LUT/ZEAX 250-2.5 MG
1 CAPSULE ORAL DAILY
COMMUNITY
Start: 2025-07-28

## 2025-07-28 RX ORDER — FLUTICASONE PROPIONATE AND SALMETEROL 100; 50 UG/1; UG/1
1 POWDER RESPIRATORY (INHALATION) EVERY 12 HOURS
Qty: 60 EACH | Refills: 5 | Status: SHIPPED | OUTPATIENT
Start: 2025-07-28

## 2025-07-28 RX ORDER — GABAPENTIN 100 MG/1
100 CAPSULE ORAL 2 TIMES DAILY
Qty: 180 CAPSULE | Refills: 1 | Status: SHIPPED | OUTPATIENT
Start: 2025-07-28

## 2025-07-28 RX ORDER — AMLODIPINE BESYLATE 5 MG/1
5 TABLET ORAL DAILY
Qty: 90 TABLET | Refills: 0 | Status: SHIPPED | OUTPATIENT
Start: 2025-07-28

## 2025-07-28 RX ORDER — METOPROLOL SUCCINATE 25 MG/1
25 TABLET, EXTENDED RELEASE ORAL 2 TIMES DAILY
COMMUNITY
Start: 2025-07-28

## 2025-07-28 SDOH — HEALTH STABILITY: PHYSICAL HEALTH: ON AVERAGE, HOW MANY DAYS PER WEEK DO YOU ENGAGE IN MODERATE TO STRENUOUS EXERCISE (LIKE A BRISK WALK)?: 2 DAYS

## 2025-07-28 SDOH — HEALTH STABILITY: PHYSICAL HEALTH: ON AVERAGE, HOW MANY MINUTES DO YOU ENGAGE IN EXERCISE AT THIS LEVEL?: 50 MIN

## 2025-07-28 ASSESSMENT — ASTHMA QUESTIONNAIRES
ACT_TOTALSCORE: 19
QUESTION_5 LAST FOUR WEEKS HOW WOULD YOU RATE YOUR ASTHMA CONTROL: WELL CONTROLLED
QUESTION_4 LAST FOUR WEEKS HOW OFTEN HAVE YOU USED YOUR RESCUE INHALER OR NEBULIZER MEDICATION (SUCH AS ALBUTEROL): TWO OR THREE TIMES PER WEEK
QUESTION_1 LAST FOUR WEEKS HOW MUCH OF THE TIME DID YOUR ASTHMA KEEP YOU FROM GETTING AS MUCH DONE AT WORK, SCHOOL OR AT HOME: A LITTLE OF THE TIME
QUESTION_3 LAST FOUR WEEKS HOW OFTEN DID YOUR ASTHMA SYMPTOMS (WHEEZING, COUGHING, SHORTNESS OF BREATH, CHEST TIGHTNESS OR PAIN) WAKE YOU UP AT NIGHT OR EARLIER THAN USUAL IN THE MORNING: ONCE A WEEK
QUESTION_2 LAST FOUR WEEKS HOW OFTEN HAVE YOU HAD SHORTNESS OF BREATH: NOT AT ALL

## 2025-07-28 ASSESSMENT — PAIN SCALES - GENERAL: PAINLEVEL_OUTOF10: NO PAIN (0)

## 2025-07-28 ASSESSMENT — SOCIAL DETERMINANTS OF HEALTH (SDOH): HOW OFTEN DO YOU GET TOGETHER WITH FRIENDS OR RELATIVES?: TWICE A WEEK

## 2025-07-28 NOTE — PATIENT INSTRUCTIONS
Refills updated today.    RSV vaccine recommended.  You can get this at the pharmacy.    Stop the biotin for 1 week prior to the thyroid testing repeat.  Order is available for this testing.          Patient Education   Preventive Care Advice   This is general advice given by our system to help you stay healthy. However, your care team may have specific advice just for you. Please talk to your care team about your preventive care needs.  Nutrition  Eat 5 or more servings of fruits and vegetables each day.  Try wheat bread, brown rice and whole grain pasta (instead of white bread, rice, and pasta).  Get enough calcium and vitamin D. Check the label on foods and aim for 100% of the RDA (recommended daily allowance).  Lifestyle  Exercise at least 150 minutes each week  (30 minutes a day, 5 days a week).  Do muscle strengthening activities 2 days a week. These help control your weight and prevent disease.  No smoking.  Wear sunscreen to prevent skin cancer.  Have a dental exam and cleaning every 6 months.  Yearly exams  See your health care team every year to talk about:  Any changes in your health.  Any medicines your care team has prescribed.  Preventive care, family planning, and ways to prevent chronic diseases.  Shots (vaccines)   HPV shots (up to age 26), if you've never had them before.  Hepatitis B shots (up to age 59), if you've never had them before.  COVID-19 shot: Get this shot when it's due.  Flu shot: Get a flu shot every year.  Tetanus shot: Get a tetanus shot every 10 years.  Pneumococcal, hepatitis A, and RSV shots: Ask your care team if you need these based on your risk.  Shingles shot (for age 50 and up)  General health tests  Diabetes screening:  Starting at age 35, Get screened for diabetes at least every 3 years.  If you are younger than age 35, ask your care team if you should be screened for diabetes.  Cholesterol test: At age 39, start having a cholesterol test every 5 years, or more often if  advised.  Bone density scan (DEXA): At age 50, ask your care team if you should have this scan for osteoporosis (brittle bones).  Hepatitis C: Get tested at least once in your life.  STIs (sexually transmitted infections)  Before age 24: Ask your care team if you should be screened for STIs.  After age 24: Get screened for STIs if you're at risk. You are at risk for STIs (including HIV) if:  You are sexually active with more than one person.  You don't use condoms every time.  You or a partner was diagnosed with a sexually transmitted infection.  If you are at risk for HIV, ask about PrEP medicine to prevent HIV.  Get tested for HIV at least once in your life, whether you are at risk for HIV or not.  Cancer screening tests  Cervical cancer screening: If you have a cervix, begin getting regular cervical cancer screening tests starting at age 21.  Breast cancer scan (mammogram): If you've ever had breasts, begin having regular mammograms starting at age 40. This is a scan to check for breast cancer.  Colon cancer screening: It is important to start screening for colon cancer at age 45.  Have a colonoscopy test every 10 years (or more often if you're at risk) Or, ask your provider about stool tests like a FIT test every year or Cologuard test every 3 years.  To learn more about your testing options, visit:   .  For help making a decision, visit:   https://bit.ly/wm25758.  Prostate cancer screening test: If you have a prostate, ask your care team if a prostate cancer screening test (PSA) at age 55 is right for you.  Lung cancer screening: If you are a current or former smoker ages 50 to 80, ask your care team if ongoing lung cancer screenings are right for you.  For informational purposes only. Not to replace the advice of your health care provider. Copyright   2023 [employer]. All rights reserved. Clinically reviewed by the [facility]itions [facility]. VENNCOMM [id number] - REV 01/24.  Learning  About Activities of Daily Living  What are activities of daily living?     Activities of daily living (ADLs) are the basic self-care tasks you do every day. These include eating, bathing, dressing, and moving around.  As you age, and if you have health problems, you may find that it's harder to do some of these tasks. If so, your doctor can suggest ideas that may help.  To measure what kind of help you may need, your doctor will ask how well you are able to do ADLs. Let your doctor know if there are any tasks that you are having trouble doing. This is an important first step to getting help. And when you have the help you need, you can stay as independent as possible.  How will a doctor assess your ADLs?  Asking about ADLs is part of a routine health checkup your doctor will likely do as you age. Your health check might be done in a doctor's office, in your home, or at a hospital. The goal is to find out if you are having any problems that could make it hard to care for yourself or that make it unsafe for you to be on your own.  To measure your ADLs, your doctor will ask how hard it is for you to do routine tasks. Your doctor may also want to know if you have changed the way you do a task because of a health problem. Your doctor may watch how you:  Walk back and forth.  Keep your balance while you stand or walk.  Move from sitting to standing or from a bed to a chair.  Button or unbutton a shirt or sweater.  Remove and put on your shoes.  It's common to feel a little worried or anxious if you find you can't do all the things you used to be able to do. Talking with your doctor about ADLs is a way to make sure you're as safe as possible and able to care for yourself as well as you can. You may want to bring a caregiver, friend, or family member to your checkup. They can help you talk to your doctor.  Follow-up care is a key part of your treatment and safety. Be sure to make and go to all appointments, and call your  doctor if you are having problems. It's also a good idea to know your test results and keep a list of the medicines you take.  Current as of: October 24, 2024  Content Version: 14.5    4350-8947 [employer].   Care instructions adapted under license by your healthcare professional. If you have questions about a medical condition or this instruction, always ask your healthcare professional. [employer] disclaims any warranty or liability for your use of this information.    Hearing Loss: Care Instructions  Overview     Hearing loss is a sudden or slow decrease in how well you hear. It can range from slight to profound. Permanent hearing loss can occur with aging. It also can happen when you are exposed long-term to loud noise. Examples include listening to loud music, riding motorcycles, or being around other loud machines.  Hearing loss can affect your work and home life. It can make you feel lonely or depressed. You may feel that you have lost your independence. But hearing aids and other devices can help you hear better and feel connected to others.  Follow-up care is a key part of your treatment and safety. Be sure to make and go to all appointments, and call your doctor if you are having problems. It's also a good idea to know your test results and keep a list of the medicines you take.  How can you care for yourself at home?  Avoid loud noises whenever possible. This helps keep your hearing from getting worse.  Always wear hearing protection around loud noises.  Wear a hearing aid as directed.  A professional can help you pick a hearing aid that will work best for you.  You can also get hearing aids over the counter for mild to moderate hearing loss.  Have hearing tests as your doctor suggests. They can show whether your hearing has changed. Your hearing aid may need to be adjusted.  Use other devices as needed. These may include:  Telephone amplifiers and hearing aids that can connect to  "a television, stereo, radio, or microphone.  Devices that use lights or vibrations. These alert you to the doorbell, a ringing telephone, or a baby monitor.  Television closed-captioning. This shows the words at the bottom of the screen. Most new TVs can do this.  TTY (text telephone). This lets you type messages back and forth on the telephone instead of talking or listening. These devices are also called TDD. When messages are typed on the keyboard, they are sent over the phone line to a receiving TTY. The message is shown on a monitor.  Use text messaging, social media, and email if it is hard for you to communicate by telephone.  Try to learn a listening technique called speechreading. It is not lipreading. You pay attention to people's gestures, expressions, posture, and tone of voice. These clues can help you understand what a person is saying. Face the person you are talking to, and have them face you. Make sure the lighting is good. You need to see the other person's face clearly.  Think about counseling if you need help to adjust to your hearing loss.  When should you call for help?  Watch closely for changes in your health, and be sure to contact your doctor if:    You think your hearing is getting worse.     You have new symptoms, such as dizziness or nausea.   Where can you learn more?  Go to https://www.[employer].net/patiented  Enter R798 in the search box to learn more about \"Hearing Loss: Care Instructions.\"  Current as of: October 27, 2024  Content Version: 14.5 2024-2025 [employer].   Care instructions adapted under license by your healthcare professional. If you have questions about a medical condition or this instruction, always ask your healthcare professional. [employer] disclaims any warranty or liability for your use of this information.    Learning About Stress  What is stress?     Stress is your body's response to a hard situation. Your body can have a physical, " emotional, or mental response. Stress is a fact of life for most people, and it affects everyone differently. What causes stress for you may not be stressful for someone else.  A lot of things can cause stress. You may feel stress when you go on a job interview, take a test, or run a race. This kind of short-term stress is normal and even useful. It can help you if you need to work hard or react quickly. For example, stress can help you finish an important job on time.  Long-term stress is caused by ongoing stressful situations or events. Examples of long-term stress include long-term health problems, ongoing problems at work, or conflicts in your family. Long-term stress can harm your health.  How does stress affect your health?  When you are stressed, your body responds as though you are in danger. It makes hormones that speed up your heart, make you breathe faster, and give you a burst of energy. This is called the fight-or-flight stress response. If the stress is over quickly, your body goes back to normal and no harm is done.  But if stress happens too often or lasts too long, it can have bad effects. Long-term stress can make you more likely to get sick, and it can make symptoms of some diseases worse. If you tense up when you are stressed, you may develop neck, shoulder, or low back pain. Stress is linked to high blood pressure and heart disease.  Stress also harms your emotional health. It can make you mcfadden, tense, or depressed. Your relationships may suffer, and you may not do well at work or school.  What can you do to manage stress?  You can try these things to help manage stress:   Do something active. Exercise or activity can help reduce stress. Walking is a great way to get started. Even everyday activities such as housecleaning or yard work can help.  Try yoga or ernestina chi. These techniques combine exercise and meditation. You may need some training at first to learn them.  Do something you enjoy. For  "example, listen to music or go to a movie. Practice your hobby or do volunteer work.  Meditate. This can help you relax, because you are not worrying about what happened before or what may happen in the future.  Do guided imagery. Imagine yourself in any setting that helps you feel calm. You can use online videos, books, or a teacher to guide you.  Do breathing exercises. For example:  From a standing position, bend forward from the waist with your knees slightly bent. Let your arms dangle close to the floor.  Breathe in slowly and deeply as you return to a standing position. Roll up slowly and lift your head last.  Hold your breath for just a few seconds in the standing position.  Breathe out slowly and bend forward from the waist.  Let your feelings out. Talk, laugh, cry, and express anger when you need to. Talking with supportive friends or family, a counselor, or a major leader about your feelings is a healthy way to relieve stress. Avoid discussing your feelings with people who make you feel worse.  Write. It may help to write about things that are bothering you. This helps you find out how much stress you feel and what is causing it. When you know this, you can find better ways to cope.  What can you do to prevent stress?  You might try some of these things to help prevent stress:  Manage your time. This helps you find time to do the things you want and need to do.  Get enough sleep. Your body recovers from the stresses of the day while you are sleeping.  Get support. Your family, friends, and community can make a difference in how you experience stress.  Limit your news feed. Avoid or limit time on social media or news that may make you feel stressed.  Do something active. Exercise or activity can help reduce stress. Walking is a great way to get started.  Where can you learn more?  Go to https://www.healthwise.net/patiented  Enter N032 in the search box to learn more about \"Learning About Stress.\"  Current " as of: October 24, 2024  Content Version: 14.5    6510-5328 [employer].   Care instructions adapted under license by your healthcare professional. If you have questions about a medical condition or this instruction, always ask your healthcare professional. [employer] disclaims any warranty or liability for your use of this information.    Bladder Training: Care Instructions  Your Care Instructions     Bladder training is used to treat urge incontinence and stress incontinence. Urge incontinence means that the need to urinate comes on so fast that you can't get to a toilet in time. Stress incontinence means that you leak urine because of pressure on your bladder. For example, it may happen when you laugh, cough, or lift something heavy.  Bladder training can increase how long you can wait before you have to urinate. It can also help your bladder hold more urine. And it can give you better control over the urge to urinate.  It is important to remember that bladder training takes a few weeks to a few months to make a difference. You may not see results right away, but don't give up.  Follow-up care is a key part of your treatment and safety. Be sure to make and go to all appointments, and call your doctor if you are having problems. It's also a good idea to know your test results and keep a list of the medicines you take.  How can you care for yourself at home?  Work with your doctor to come up with a bladder training program that is right for you. You may use one or more of the following methods.  Delayed urination  In the beginning, try to keep from urinating for 5 minutes after you first feel the need to go.  While you wait, take deep, slow breaths to relax. Kegel exercises can also help you delay the need to go to the bathroom.  After some practice, when you can easily wait 5 minutes to urinate, try to wait 10 minutes before you urinate.  Slowly increase the waiting period until you are able  "to control when you have to urinate.  Scheduled urination  Empty your bladder when you first wake up in the morning.  Schedule times throughout the day when you will urinate.  Start by going to the bathroom every hour, even if you don't need to go.  Slowly increase the time between trips to the bathroom.  When you have found a schedule that works well for you, keep doing it.  If you wake up during the night and have to urinate, do it. Apply your schedule to waking hours only.  Kegel exercises  These tighten and strengthen pelvic muscles, which can help you control the flow of urine. (If doing these exercises causes pain, stop doing them and talk with your doctor.) To do Kegel exercises:  Squeeze your muscles as if you were trying not to pass gas. Or squeeze your muscles as if you were stopping the flow of urine. Your belly, legs, and buttocks shouldn't move.  Hold the squeeze for 3 seconds, then relax for 5 to 10 seconds.  Start with 3 seconds, then add 1 second each week until you are able to squeeze for 10 seconds.  Repeat the exercise 10 times a session. Do 3 to 8 sessions a day.  When should you call for help?  Watch closely for changes in your health, and be sure to contact your doctor if:    Your incontinence is getting worse.     You do not get better as expected.   Where can you learn more?  Go to https://www.[employer].net/patiented  Enter V684 in the search box to learn more about \"Bladder Training: Care Instructions.\"  Current as of: April 30, 2024  Content Version: 14.5    3846-1998 [email or web address].   Care instructions adapted under license by your healthcare professional. If you have questions about a medical condition or this instruction, always ask your healthcare professional. [email or web address] disclaims any warranty or liability for your use of this information.    9 Ways to Cut Back on Drinking  Maybe you've found yourself drinking more alcohol than you'd prefer. If you want to cut " "back, here are some ideas to try.    Think before you drink.  Do you really want a drink, or is it just a habit? If you're used to having a drink at a certain time, try doing something else then.     Look for substitutes.  Find some no-alcohol drinks that you enjoy, like flavored seltzer water, tea with honey, or tonic with a slice of lime. Or try alcohol-free beer or \"virgin\" cocktails (without the alcohol).     Drink more water.  Use water to quench your thirst. Drink a glass of water before you have any alcohol. Have another glass along with every drink or between drinks.     Shrink your drink.  For example, have a bottle of beer instead of a pint. Use a smaller glass for wine. Choose drinks with lower alcohol content (ABV%). Or use less liquor and more mixer in cocktails.     Slow down.  It's easy to drink quickly and without thinking about it. Pay attention, and make each drink last longer.     Do the math.  Total up how much you spend on alcohol each month. How much is that a year? If you cut back, what could you do with the money you save?     Take a break.  Choose a day or two each week when you won't drink at all. Notice how you feel on those days, physically and emotionally. How did you sleep? Do you feel better? Over time, add more break days.     Count calories.  Would you like to lose some weight? For some people that's a good motivator for cutting back. Figure out how many calories are in each drink. How many does that add up to in a day? In a week? In a month?     Practice saying no.  Be ready when someone offers you a drink. Try: \"Thanks, I've had enough.\" Or \"Thanks, but I'm cutting back.\" Or \"No, thanks. I feel better when I drink less.\"   Current as of: August 20, 2024  Content Version: 14.5 2024-2025 [employer].   Care instructions adapted under license by your healthcare professional. If you have questions about a medical condition or this instruction, always ask your healthcare " professional. [facility] disclaims any warranty or liability for your use of this information.

## 2025-07-28 NOTE — PROGRESS NOTES
Preventive Care Visit  [facility]  Idania Marroquin MD, Family Medicine  Jul 28, 2025      Assessment & Plan     Encounter for Medicare annual wellness exam  Screening and preventative care discussed.      Mild persistent asthma without complication  Symptoms incompletely controlled with persistent asthma symptoms.  Begin trial of LABA/ICS for preventative use. Prn albuterol to continue.    - fluticasone-salmeterol (ADVAIR) 100-50 MCG/ACT inhaler; Inhale 1 puff into the lungs every 12 hours.    Anxiety state  Gabapentin as noted. Has used selective serotonin reuptake inhibitor in past with side effects.  Continue current treatment.   - gabapentin (NEURONTIN) 300 MG capsule; Take 1 capsule (300 mg) by mouth at bedtime.  - gabapentin (NEURONTIN) 100 MG capsule; Take 1 capsule (100 mg) by mouth 2 times daily.    Paroxysmal atrial fibrillation (H)  S/p Ablation.  Ongoing care with cardiology.  Anticoag to continue.  Off antiarrythmic.   - metoprolol succinate ER (TOPROL XL) 25 MG 24 hr tablet; Take 1 tablet (25 mg) by mouth 2 times daily.    Benign essential hypertension  BP controlled.    - metoprolol succinate ER (TOPROL XL) 25 MG 24 hr tablet; Take 1 tablet (25 mg) by mouth 2 times daily.  - amLODIPine (NORVASC) 5 MG tablet; Take 1 tablet (5 mg) by mouth daily.    Abnormal TSH  Does need repeat TSH done in the coming weeks.  We did discuss the fact that the biotin he is taking for his nails may skew the thyroid results.  He is mady go off the biotin for at least a week prior to the lab being completed.      Patient has been advised of split billing requirements and indicates understanding: Yes      Patient Instructions   Refills updated today.    RSV vaccine recommended.  You can get this at the pharmacy.    Stop the biotin for 1 week prior to the thyroid testing repeat.  Order is available for this testing.       The longitudinal plan of care for the diagnosis(es)/condition(s) as documented  were addressed during this visit. Due to the added complexity in care, I will continue to support Gael in the subsequent management and with ongoing continuity of care.        Counseling  Appropriate preventive services were addressed with this patient via screening, questionnaire, or discussion as appropriate for fall prevention, nutrition, physical activity, Tobacco-use cessation, social engagement, weight loss and cognition.  Checklist reviewing preventive services available has been given to the patient.  Reviewed patient's diet, addressing concerns and/or questions.   He is at risk for lack of exercise and has been provided with information to increase physical activity for the benefit of his well-being.   He is at risk for psychosocial distress and has been provided with information to reduce risk.   The patient reports drinking more than 3 alcoholic drinks per day and/or more than 7 drhnks per week. The patient was counseled and given information about possible harmful effects of excessive alcohol intake.Patient reported safety concerns were addressed today.The patient was provided with written information regarding signs of hearing loss.   Information on urinary incontinence and treatment options given to patient.   Reviewed preventive health counseling, as reflected in patient instructions       Regular exercise       Healthy diet/nutrition       Vision screening       Immunizations  No vaccines given today.  RSV at the pharmacy recommended           Colorectal cancer screening       Consider prostate cancer screening (age 55-69)       Osteoporosis prevention/bone health       Advance Care Planning    Follow-up    Follow-up Visit   Expected date:  Aug 04, 2026 (Approximate)      Follow Up Appointment Details:     Follow-up with whom?: PCP    Follow-Up for what?: Medicare Wellness    Welcome or Annual?: Annual Wellness    How?: In Person                 Mirian Mayo is a 64 year old, presenting for  the following:  Physical        7/28/2025     7:15 AM   Additional Questions   Roomed by Stephaniegonzalo   Accompanied by self         7/28/2025     7:15 AM   Patient Reported Additional Medications   Patient reports taking the following new medications no          HPI       Asthma      7/28/2025     7:15 AM   ACT Total Scores   ACT TOTAL SCORE (Goal Greater than or Equal to 20) 19    In the past 12 months, how many times did you visit the emergency room for your asthma without being admitted to the hospital? 0   In the past 12 months, how many times were you hospitalized overnight because of your asthma? 0       Patient-reported   Using albuterol 1-2/week -at night awakening.  1-2 PM without activity.    Do you have any of the following symptoms? Cough  What makes your asthma/breathing worse?  Upper respiratory illness and Unknown  Do you want more information about how to use your inhaler? No      Hypertension Follow-up    Do you check your blood pressure regularly outside of the clinic? Yes   Are you following a low salt diet? Yes  Are your blood pressures ever more than 140 on the top number (systolic) OR more   than 90 on the bottom number (diastolic), for example 140/90? No    BP Readings from Last 2 Encounters:   07/28/25 133/82   03/27/25 (!) 159/80     Atrial Fibrillation Follow-up  Ablation and no symptoms of Afib since that time.  Off flecainide.  On higher dose metoprolol - twice a day dosing to control HR and BP   Symptoms: no recent chest pain, significant palpitations, dizziness/lightheadedness, dyspnea, or increased peripheral edema.  Stroke prevention: DOAC (Eliquis, Xarelto, Pradaxa)        8/22/2022     4:27 PM 8/17/2023     7:05 AM 8/17/2023     7:09 AM 3/27/2025     3:42 PM 7/28/2025     7:16 AM   Date   Pulse 79 63 74 66 101     Current [id number] Score: 2 points, which represents a 2.2% annual risk of major embolic event, without anti-coagulation or an LAAO device.       Anxiety   How are you doing  with your anxiety since your last visit? Worsened - stress - political, family health problems  Are you having other symptoms that might be associated with anxiety? No  Have you had a significant life event? Grief or Loss, Health Concerns, and OTHER: political environment.   Are you feeling depressed? No  Do you have any concerns with your use of alcohol or other drugs? Yes:  regular use of alcohol again to relax in evening - 2-3 day per week    Social History     Tobacco Use    Smoking status: Never     Passive exposure: Never    Smokeless tobacco: Never   Vaping Use    Vaping status: Never Used   Substance Use Topics    Alcohol use: Yes     Comment: last use 3/9/25; prior use 6-7 6oz glasses of wine    Drug use: Not Currently     Comment: no hx IVDU         8/17/2023     8:10 AM 12/12/2023    12:18 PM 1/22/2024     8:24 AM   MIRIAN-7 SCORE   Total Score  15 (severe anxiety) 3 (minimal anxiety)   Total Score 14 15    15 3         5/26/2022     3:32 PM 6/28/2022     2:16 PM 8/17/2023     8:10 AM   PHQ   PHQ-9 Total Score 2 0 2   Q9: Thoughts of better off dead/self-harm past 2 weeks Not at all Not at all Not at all       Advance Care Planning    Document on file is a Health Care Directive or POLST.        7/28/2025   General Health   How would you rate your overall physical health? Good   Feel stress (tense, anxious, or unable to sleep) Very much   (!) STRESS CONCERN      7/28/2025   Nutrition   Diet: Regular (no restrictions)         7/28/2025   Exercise   Days per week of moderate/strenous exercise 2 days   Average minutes spent exercising at this level 50 min   (!) EXERCISE CONCERN - walking, going back to gym soon      7/28/2025   Social Factors   Frequency of gathering with friends or relatives Twice a week   Worry food won't last until get money to buy more No   Food not last or not have enough money for food? No   Do you have housing? (Housing is defined as stable permanent housing and does not include staying  outside in a car, in a tent, in an abandoned building, in an overnight shelter, or couch-surfing.) Yes   Are you worried about losing your housing? No   Lack of transportation? No   Unable to get utilities (heat,electricity)? No         7/28/2025   Fall Risk   Fallen 2 or more times in the past year? No   Trouble with walking or balance? No          7/28/2025   Activities of Daily Living- Home Safety   Needs help with the following daily activites None of the above   Safety concerns in the home No grab bars in the bathroom         7/28/2025   Dental   Dentist two times every year? Yes         7/28/2025   Hearing Screening   Hearing concerns? (!) I NEED TO ASK PEOPLE TO SPEAK UP OR REPEAT THEMSELVES.    (!) IT'S HARD TO FOLLOW A CONVERSATION IN A NOISY RESTAURANT OR CROWDED ROOM.    (!) TROUBLE UNDESTANDING A SPEAKER IN A PUBLIC MEETING OR [employer] SERVICE.    (!) TROUBLE UNDERSTANDING SOFT OR WHISPERED SPEECH.   Would you like a referral for hearing testing? I already have hearing aids       Multiple values from one day are sorted in reverse-chronological order         7/28/2025   Driving Risk Screening   Patient/family members have concerns about driving No         7/28/2025   General Alertness/Fatigue Screening   Have you been more tired than usual lately? No         7/28/2025   Urinary Incontinence Screening   Bothered by leaking urine in past 6 months Yes         Today's PHQ-2 Score:       7/28/2025     7:13 AM   PHQ-2 ( 1999 [employer])   Q1: Little interest or pleasure in doing things 0   Q2: Feeling down, depressed or hopeless 0   PHQ-2 Score 0    Q1: Little interest or pleasure in doing things Not at all   Q2: Feeling down, depressed or hopeless Not at all   PHQ-2 Score 0       Patient-reported           7/28/2025   Substance Use   Alcohol more than 3/day or more than 7/wk Yes   How often do you have a drink containing alcohol 4 or more times a week   How many alcohol drinks on typical day 3 or 4   How often do  you have 5+ drinks at one occasion Never   Audit 2/3 Score 1   How often not able to stop drinking once started Never   How often failed to do what normally expected Never   How often needed first drink in am after a heavy drinking session Never   How often feeling of guilt or remorse after drinking Never   How often unable to remember what happened the night before Never   Have you or someone else been injured because of your drinking No   Has anyone been concerned or suggested you cut down on drinking Yes, during the last year   TOTAL SCORE - AUDIT 9   Do you have a current opioid prescription? No   How severe/bad is pain from 1 to 10? 0/10 (No Pain)   Do you use any other substances recreationally? No     Social History     Tobacco Use    Smoking status: Never     Passive exposure: Never    Smokeless tobacco: Never   Vaping Use    Vaping status: Never Used   Substance Use Topics    Alcohol use: Yes     Comment: last use 3/9/25; prior use 6-7 6oz glasses of wine    Drug use: Not Currently     Comment: no hx IVDU           7/28/2025   AAA Screening   Family history of Abdominal Aortic Aneurysm (AAA)? No   Last PSA:   PSA   Date Value Ref Range Status   10/15/2020 0.66 0 - 4 ug/L Final     Comment:     Assay Method:  Chemiluminescence using Siemens Vista analyzer     Prostate Specific Antigen Screen   Date Value Ref Range Status   07/09/2025 0.64 0.00 - 4.50 ng/mL Final     ASCVD Risk   The 10-year ASCVD risk score (Scarlett BLOCK, et al., 2019) is: 16.3%    Values used to calculate the score:      Age: 64 years      Sex: Male      Is Non- : No      Diabetic: No      Tobacco smoker: No      Systolic Blood Pressure: 133 mmHg      Is BP treated: Yes      HDL Cholesterol: 60 mg/dL      Total Cholesterol: 220 mg/dL          Reviewed and updated as needed this visit by Provider   Tobacco  Allergies  Meds   Med Hx  Surg Hx  Fam Hx            Past Medical History:   Diagnosis Date     Arthritis     mild in knees and feet    Hypertension     Nontoxic multinodular goiter     Paroxysmal atrial fibrillation (H) 11/04/2021    Supraventricular premature beats      Past Surgical History:   Procedure Laterality Date    ABDOMEN SURGERY  1968    Hernia    BIOPSY  1993    Bone Cyst - none cancerous    COLONOSCOPY  11/21/2016    4 small polyps, some pockets    COLONOSCOPY WITH CO2 INSUFFLATION N/A 11/21/2016    Procedure: COLONOSCOPY WITH CO2 INSUFFLATION;  Surgeon: Adeel Graf MD;  Location: [facility] OR    COLONOSCOPY WITH CO2 INSUFFLATION N/A 06/13/2022    Procedure: COLONOSCOPY, WITH CO2 INSUFFLATION;  Surgeon: Adeel Graf MD;  Location: [facility] OR    FINGER SURGERY      HERNIA REPAIR      ORTHOPEDIC SURGERY  1993    Bone cyst in Right Ring finger    OTHER SURGICAL HISTORY  03/10/2025    cardiac ablation     BP Readings from Last 3 Encounters:   07/28/25 133/82   03/27/25 (!) 159/80   08/17/23 (!) 155/90    Wt Readings from Last 3 Encounters:   07/28/25 83 kg (183 lb)   03/27/25 84.3 kg (185 lb 12.8 oz)   08/17/23 81.6 kg (180 lb)                  Current providers sharing in care for this patient include:  Patient Care Team:  Idania Marroquin MD as PCP - General (Family Practice)  Idania Marroquin MD as Assigned PCP  Ani Enamorado PA-C as Physician Assistant  Ani Enamorado PA-C as Assigned Gastroenterology Provider  Harish Sigala MD as Assigned Musculoskeletal Provider  Jonatan Steven MD as MD (Urology)  Jonatan Steven MD as Assigned Surgical Provider    The following health maintenance items are reviewed in Epic and correct as of today:  Health Maintenance   Topic Date Due    ASTHMA ACTION PLAN  Never done    RSV VACCINE (1 - Risk 60-74 years 1-dose series) Never done    MEDICARE ANNUAL WELLNESS VISIT  08/17/2024    ANNUAL REVIEW OF HM ORDERS  01/22/2026    INFLUENZA VACCINE (1) 09/01/2025    ASTHMA CONTROL TEST  01/28/2026    BMP  07/09/2026    LIPID   "07/09/2026    FALL RISK ASSESSMENT  07/28/2026    DTAP/TDAP/TD VACCINE (3 - Td or Tdap) 06/06/2027    DIABETES SCREENING  07/09/2028    ADVANCE CARE PLANNING  08/20/2028    COLORECTAL CANCER SCREENING  06/13/2029    HEPATITIS C SCREENING  Completed    PHQ-2 (once per calendar year)  Completed    PNEUMOCOCCAL VACCINE 50+ YEARS  Completed    HPV VACCINE (No Doses Required) Completed    ZOSTER VACCINE  Completed    COVID-19 VACCINE  Completed    MENINGITIS VACCINE  Aged Out         Review of Systems  Constitutional, HEENT, cardiovascular, pulmonary, GI, , musculoskeletal, neuro, skin, endocrine and psych systems are negative, except as otherwise noted.     Objective    Exam  /82 (BP Location: Right arm, Patient Position: Sitting, Cuff Size: Adult Regular)   Pulse 101   Temp 97.9  F (36.6  C) (Temporal)   Resp 16   Ht 1.791 m (5' 10.5\")   Wt 83 kg (183 lb)   SpO2 95%   BMI 25.89 kg/m     Estimated body mass index is 25.89 kg/m  as calculated from the following:    Height as of this encounter: 1.791 m (5' 10.5\").    Weight as of this encounter: 83 kg (183 lb).    Physical Exam  GENERAL: alert and no distress  EYES: Eyes grossly normal to inspection, PERRL and conjunctivae and sclerae normal  HENT: ear canals and TM's normal, nose and mouth without ulcers or lesions  NECK: no adenopathy, no asymmetry, masses, or scars  RESP: lungs clear to auscultation - no rales, rhonchi or wheezes  CV: regular rate and rhythm, normal S1 S2, no S3 or S4, no murmur, click or rub, no peripheral edema  ABDOMEN: soft, nontender, no hepatosplenomegaly, no masses and bowel sounds normal  MS: no gross musculoskeletal defects noted, no edema  SKIN: no suspicious lesions or rashes  NEURO: Normal strength and tone, mentation intact and speech normal  PSYCH: mentation appears normal, affect normal/bright        7/28/2025   Mini Cog   Clock Draw Score 2 Normal   3 Item Recall 3 objects recalled   Mini Cog Total Score 5          "     Signed Electronically by: Idania Marroquin MD      This chart was documented by provider using a voice activated software called Dragon in addition to manual typing. There may be vocabulary errors or other grammatical errors due to this.

## 2025-09-03 ENCOUNTER — VIRTUAL VISIT (OUTPATIENT)
Dept: URGENT CARE | Facility: CLINIC | Age: 65
End: 2025-09-03
Payer: COMMERCIAL

## 2025-09-03 DIAGNOSIS — F10.90 ALCOHOL USE DISORDER: ICD-10-CM

## 2025-09-03 DIAGNOSIS — R11.0 NAUSEA: ICD-10-CM

## 2025-09-03 DIAGNOSIS — R63.0 LACK OF APPETITE: Primary | ICD-10-CM

## 2025-09-03 DIAGNOSIS — R10.817 GENERALIZED ABDOMINAL TENDERNESS WITHOUT REBOUND TENDERNESS: ICD-10-CM

## 2025-09-03 DIAGNOSIS — R19.7 DIARRHEA, UNSPECIFIED TYPE: ICD-10-CM

## 2025-09-03 PROCEDURE — 98005 SYNCH AUDIO-VIDEO EST LOW 20: CPT

## 2025-09-04 ENCOUNTER — TELEPHONE (OUTPATIENT)
Dept: FAMILY MEDICINE | Facility: CLINIC | Age: 65
End: 2025-09-04
Payer: COMMERCIAL

## 2025-09-04 LAB
C CAYETANENSIS DNA STL QL NAA+NON-PROBE: NEGATIVE
CAMPYLOBACTER DNA SPEC NAA+PROBE: NEGATIVE
CRYPTOSP DNA STL QL NAA+NON-PROBE: NEGATIVE
EC STX1+STX2 GENES STL QL NAA+NON-PROBE: NEGATIVE
G LAMBLIA DNA STL QL NAA+NON-PROBE: NEGATIVE
NOROVIRUS GI+II RNA STL QL NAA+NON-PROBE: POSITIVE
SALMONELLA SP RPOD STL QL NAA+PROBE: NEGATIVE
SHIGELLA SP+EIEC IPAH ST NAA+NON-PROBE: NEGATIVE
VIBRIO DNA SPEC NAA+PROBE: NEGATIVE
Y ENTEROCOL DNA STL QL NAA+PROBE: NEGATIVE

## (undated) DEVICE — SOL WATER IRRIG 1000ML BOTTLE 07139-09

## (undated) RX ORDER — FENTANYL CITRATE 50 UG/ML
INJECTION, SOLUTION INTRAMUSCULAR; INTRAVENOUS
Status: DISPENSED
Start: 2022-06-13